# Patient Record
Sex: MALE | Race: WHITE | Employment: PART TIME | URBAN - METROPOLITAN AREA
[De-identification: names, ages, dates, MRNs, and addresses within clinical notes are randomized per-mention and may not be internally consistent; named-entity substitution may affect disease eponyms.]

---

## 2023-08-17 ENCOUNTER — HOSPITAL ENCOUNTER (OUTPATIENT)
Dept: DATA CONVERSION | Facility: HOSPITAL | Age: 68
Discharge: HOME | End: 2023-08-17
Payer: MEDICARE

## 2023-08-17 DIAGNOSIS — Z18.10 RETAINED METAL FRAGMENTS, UNSPECIFIED: ICD-10-CM

## 2023-08-17 DIAGNOSIS — M96.1 POSTLAMINECTOMY SYNDROME, NOT ELSEWHERE CLASSIFIED: ICD-10-CM

## 2023-08-17 DIAGNOSIS — M54.16 RADICULOPATHY, LUMBAR REGION: ICD-10-CM

## 2023-09-03 PROBLEM — M54.9 CHRONIC BACK PAIN: Status: ACTIVE | Noted: 2023-09-03

## 2023-09-03 PROBLEM — G89.29 CHRONIC BACK PAIN: Status: ACTIVE | Noted: 2023-09-03

## 2023-09-03 PROBLEM — I10 ESSENTIAL HYPERTENSION: Status: ACTIVE | Noted: 2023-09-03

## 2023-09-03 PROBLEM — M50.90 CERVICAL DISC DISEASE: Status: ACTIVE | Noted: 2023-09-03

## 2023-09-03 PROBLEM — E78.5 DYSLIPIDEMIA: Status: ACTIVE | Noted: 2023-09-03

## 2023-09-03 PROBLEM — K21.9 GASTROESOPHAGEAL REFLUX DISEASE: Status: ACTIVE | Noted: 2023-09-03

## 2023-09-03 PROBLEM — M15.0 PRIMARY OSTEOARTHRITIS INVOLVING MULTIPLE JOINTS: Status: ACTIVE | Noted: 2023-09-03

## 2023-09-03 PROBLEM — M15.9 PRIMARY OSTEOARTHRITIS INVOLVING MULTIPLE JOINTS: Status: ACTIVE | Noted: 2023-09-03

## 2023-09-03 PROBLEM — E78.2 MIXED HYPERLIPIDEMIA: Status: ACTIVE | Noted: 2023-09-03

## 2023-09-03 PROBLEM — Z96.89 SPINAL CORD STIMULATOR STATUS: Status: ACTIVE | Noted: 2023-09-03

## 2023-09-03 RX ORDER — HYDROGEN PEROXIDE 3 %
1 SOLUTION, NON-ORAL MISCELLANEOUS DAILY
COMMUNITY
End: 2024-01-16 | Stop reason: SDUPTHER

## 2023-09-03 RX ORDER — BUTENAFINE HYDROCHLORIDE 10 MG/G
1 CREAM TOPICAL 2 TIMES DAILY
COMMUNITY
Start: 2022-11-29 | End: 2023-10-09 | Stop reason: WASHOUT

## 2023-09-03 RX ORDER — AMLODIPINE BESYLATE 5 MG/1
1 TABLET ORAL DAILY
COMMUNITY
Start: 2021-02-17 | End: 2023-10-09 | Stop reason: WASHOUT

## 2023-09-03 RX ORDER — ACETAMINOPHEN 500 MG
2 TABLET ORAL EVERY 8 HOURS PRN
COMMUNITY

## 2023-09-03 RX ORDER — DULOXETIN HYDROCHLORIDE 60 MG/1
1 CAPSULE, DELAYED RELEASE ORAL DAILY
COMMUNITY
End: 2024-01-16 | Stop reason: SDUPTHER

## 2023-09-03 RX ORDER — ATORVASTATIN CALCIUM 40 MG/1
1 TABLET, FILM COATED ORAL DAILY
COMMUNITY
Start: 2015-11-13 | End: 2023-11-28

## 2023-09-14 ENCOUNTER — HOSPITAL ENCOUNTER (OUTPATIENT)
Dept: DATA CONVERSION | Facility: HOSPITAL | Age: 68
End: 2023-09-14

## 2023-09-15 ENCOUNTER — HOSPITAL ENCOUNTER (OUTPATIENT)
Dept: DATA CONVERSION | Facility: HOSPITAL | Age: 68
Discharge: HOME | End: 2023-09-15
Payer: MEDICARE

## 2023-10-09 ENCOUNTER — OFFICE VISIT (OUTPATIENT)
Dept: PRIMARY CARE | Facility: CLINIC | Age: 68
End: 2023-10-09
Payer: MEDICARE

## 2023-10-09 VITALS
DIASTOLIC BLOOD PRESSURE: 82 MMHG | SYSTOLIC BLOOD PRESSURE: 126 MMHG | OXYGEN SATURATION: 98 % | HEIGHT: 70 IN | WEIGHT: 210 LBS | HEART RATE: 75 BPM | BODY MASS INDEX: 30.06 KG/M2 | TEMPERATURE: 97.3 F

## 2023-10-09 DIAGNOSIS — M00.9: ICD-10-CM

## 2023-10-09 DIAGNOSIS — L03.319 CELLULITIS AND ABSCESS OF TRUNK: Primary | ICD-10-CM

## 2023-10-09 DIAGNOSIS — L02.219 CELLULITIS AND ABSCESS OF TRUNK: Primary | ICD-10-CM

## 2023-10-09 PROCEDURE — 1125F AMNT PAIN NOTED PAIN PRSNT: CPT | Performed by: NURSE PRACTITIONER

## 2023-10-09 PROCEDURE — 99214 OFFICE O/P EST MOD 30 MIN: CPT | Performed by: NURSE PRACTITIONER

## 2023-10-09 PROCEDURE — 3074F SYST BP LT 130 MM HG: CPT | Performed by: NURSE PRACTITIONER

## 2023-10-09 PROCEDURE — 3079F DIAST BP 80-89 MM HG: CPT | Performed by: NURSE PRACTITIONER

## 2023-10-09 PROCEDURE — 1159F MED LIST DOCD IN RCRD: CPT | Performed by: NURSE PRACTITIONER

## 2023-10-09 PROCEDURE — 4004F PT TOBACCO SCREEN RCVD TLK: CPT | Performed by: NURSE PRACTITIONER

## 2023-10-09 RX ORDER — MULTIVITAMIN
1 TABLET ORAL DAILY
COMMUNITY
End: 2024-01-16 | Stop reason: SDUPTHER

## 2023-10-09 RX ORDER — METHYLPREDNISOLONE 4 MG/1
TABLET ORAL
Qty: 21 TABLET | Refills: 0 | Status: SHIPPED | OUTPATIENT
Start: 2023-10-09 | End: 2023-10-16

## 2023-10-09 RX ORDER — AMOXICILLIN AND CLAVULANATE POTASSIUM 875; 125 MG/1; MG/1
875 TABLET, FILM COATED ORAL 2 TIMES DAILY
Qty: 20 TABLET | Refills: 0 | Status: SHIPPED | OUTPATIENT
Start: 2023-10-09 | End: 2023-10-19

## 2023-10-09 RX ORDER — MULTIVITAMIN/IRON/FOLIC ACID 18MG-0.4MG
1 TABLET ORAL DAILY
COMMUNITY

## 2023-10-09 ASSESSMENT — ENCOUNTER SYMPTOMS
LOSS OF SENSATION IN FEET: 0
DEPRESSION: 0
SHORTNESS OF BREATH: 0
VOMITING: 0
COUGH: 0
FEVER: 0
OCCASIONAL FEELINGS OF UNSTEADINESS: 1
NAUSEA: 0
CHILLS: 0
JOINT SWELLING: 1
FATIGUE: 0

## 2023-10-09 ASSESSMENT — PAIN SCALES - GENERAL: PAINLEVEL: 8

## 2023-10-09 ASSESSMENT — PATIENT HEALTH QUESTIONNAIRE - PHQ9
2. FEELING DOWN, DEPRESSED OR HOPELESS: NOT AT ALL
1. LITTLE INTEREST OR PLEASURE IN DOING THINGS: NOT AT ALL
SUM OF ALL RESPONSES TO PHQ9 QUESTIONS 1 AND 2: 0

## 2023-10-09 NOTE — PATIENT INSTRUCTIONS
Patient instructed to apply heat to abscess on back for 20-30min 4-5x/day. Follow up if symptoms persist or worsen despite intervention.

## 2023-10-09 NOTE — PROGRESS NOTES
[facility]: [facility]  PROGRESS NOTE      Contreras De La Cruz is a 68 y.o. male that is presenting today for Toe Issue (Pt is here due to numbness/discoloration/stinging/burning in rt 2nd toe, for about a month). He states the toe has been discolored for approximately one year but since he had a cortisone inject to coccyx approximately 3 weeks ago - he reports new onset numbness, stinging and burning. Elevation can mildly improve symptoms. The discomfort is intermittent and worsened with ambulation. He denies trauma.    Patient is also c/o abscess to midline lower back. Onset 2 months previous. He reports draining small amount of pus. He has had an I&D abscess to same location in the past. Denies fever, chills, nausea or vomiting.    Assessment/Plan   Diagnoses and all orders for this visit:  Cellulitis and abscess of trunk  -     amoxicillin-pot clavulanate (Augmentin) 875-125 mg tablet; Take 1 tablet (875 mg) by mouth 2 times a day for 10 days.  Inflammation of interphalangeal joint of right toe due to infection (CMS/[facility])  -     amoxicillin-pot clavulanate (Augmentin) 875-125 mg tablet; Take 1 tablet (875 mg) by mouth 2 times a day for 10 days.  -     methylPREDNISolone (Medrol Dospak) 4 mg tablets; Take as directed on package.    Consider differential of gout.    Subjective   HPI  Review of Systems   Constitutional:  Negative for chills, fatigue and fever.   Respiratory:  Negative for cough and shortness of breath.    Cardiovascular:  Negative for chest pain and leg swelling.   Gastrointestinal:  Negative for nausea and vomiting.   Musculoskeletal:  Positive for joint swelling.        Right foot, 2nd toe with painful ROM, mild erythema and tenderness. Mild edema noted at ball of foot   Skin:         Abscess approximately 1.5 x 1.5cm to mid lumbar back at spine, mild erythema, warmth and tenderness, no active drainage or bleeding      Objective   Vitals:    10/09/23 1259   BP: 126/82   Pulse:  "75   Temp: 36.3 °C (97.3 °F)   SpO2: 98%      Body mass index is 30.13 kg/m².  Physical Exam  Diagnostic Results   Lab Results   Component Value Date    GLUCOSE 103 (H) 06/20/2023    CALCIUM 9.7 06/20/2023     06/20/2023    K 4.8 06/20/2023    CO2 27 06/20/2023     06/20/2023    BUN 23 06/20/2023    CREATININE 1.0 06/20/2023     Lab Results   Component Value Date    ALT 26 06/20/2023    AST 20 06/20/2023    ALKPHOS 72 06/20/2023    BILITOT 0.4 06/20/2023     Lab Results   Component Value Date    WBC 8.7 06/20/2023    HGB 11.9 (L) 06/20/2023    HCT 40.5 (L) 06/20/2023    MCV 61.3 (L) 06/20/2023     06/20/2023     Lab Results   Component Value Date    CHOL 169 06/20/2023    CHOL 183 05/19/2022    CHOL 155 11/11/2021     Lab Results   Component Value Date    HDL 42 06/20/2023    HDL 48 05/19/2022    HDL 46 11/11/2021     Lab Results   Component Value Date    LDLCALC 104 06/20/2023    LDLCALC 115 05/19/2022    LDLCALC 93 11/11/2021     Lab Results   Component Value Date    TRIG 116 06/20/2023    TRIG 98 05/19/2022    TRIG 82 11/11/2021     No components found for: \"CHOLHDL\"  Lab Results   Component Value Date    HGBA1C 5.4 06/20/2023     Other labs not included in the list above were reviewed either before or during this encounter.    History    No past medical history on file.  No past surgical history on file.  Family History   Problem Relation Name Age of Onset    Heart failure Mother      Cancer Father       Social History     Socioeconomic History    Marital status:      Spouse name: Not on file    Number of children: Not on file    Years of education: Not on file    Highest education level: Not on file   Occupational History    Not on file   Tobacco Use    Smoking status: Never    Smokeless tobacco: Current     Types: Chew   Substance and Sexual Activity    Alcohol use: Never    Drug use: Yes     Types: Marijuana    Sexual activity: Not on file   Other Topics Concern    Not on file "   Social History Narrative    Not on file     Social Determinants of Health     Financial Resource Strain: Not on file   Food Insecurity: Not on file   Transportation Needs: Not on file   Physical Activity: Not on file   Stress: Not on file   Social Connections: Not on file   Intimate Partner Violence: Not on file   Housing Stability: Not on file     Allergies   Allergen Reactions    Iodinated Contrast Media Other    Gabapentin Hives    Niacin-Lovastatin Hives     Current Outpatient Medications on File Prior to Visit   Medication Sig Dispense Refill    acetaminophen (Tylenol Extra Strength) 500 mg tablet Take 2 tablets (1,000 mg) by mouth every 8 hours if needed. Tylenol 8 Hour 500 mg      atorvastatin (Lipitor) 40 mg tablet Take 1 tablet (40 mg) by mouth once daily. For 90 days      DULoxetine (Cymbalta) 60 mg DR capsule Take 1 capsule (60 mg) by mouth once daily.      esomeprazole (NexIUM) 20 mg DR capsule Take 1 capsule (20 mg) by mouth once daily.      b complex 0.4 mg tablet Take 1 tablet by mouth once daily.      glucosam/chondro/herb 149/hyal (GLUCOS CHOND CPLX ADVANCED ORAL) Take by mouth.      magnesium hydroxide (MAGNESIA ORAL) Take 500 mg by mouth.      multivitamin tablet Take 1 tablet by mouth once daily.      [DISCONTINUED] amLODIPine (Norvasc) 5 mg tablet Take 1 tablet (5 mg) by mouth once daily. For 90 days      [DISCONTINUED] butenafine 1 % cream Apply 1 Application topically 2 times a day. Externally       No current facility-administered medications on file prior to visit.     Immunization History   Administered Date(s) Administered    Flu vaccine, quadrivalent, high-dose, preservative free, age 65y+ (FLUZONE) 11/17/2021, 11/29/2022    Influenza, injectable, quadrivalent 10/22/2018, 09/15/2020    Pfizer COVID-19 vaccine, bivalent, age 12 years and older (30 mcg/0.3 mL) 09/07/2022, 06/30/2023    Pfizer Gray Cap SARS-CoV-2 04/06/2022    Pfizer Purple Cap SARS-CoV-2 03/17/2021, 04/28/2021, 11/22/2021     Pneumococcal conjugate vaccine, 20-valent (PREVNAR 20) 06/13/2023    Tdap vaccine, age 7 year and older (BOOSTRIX) 11/11/2015, 05/16/2016    Zoster vaccine, recombinant, adult (SHINGRIX) 06/29/2023, 08/27/2023    Zoster, live 05/16/2016     Patient's medical history was reviewed and updated either before or during this encounter.    Lakeisha Plata, APRN-CNP

## 2023-10-17 ENCOUNTER — OFFICE VISIT (OUTPATIENT)
Dept: PAIN MEDICINE | Facility: CLINIC | Age: 68
End: 2023-10-17
Payer: MEDICARE

## 2023-10-17 VITALS
BODY MASS INDEX: 30.06 KG/M2 | WEIGHT: 210 LBS | HEART RATE: 83 BPM | DIASTOLIC BLOOD PRESSURE: 88 MMHG | SYSTOLIC BLOOD PRESSURE: 132 MMHG | HEIGHT: 70 IN

## 2023-10-17 DIAGNOSIS — M54.16 LUMBAR RADICULOPATHY: ICD-10-CM

## 2023-10-17 DIAGNOSIS — M54.51 VERTEBROGENIC LOW BACK PAIN: Primary | ICD-10-CM

## 2023-10-17 PROCEDURE — 99213 OFFICE O/P EST LOW 20 MIN: CPT | Performed by: STUDENT IN AN ORGANIZED HEALTH CARE EDUCATION/TRAINING PROGRAM

## 2023-10-17 PROCEDURE — 1160F RVW MEDS BY RX/DR IN RCRD: CPT | Performed by: STUDENT IN AN ORGANIZED HEALTH CARE EDUCATION/TRAINING PROGRAM

## 2023-10-17 PROCEDURE — 3075F SYST BP GE 130 - 139MM HG: CPT | Performed by: STUDENT IN AN ORGANIZED HEALTH CARE EDUCATION/TRAINING PROGRAM

## 2023-10-17 PROCEDURE — 1159F MED LIST DOCD IN RCRD: CPT | Performed by: STUDENT IN AN ORGANIZED HEALTH CARE EDUCATION/TRAINING PROGRAM

## 2023-10-17 PROCEDURE — 3079F DIAST BP 80-89 MM HG: CPT | Performed by: STUDENT IN AN ORGANIZED HEALTH CARE EDUCATION/TRAINING PROGRAM

## 2023-10-17 PROCEDURE — 1125F AMNT PAIN NOTED PAIN PRSNT: CPT | Performed by: STUDENT IN AN ORGANIZED HEALTH CARE EDUCATION/TRAINING PROGRAM

## 2023-10-17 PROCEDURE — 4004F PT TOBACCO SCREEN RCVD TLK: CPT | Performed by: STUDENT IN AN ORGANIZED HEALTH CARE EDUCATION/TRAINING PROGRAM

## 2023-10-17 ASSESSMENT — PATIENT HEALTH QUESTIONNAIRE - PHQ9
SUM OF ALL RESPONSES TO PHQ9 QUESTIONS 1 AND 2: 0
2. FEELING DOWN, DEPRESSED OR HOPELESS: NOT AT ALL
1. LITTLE INTEREST OR PLEASURE IN DOING THINGS: NOT AT ALL

## 2023-10-17 ASSESSMENT — PAIN SCALES - GENERAL: PAINLEVEL: 8

## 2023-10-17 NOTE — PROGRESS NOTES
[facility]  Follow Up Office Visit Note 10/17/2023    Patient Information: Contreras De La Cruz, MRN: [id number], : 1955   Primary Care/Referring Physician: Lakeisha Plata, APRN-CNP, [address] [place] [address] / [place] [address]     Chief Complaint: Low back and right leg pain  Interval History: He returns for follow up after caudal LIZZETTE. We also submitted for Intracept    Today he reports 80% pain relief of his right thigh and groin pain. However, he continues to have significant midline axial low back pain    Brief History of Pain: Mr. Contreras De La Cruz is a 68 y.o. male with a PMHx of  HTN, HLD, GERD, tobacco use disorder who presents for evaluation of mid/low back, right leg, and tailbone pain.    For reference, he reports pain ongoing for many years, with recent exacerbation of low back and right leg pain. There was no obvious inciting event that caused this worsening pain. This pain worsens primarily with bending forward and lifting. He previously had an SCS in place for 10-15 years but had this removed in 2022 because he did not feel it was providing pain relief any longer. He was previously followed by Dr. Mcneal at [facility] for some time who was doing injections with benefit. Most recently he was followed by Dr. Herrera, who performed lumbar medial branch blocks with benefit, but the patient did not want to continue receiving injections since no sedation was given    Current Pain Medications: Ibuprofen 200-400 mg daily, uses THC gummies  Previously Tried Pain Medications: Tylenol - no benefit. He reports trying a number of medications for pain in the past but is not interested in restarting any of them    Relevant Surgeries: Hx of lumbar spine surgery x2 (last in ) as well as cervical spine surgery ()  Injections: Caudal LIZZETTE - 80% pain relief. Knee steroid injections with minimal relief. Lumbar mbb's with only 30% relief. Reportedly had multiple other injections with Dr. Mcneal at  [facility]  Physical/Occupational Therapy: Has done PT in the past but not recently    Medications:   Current Outpatient Medications   Medication Instructions    acetaminophen (Tylenol Extra Strength) 500 mg tablet 2 tablets, oral, Every 8 hours PRN, Tylenol 8 Hour 500 mg<BR>    amoxicillin-pot clavulanate (Augmentin) 875-125 mg tablet 875 mg, oral, 2 times daily    atorvastatin (Lipitor) 40 mg tablet 1 tablet, oral, Daily, For 90 days     b complex 0.4 mg tablet 1 tablet, oral, Daily    DULoxetine (Cymbalta) 60 mg DR capsule 1 capsule, oral, Daily    esomeprazole (NexIUM) 20 mg DR capsule 1 capsule, oral, Daily    glucosam/chondro/herb 149/hyal (GLUCOS CHOND CPLX ADVANCED ORAL) oral    magnesium hydroxide (MAGNESIA ORAL) 500 mg, oral    multivitamin tablet 1 tablet, oral, Daily      Allergies:   Allergies   Allergen Reactions    Iodinated Contrast Media Other    Gabapentin Hives    Niacin-Lovastatin Hives       Past Medical & Surgical History:  History reviewed. No pertinent past medical history. History reviewed. No pertinent surgical history.    Family History   Problem Relation Name Age of Onset    Heart failure Mother      Cancer Father       Social History     Socioeconomic History    Marital status:      Spouse name: Not on file    Number of children: Not on file    Years of education: Not on file    Highest education level: Not on file   Occupational History    Not on file   Tobacco Use    Smoking status: Never    Smokeless tobacco: Current     Types: Chew   Substance and Sexual Activity    Alcohol use: Never    Drug use: Yes     Types: Marijuana    Sexual activity: Not on file   Other Topics Concern    Not on file   Social History Narrative    Not on file     Social Determinants of Health     Financial Resource Strain: Not on file   Food Insecurity: Not on file   Transportation Needs: Not on file   Physical Activity: Not on file   Stress: Not on file   Social Connections: Not on file   Intimate Partner  "Violence: Not on file   Housing Stability: Not on file       Problems, Past medical history, past surgical history, Medications, allergies, social and family history reviewed and as per the electronic medical record from today's encounter    Review of Systems:  CONST: No fever, chills, fatigue, weight changes  EYES: No loss of vision  ENT: No hearing loss, tinnitus  CV: No chest pain, palpitations  RESP: No dyspnea, shortness of breath, cough  GI: No stool incontinence, nausea, vomiting  : No urinary incontinence  MSK: No joint swelling  SKIN: No rash, no hives  NEURO: No headache, dizziness, weakness, paresthesias  PSYCH: No anxiety, depression or suicidal ideation  HEM/LYMPH: No easy bruising or bleeding  All other systems reviewed are negative     Physical Exam:  Vitals: /88   Pulse 83   Ht 1.778 m (5' 10\")   Wt 95.3 kg (210 lb)   BMI 30.13 kg/m²   General: No apparent distress. Alert, appropriate, oriented x 3. Mood generally positive, affect congruent. Speaking in full sentences.   HENT: Normocephalic, atraumatic. Hearing intact.  Eyes: Pupils equal and round  Neck: Supple, trachea midline  Lungs: Symmetric respiratory excursion on visual exam, nonlabored breathing.   Extremities: No cyanosis or edema noted in extremities.  Skin: No rashes, lesions noted.  Neuro: Alert and appropriate. Gait within normal limits. Bulk and tone within normal limits.    Laboratory Data:  The following laboratory data were reviewed during this visit:   Lab Results   Component Value Date    WBC 8.7 06/20/2023    RBC 6.61 (H) 06/20/2023    HGB 11.9 (L) 06/20/2023    HCT 40.5 (L) 06/20/2023     06/20/2023      No results found for: \"INR\"  Lab Results   Component Value Date    CREATININE 1.0 06/20/2023    HGBA1C 5.4 06/20/2023       Imaging:  The following imaging impressions were reviewed by me during this visit:    - 8/17/23 lumbar spine MRI shows L4/5 moderate posterior disc osteophyte complex, postsurgical " changes about the right rosalia lamina, moderate right foraminal narrowing. L5/S1 asymmetric left paracentral and foraminal disc bulge likely abutting the descedning left S1 nerve root, postsurgical changes status post left hemilaminectomy. Post gadolinium imaging demonstrates mild left lateral and posterolateral epidural enhancement. No nerve root enhancement demonstrated. Modic changes noted at L5 and S1, and more subtly at L4.  -5/1/23 bilateral knee xray shows moderate OA  -8/11/23 thoracic spine CT shows T10-T11 right foraminal disc protrusion causing moderate right and no left foraminal stenosis or significant canal stenosis.    I also personally reviewed the images from the above studies myself. These images and my interpretation of them contributed to the management and decision making of the patient's medical plan.    ASSESSMENT:  Mr. Contreras De La Cruz is a 68 y.o. male with low back and right leg pain that is consistent with:    1. Vertebrogenic low back pain    2. Lumbar radiculopathy        PLAN:  Radiology: I reviewed his 8/17/23 lumbar spine MRI which shows L4/5 moderate posterior disc osteophyte complex, postsurgical changes about the right rosalia lamina, moderate right foraminal narrowing. L5/S1 asymmetric left paracentral and foraminal disc bulge likely abutting the descedning left S1 nerve root, postsurgical changes status post left hemilaminectomy. Post gadolinium imaging demonstrates mild left lateral and posterolateral epidural enhancement. No nerve root enhancement demonstrated. Modic II changes noted at L5 and S1, and more subtly at L4.    Physically: Has done PT in the past without durable pain relief. Should maintain a regular HEP    Psychologically: No needs at this time    Medication: No changes to his medications today. He reports undergoing numerous medication trials in the past and is not interested in adding any new medications    Duration: Multiple years    Intervention: I suspect his pain is  secondary to lumbar radiculopathy and vertebrogenic low back pain. He has a fairly complex history of multiple back surgeries, multiple injections for pain, as well as SCS. After review of his recent lumbar spine MRI I suspect much of his axial low back pain is secondary to Modic II changes noted at L4, L5, and S1. Given everything else he has tried we did discuss the Intracept procedure at these levels. We are awaiting approval  - He continues to have right leg pain that is likely radicular in nature. I performed a caudal LIZZETTE with 80% improvement in his right leg pain. Will monitor for duration of pain relief and consider repeating in the future        Sincerely,  Bartolome Huber MD  [facility] - [place]

## 2023-11-20 ENCOUNTER — HOSPITAL ENCOUNTER (OUTPATIENT)
Facility: HOSPITAL | Age: 68
Setting detail: OUTPATIENT SURGERY
Discharge: HOME | End: 2023-11-20
Attending: ANESTHESIOLOGY | Admitting: ANESTHESIOLOGY
Payer: MEDICARE

## 2023-11-20 ENCOUNTER — ANESTHESIA (OUTPATIENT)
Dept: OPERATING ROOM | Facility: HOSPITAL | Age: 68
End: 2023-11-20
Payer: MEDICARE

## 2023-11-20 ENCOUNTER — APPOINTMENT (OUTPATIENT)
Dept: RADIOLOGY | Facility: HOSPITAL | Age: 68
End: 2023-11-20
Payer: MEDICARE

## 2023-11-20 ENCOUNTER — ANESTHESIA EVENT (OUTPATIENT)
Dept: OPERATING ROOM | Facility: HOSPITAL | Age: 68
End: 2023-11-20
Payer: MEDICARE

## 2023-11-20 VITALS
HEART RATE: 59 BPM | SYSTOLIC BLOOD PRESSURE: 144 MMHG | DIASTOLIC BLOOD PRESSURE: 76 MMHG | TEMPERATURE: 97.7 F | RESPIRATION RATE: 16 BRPM | OXYGEN SATURATION: 100 %

## 2023-11-20 PROCEDURE — 2500000004 HC RX 250 GENERAL PHARMACY W/ HCPCS (ALT 636 FOR OP/ED): Performed by: STUDENT IN AN ORGANIZED HEALTH CARE EDUCATION/TRAINING PROGRAM

## 2023-11-20 PROCEDURE — 3700000002 HC GENERAL ANESTHESIA TIME - EACH INCREMENTAL 1 MINUTE: Performed by: STUDENT IN AN ORGANIZED HEALTH CARE EDUCATION/TRAINING PROGRAM

## 2023-11-20 PROCEDURE — 2500000004 HC RX 250 GENERAL PHARMACY W/ HCPCS (ALT 636 FOR OP/ED): Performed by: ANESTHESIOLOGIST ASSISTANT

## 2023-11-20 PROCEDURE — 7100000001 HC RECOVERY ROOM TIME - INITIAL BASE CHARGE: Performed by: STUDENT IN AN ORGANIZED HEALTH CARE EDUCATION/TRAINING PROGRAM

## 2023-11-20 PROCEDURE — 3600000006 HC OR TIME - EACH INCREMENTAL 1 MINUTE - PROCEDURE LEVEL ONE: Performed by: STUDENT IN AN ORGANIZED HEALTH CARE EDUCATION/TRAINING PROGRAM

## 2023-11-20 PROCEDURE — 2720000007 HC OR 272 NO HCPCS: Performed by: STUDENT IN AN ORGANIZED HEALTH CARE EDUCATION/TRAINING PROGRAM

## 2023-11-20 PROCEDURE — 76000 FLUOROSCOPY <1 HR PHYS/QHP: CPT

## 2023-11-20 PROCEDURE — C1889 IMPLANT/INSERT DEVICE, NOC: HCPCS | Performed by: STUDENT IN AN ORGANIZED HEALTH CARE EDUCATION/TRAINING PROGRAM

## 2023-11-20 PROCEDURE — A64628 PR THERMAL DSTRJ INTRAOSSEOUS BVN 1ST 2 LMBR/SAC: Performed by: ANESTHESIOLOGY

## 2023-11-20 PROCEDURE — 3600000001 HC OR TIME - INITIAL BASE CHARGE - PROCEDURE LEVEL ONE: Performed by: STUDENT IN AN ORGANIZED HEALTH CARE EDUCATION/TRAINING PROGRAM

## 2023-11-20 PROCEDURE — 64628 TRML DSTRJ IOS BVN 1ST 2 L/S: CPT | Performed by: STUDENT IN AN ORGANIZED HEALTH CARE EDUCATION/TRAINING PROGRAM

## 2023-11-20 PROCEDURE — 3700000001 HC GENERAL ANESTHESIA TIME - INITIAL BASE CHARGE: Performed by: STUDENT IN AN ORGANIZED HEALTH CARE EDUCATION/TRAINING PROGRAM

## 2023-11-20 PROCEDURE — 7100000010 HC PHASE TWO TIME - EACH INCREMENTAL 1 MINUTE: Performed by: STUDENT IN AN ORGANIZED HEALTH CARE EDUCATION/TRAINING PROGRAM

## 2023-11-20 PROCEDURE — A64628 PR THERMAL DSTRJ INTRAOSSEOUS BVN 1ST 2 LMBR/SAC: Performed by: ANESTHESIOLOGIST ASSISTANT

## 2023-11-20 PROCEDURE — 7100000009 HC PHASE TWO TIME - INITIAL BASE CHARGE: Performed by: STUDENT IN AN ORGANIZED HEALTH CARE EDUCATION/TRAINING PROGRAM

## 2023-11-20 PROCEDURE — 2500000005 HC RX 250 GENERAL PHARMACY W/O HCPCS: Performed by: STUDENT IN AN ORGANIZED HEALTH CARE EDUCATION/TRAINING PROGRAM

## 2023-11-20 PROCEDURE — 64629 TRML DSTRJ IOS BVN EA ADDL: CPT | Performed by: STUDENT IN AN ORGANIZED HEALTH CARE EDUCATION/TRAINING PROGRAM

## 2023-11-20 PROCEDURE — 2500000004 HC RX 250 GENERAL PHARMACY W/ HCPCS (ALT 636 FOR OP/ED): Performed by: ANESTHESIOLOGY

## 2023-11-20 PROCEDURE — 7100000002 HC RECOVERY ROOM TIME - EACH INCREMENTAL 1 MINUTE: Performed by: STUDENT IN AN ORGANIZED HEALTH CARE EDUCATION/TRAINING PROGRAM

## 2023-11-20 RX ORDER — ALBUTEROL SULFATE 0.83 MG/ML
2.5 SOLUTION RESPIRATORY (INHALATION) ONCE AS NEEDED
Status: DISCONTINUED | OUTPATIENT
Start: 2023-11-20 | End: 2023-11-20 | Stop reason: HOSPADM

## 2023-11-20 RX ORDER — HYDRALAZINE HYDROCHLORIDE 20 MG/ML
5 INJECTION INTRAMUSCULAR; INTRAVENOUS EVERY 30 MIN PRN
Status: DISCONTINUED | OUTPATIENT
Start: 2023-11-20 | End: 2023-11-20 | Stop reason: HOSPADM

## 2023-11-20 RX ORDER — SODIUM CHLORIDE, SODIUM LACTATE, POTASSIUM CHLORIDE, CALCIUM CHLORIDE 600; 310; 30; 20 MG/100ML; MG/100ML; MG/100ML; MG/100ML
100 INJECTION, SOLUTION INTRAVENOUS CONTINUOUS
Status: DISCONTINUED | OUTPATIENT
Start: 2023-11-20 | End: 2023-11-20 | Stop reason: HOSPADM

## 2023-11-20 RX ORDER — FENTANYL CITRATE 50 UG/ML
INJECTION, SOLUTION INTRAMUSCULAR; INTRAVENOUS AS NEEDED
Status: DISCONTINUED | OUTPATIENT
Start: 2023-11-20 | End: 2023-11-20

## 2023-11-20 RX ORDER — MIDAZOLAM HYDROCHLORIDE 1 MG/ML
1 INJECTION, SOLUTION INTRAMUSCULAR; INTRAVENOUS ONCE AS NEEDED
Status: DISCONTINUED | OUTPATIENT
Start: 2023-11-20 | End: 2023-11-20 | Stop reason: HOSPADM

## 2023-11-20 RX ORDER — NALOXONE HYDROCHLORIDE 0.4 MG/ML
0.2 INJECTION, SOLUTION INTRAMUSCULAR; INTRAVENOUS; SUBCUTANEOUS EVERY 5 MIN PRN
Status: CANCELLED | OUTPATIENT
Start: 2023-11-20

## 2023-11-20 RX ORDER — OXYCODONE HYDROCHLORIDE 5 MG/1
5 TABLET ORAL EVERY 6 HOURS PRN
Status: CANCELLED | OUTPATIENT
Start: 2023-11-20

## 2023-11-20 RX ORDER — MIDAZOLAM HYDROCHLORIDE 1 MG/ML
INJECTION, SOLUTION INTRAMUSCULAR; INTRAVENOUS AS NEEDED
Status: DISCONTINUED | OUTPATIENT
Start: 2023-11-20 | End: 2023-11-20

## 2023-11-20 RX ORDER — FENTANYL CITRATE 50 UG/ML
50 INJECTION, SOLUTION INTRAMUSCULAR; INTRAVENOUS EVERY 5 MIN PRN
Status: DISCONTINUED | OUTPATIENT
Start: 2023-11-20 | End: 2023-11-20 | Stop reason: HOSPADM

## 2023-11-20 RX ORDER — ACETAMINOPHEN 325 MG/1
650 TABLET ORAL EVERY 4 HOURS PRN
Status: CANCELLED | OUTPATIENT
Start: 2023-11-20

## 2023-11-20 RX ORDER — BUPIVACAINE HYDROCHLORIDE 5 MG/ML
INJECTION, SOLUTION PERINEURAL AS NEEDED
Status: DISCONTINUED | OUTPATIENT
Start: 2023-11-20 | End: 2023-11-20 | Stop reason: HOSPADM

## 2023-11-20 RX ORDER — LIDOCAINE HYDROCHLORIDE 10 MG/ML
0.1 INJECTION INFILTRATION; PERINEURAL ONCE
Status: DISCONTINUED | OUTPATIENT
Start: 2023-11-20 | End: 2023-11-20 | Stop reason: HOSPADM

## 2023-11-20 RX ORDER — ONDANSETRON HYDROCHLORIDE 2 MG/ML
4 INJECTION, SOLUTION INTRAVENOUS ONCE AS NEEDED
Status: DISCONTINUED | OUTPATIENT
Start: 2023-11-20 | End: 2023-11-20 | Stop reason: HOSPADM

## 2023-11-20 RX ORDER — CEFAZOLIN SODIUM 2 G/100ML
2 INJECTION, SOLUTION INTRAVENOUS ONCE
Status: COMPLETED | OUTPATIENT
Start: 2023-11-20 | End: 2023-11-20

## 2023-11-20 RX ORDER — LIDOCAINE HYDROCHLORIDE 10 MG/ML
INJECTION, SOLUTION INTRAVENOUS AS NEEDED
Status: DISCONTINUED | OUTPATIENT
Start: 2023-11-20 | End: 2023-11-20

## 2023-11-20 RX ORDER — PROPOFOL 10 MG/ML
INJECTION, EMULSION INTRAVENOUS AS NEEDED
Status: DISCONTINUED | OUTPATIENT
Start: 2023-11-20 | End: 2023-11-20

## 2023-11-20 RX ADMIN — MIDAZOLAM HYDROCHLORIDE 2 MG: 1 INJECTION, SOLUTION INTRAMUSCULAR; INTRAVENOUS at 14:40

## 2023-11-20 RX ADMIN — FENTANYL CITRATE 50 MCG: 50 INJECTION, SOLUTION INTRAMUSCULAR; INTRAVENOUS at 16:43

## 2023-11-20 RX ADMIN — CEFAZOLIN SODIUM 2 G: 2 INJECTION, SOLUTION INTRAVENOUS at 14:40

## 2023-11-20 RX ADMIN — FENTANYL CITRATE 25 MCG: 0.05 INJECTION, SOLUTION INTRAMUSCULAR; INTRAVENOUS at 16:24

## 2023-11-20 RX ADMIN — SODIUM CHLORIDE, SODIUM LACTATE, POTASSIUM CHLORIDE, AND CALCIUM CHLORIDE: 600; 310; 30; 20 INJECTION, SOLUTION INTRAVENOUS at 14:40

## 2023-11-20 RX ADMIN — PROPOFOL 160 MG: 10 INJECTION, EMULSION INTRAVENOUS at 14:47

## 2023-11-20 RX ADMIN — PROPOFOL 200 MG: 10 INJECTION, EMULSION INTRAVENOUS at 15:25

## 2023-11-20 RX ADMIN — PROPOFOL 200 MG: 10 INJECTION, EMULSION INTRAVENOUS at 14:58

## 2023-11-20 RX ADMIN — FENTANYL CITRATE 25 MCG: 0.05 INJECTION, SOLUTION INTRAMUSCULAR; INTRAVENOUS at 16:02

## 2023-11-20 RX ADMIN — PROPOFOL 40 MG: 10 INJECTION, EMULSION INTRAVENOUS at 14:45

## 2023-11-20 RX ADMIN — FENTANYL CITRATE 50 MCG: 0.05 INJECTION, SOLUTION INTRAMUSCULAR; INTRAVENOUS at 14:45

## 2023-11-20 RX ADMIN — LIDOCAINE HYDROCHLORIDE 30 MG: 10 INJECTION, SOLUTION INTRAVENOUS at 14:45

## 2023-11-20 RX ADMIN — PROPOFOL 30 MG: 10 INJECTION, EMULSION INTRAVENOUS at 16:19

## 2023-11-20 RX ADMIN — HYDROMORPHONE HYDROCHLORIDE 0.4 MG: 1 INJECTION, SOLUTION INTRAMUSCULAR; INTRAVENOUS; SUBCUTANEOUS at 17:05

## 2023-11-20 RX ADMIN — FENTANYL CITRATE 50 MCG: 50 INJECTION, SOLUTION INTRAMUSCULAR; INTRAVENOUS at 16:38

## 2023-11-20 RX ADMIN — PROPOFOL 170 MG: 10 INJECTION, EMULSION INTRAVENOUS at 15:54

## 2023-11-20 RX ADMIN — HYDROMORPHONE HYDROCHLORIDE 0.4 MG: 1 INJECTION, SOLUTION INTRAMUSCULAR; INTRAVENOUS; SUBCUTANEOUS at 16:55

## 2023-11-20 SDOH — HEALTH STABILITY: MENTAL HEALTH: CURRENT SMOKER: 0

## 2023-11-20 ASSESSMENT — ENCOUNTER SYMPTOMS
NUMBNESS: 0
COLOR CHANGE: 0
CHILLS: 0
RHINORRHEA: 0
UNEXPECTED WEIGHT CHANGE: 0
WEAKNESS: 0
SHORTNESS OF BREATH: 0
FEVER: 0
DYSPHORIC MOOD: 0
HEADACHES: 0
BRUISES/BLEEDS EASILY: 0
EYE DISCHARGE: 0
NERVOUS/ANXIOUS: 0
SORE THROAT: 0
LIGHT-HEADEDNESS: 0
COUGH: 0
DIZZINESS: 0
MYALGIAS: 0
PALPITATIONS: 0

## 2023-11-20 ASSESSMENT — PAIN SCALES - GENERAL
PAINLEVEL_OUTOF10: 7
PAINLEVEL_OUTOF10: 5 - MODERATE PAIN
PAINLEVEL_OUTOF10: 0 - NO PAIN
PAINLEVEL_OUTOF10: 6
PAINLEVEL_OUTOF10: 6
PAINLEVEL_OUTOF10: 0 - NO PAIN
PAINLEVEL_OUTOF10: 0 - NO PAIN
PAINLEVEL_OUTOF10: 6
PAINLEVEL_OUTOF10: 7
PAIN_LEVEL: 2
PAINLEVEL_OUTOF10: 0 - NO PAIN

## 2023-11-20 ASSESSMENT — PAIN - FUNCTIONAL ASSESSMENT
PAIN_FUNCTIONAL_ASSESSMENT: 0-10

## 2023-11-20 ASSESSMENT — COLUMBIA-SUICIDE SEVERITY RATING SCALE - C-SSRS
2. HAVE YOU ACTUALLY HAD ANY THOUGHTS OF KILLING YOURSELF?: NO
1. IN THE PAST MONTH, HAVE YOU WISHED YOU WERE DEAD OR WISHED YOU COULD GO TO SLEEP AND NOT WAKE UP?: NO
6. HAVE YOU EVER DONE ANYTHING, STARTED TO DO ANYTHING, OR PREPARED TO DO ANYTHING TO END YOUR LIFE?: NO

## 2023-11-20 ASSESSMENT — ACTIVITIES OF DAILY LIVING (ADL): EFFECT OF PAIN ON DAILY ACTIVITIES: LIMITED ROM

## 2023-11-20 ASSESSMENT — PAIN DESCRIPTION - DESCRIPTORS: DESCRIPTORS: ACHING;THROBBING

## 2023-11-20 NOTE — ANESTHESIA PREPROCEDURE EVALUATION
Patient: Contreras De La Cruz    Procedure Information       Date/Time: 11/20/23 1430    Procedure: INTRACEPT - C-ARM, INTRACEPT    Location: [facility] 01 / [facility]    Surgeons: Bartolome Huber MD            Relevant Problems   Cardiovascular   (+) Essential hypertension   (+) Mixed hyperlipidemia      GI   (+) Gastroesophageal reflux disease      Musculoskeletal   (+) Primary osteoarthritis involving multiple joints       Clinical information reviewed:   Tobacco  Allergies  Meds   Med Hx  Surg Hx   Fam Hx  Soc Hx        NPO Detail:  NPO/Void Status  Carbonhydrate Drink Given Prior to Surgery? : N  Date of Last Liquid: 11/20/23  Time of Last Liquid: 0900  Date of Last Solid: 11/19/23  Time of Last Solid: 2100  Last Intake Type: Clear fluids  Time of Last Void: 1330         Physical Exam    Airway  Mallampati: II  TM distance: >3 FB  Neck ROM: full     Cardiovascular - normal exam     Dental    Pulmonary - normal exam     Abdominal - normal exam             Anesthesia Plan    ASA 2     general     The patient is not a current smoker.  Patient was not previously instructed to abstain from smoking on day of procedure.  Patient did not smoke on day of procedure.  Education provided regarding risk of obstructive sleep apnea.  intravenous induction   Postoperative administration of opioids is intended.  Trial extubation is planned.  Anesthetic plan and risks discussed with patient.  Use of blood products discussed with patient who consented to blood products.    Plan discussed with CAA, attending and CRNA.

## 2023-11-20 NOTE — ANESTHESIA POSTPROCEDURE EVALUATION
Patient: Contreras De La Cruz    Procedure Summary       Date: 11/20/23 Room / Location: [facility] / [facility]    Anesthesia Start: 1440 Anesthesia Stop: 1630    Procedure: INTRACEPT (Back) Diagnosis:       Vertebrogenic low back pain      (M54.51)    Surgeons: Bartolome Huber MD Responsible Provider: Ramsey Santizo MD    Anesthesia Type: general ASA Status: 2            Anesthesia Type: general    Vitals Value Taken Time   /76 11/20/23 1750   Temp 36.5 °C (97.7 °F) 11/20/23 1750   Pulse 59 11/20/23 1750   Resp 16 11/20/23 1750   SpO2 100 % 11/20/23 1750       Anesthesia Post Evaluation    Patient location during evaluation: PACU  Patient participation: complete - patient participated  Level of consciousness: sleepy but conscious  Pain score: 2  Pain management: adequate  Multimodal analgesia pain management approach  Airway patency: patent  Cardiovascular status: acceptable  Respiratory status: acceptable  Hydration status: acceptable  Postoperative Nausea and Vomiting: none        No notable events documented.

## 2023-11-20 NOTE — OP NOTE
INTRACEPT Operative Note     Date: 2023  OR Location: [facility]    Name: Contreras De La Cruz, : 1955, Age: 68 y.o., MRN: [id number], Sex: male    Diagnosis  Pre-op Diagnosis     * Vertebrogenic low back pain [M54.51] Post-op Diagnosis     * Vertebrogenic low back pain [M54.51]     Procedures  INTRACEPT  74311 - [place] THERMAL DSTRJ INTRAOSSEOUS BVN 1ST 2 LMBR/SAC    INTRACEPT  70230 - [place] THERMAL DSTRJ INTRAOSSEOUS BVN EA ADDL LMBR/SAC      Surgeons      * Bartolome Huber - Primary    Resident/Fellow/Other Assistant:  Surgeon(s) and Role:    Procedure Summary  Anesthesia: Consult  ASA: II  Anesthesia Staff: Anesthesiologist: Ramsey Santizo MD  C-AA: GM [place]  Estimated Blood Loss: 5 mL  Intra-op Medications: * No intraprocedure medications in log *           Anesthesia Record               Intraprocedure I/O Totals       None           Specimen: No specimens collected     Staff:   Circulator: Socorro Bailey RN  Relief Circulator: Nick Davis RN  Scrub Person: Glory Kelly RN         Drains and/or Catheters: * None in log *    Implants: None    Findings: No abnormal findings    Indications: Contreras De La Cruz is an 68 y.o. male who is having surgery for M54.51.     The patient was seen in the preoperative area. The risks, benefits, complications, treatment options, non-operative alternatives, expected recovery and outcomes were discussed with the patient. The possibilities of reaction to medication, pulmonary aspiration, injury to surrounding structures, bleeding, recurrent infection, the need for additional procedures, failure to diagnose a condition, and creating a complication requiring transfusion or operation were discussed with the patient. The patient concurred with the proposed plan, giving informed consent.  The site of surgery was properly noted/marked if necessary per policy. The patient has been actively warmed in preoperative area. Preoperative antibiotics have been ordered  and given within 1 hours of incision. Venous thrombosis prophylaxis have been ordered including bilateral sequential compression devices    Procedure Details:     Intracept Procedure at L4, L5, S1 Vertebral Levels  A time out was performed to confirm the correct site, laterality, and procedure. Patient was placed in the prone position with a pillow under the abdomen to reduce lumbar lordosis, and the surgical site was marked. A BP cuff, EKG, and oxygen saturation monitors were attached and the patient was monitored throughout the procedure. Preoperative antibiotics were administered at least 15 minutes prior to skin incision. The skin was prepped with Chloroprep, allowed to dry for 3 minutes, and draped in a sterile fashion    Next, AP and oblique fluoroscopy was used to identify the pedicles of the L4, L5, and S1 vertebrae. The skin overlying the superolateral portion of the target pedicles were marked and anesthetized with 5 mL of bupivacaine 0.5%. A 22g 3.5 inch spinal needle was advanced under fluoroscopic guidance to the superolateral portion of the right L4 pedicle until bony contact made and 5 mL bupivacaine 0.5% was then injected. An 11 blade scalpel was used to make a 5 mm horizontal incision a few millimeters superolateral to the needle entry sites. Next, an 8 gauge jack tip introducer cannula was guided to the superolateral pedicle until bony contact made. A mallet was used to tap the introducer into the pedicle, at which point the jack tip was removed and the bevel tip introducer placed. AP and lateral fluoroscopy was used to carefully guide the introducer through the pedicle until the tip just entered the posterior vertebral body.  The bevel tip was removed and the curved cannula assembly placed through the introducer. This was slowly tapped into place so that the tip was approximately 50% the depth, width, and height of the vertebra. The J-stylet was then removed and the ablation probe was  placed, again confirming the tip of the probe was at the target location. Ablation was then performed at 85 degrees for 7 minutes     This was repeated in a similar fashion at the L5 level, utilizing the left pedicle for vertebral body access and ablating at 85 degrees for 7 minutes    For the S1 vertebral level, the fluoroscopy was tilted cephalad to visualize the L5/S1 disc and the pedicles. The skin overlying the right iliac crest was marked and anesthetized with 5 mL of bupivacaine 0.5%. A 22g 3.5 inch spinal needle was advanced under fluoroscopic guidance to the superolateral portion of the right S1 pedicle until bony contact made and 5 mL bupivacaine 0.5% was then injected. An 11 blade scalpel was used to make a 5 mm horizontal incision at this site. A similar procedure to above was performed with the target being 40% inferior to the superior endplate of S1, 50% of the width, and 50% of the depth. Once the probe was in place, ablation occurred at 85 degrees for 15 minutes    All instruments were removed. The skin was wicked with a towel, the skin cleaned, and the small incision closed with dermabond, a 2x2 gauze, and tegaderm.       Complications:  None; patient tolerated the procedure well.    Disposition: PACU - hemodynamically stable.  Condition: stable         Additional Details: NA    Attending Attestation: I performed the procedure.    Bartolome Huber  Phone Number: [phone]

## 2023-11-20 NOTE — DISCHARGE INSTRUCTIONS
DISCHARGE INSTRUCTIONS FOR INJECTIONS     You underwent the Intracept procedure at L4, L5, S1 today    Aftermost injections, it is recommended that you relax and limit your activity for the remainder of the day unless you have been told otherwise by your pain physician.  You should not drive a car, operate machinery, or make important legal decisions unless otherwise directed by your pain physician.  You may resume your normal activity, including exercise, tomorrow.      Keep a written pain diary of how much pain relief you experienced following the injection procedure and the length of time of pain relief you experienced pain relief. Following diagnostic injections like medial branch nerve blocks, sacroiliac joint blocks, stellate ganglion injections and other blocks, it is very important you record the specific amount of pain relief you experienced immediately after the injectionand how long it lasted. Your doctor will ask you for this information at your follow up visit.     For all injections, please keep the injection site dry and inspect the site for a couple of days. You may remove the Band-Aid the day of the injection at any time.     Some discomfort, bruising or slight swelling may occur at the injection site. This is not abnormal if it occurs.  If needed you may:    -Take over the counter medication such as Tylenol or Motrin.   -Apply an ice pack for 30 minutes, 2 to 3 times a day for the first 24 hours.     You may shower in 2 days; no soaking baths, hot tubs, whirlpools or swimming pools for 1 week.      If you are given steroids in your injection, it may take 3-5 days for the steroid medication to take effect. You may notice a worsening of your symptoms for 1-2 days after the injection. This is not abnormal.  You may use acetaminophen, ibuprofen, or prescription medication that your doctor may have prescribed for you if you need to do so.     A few common side effects of steroids include facial  flushing, sweating, restlessness, irritability,difficulty sleeping, increase in blood sugar, and increased blood pressure. If you have diabetes, please monitor your blood sugar at least once a day for at least 5 days. If you have poorly controlled high blood pressure, monitoryour blood pressure for at least 2 days and contact your primary care physician if these numbers are unusually high for you.      If you take aspirin or non-steroidal anti-inflammatory drugs (examples are Motrin, Advil, ibuprofen, Naprosyn, Voltaren, Relafen, etc.) you may restart these this evening, but stop taking it 3 days before your next appointment, unless instructed otherwiseby your physician.      You do not need to discontinue non-aspirin-containing pain medications prior to an injection (examples: Celebrex, tramadol, hydrocodone and acetaminophen).      If you take a blood thinning medication (Coumadin, Lovenox, Fragmin,Ticlid, Plavix, Pradaxa, etc.), please discuss this with your primary care physician/cardiologist and your pain physician. These medications MUST be discontinued before you can have an injection safely, without the risk of uncontrolled bleeding. If these medications are not discontinued for an appropriate period of time, you will not be able to receivean injection.      If you are taking Coumadin, please have your INR checked the morning of your procedure and bringthe result to your appointment unless otherwise instructed. If your INR is over 1.2, your injection may need to be rescheduled to avoid uncontrolled bleeding from the needle placement.     Call [facility] at [phone] between 8am-4pm Monday - Friday if you are experiencing the following:    If you received an epidural or spinal injection:    -Headache that doesnot go away with medicine, is worse when sitting or standing up, and is greatly relieved upon lying down.   -Severe pain worse than or different than your baseline pain.   -Chills  or fever (101º F or greater).   -Drainage or signs of infection at the injection site     Go directly to the Emergency Department if you are experiencing the following and received an epidural or spinal injection:   -Abrupt weakness or progressive weakness in your legs that starts after you leave the clinic.   -Abrupt severe or worsening numbness in your legs.   -Inability to urinate after the injection or loss of bowel or bladder control without the urge to defecate or urinate.     If you have a clinical question that cannot wait until your next appointment, please call [phone] between 8am-4pm Monday - Friday or send a [employer] message. We do our best to return all non-emergency messages within 24 hours, Monday - Friday. A nurse or physician will return your message.      If you need to cancel an appointment, please call the scheduling staff at [phone] during normal business hours or leave a message at least 24 hours in advance.     If you are going to be sedated for your next procedure, you MUST have responsible adult who can legally drive accompany you home. You cannot eat or drink for eight hours prior to the planned procedure if you are going to receive sedation. You may take your non-blood thinning medications with a small sip of water.

## 2023-11-20 NOTE — H&P
History Of Present Illness  Contreras De La Cruz is a 68 y.o. male presenting with low back pain secondary to vertebrogenic low back pain     Past Medical History  History reviewed. No pertinent past medical history.    Surgical History  History reviewed. No pertinent surgical history.     Social History  He reports that he has never smoked. His smokeless tobacco use includes chew. He reports current drug use. Drug: Marijuana. He reports that he does not drink alcohol.    Family History  Family History   Problem Relation Name Age of Onset    Heart failure Mother      Cancer Father          Allergies  Iodinated contrast media, Gabapentin, and Niacin-lovastatin    Review of Systems   Constitutional:  Negative for chills, fever and unexpected weight change.   HENT:  Negative for congestion, rhinorrhea, sneezing and sore throat.    Eyes:  Negative for discharge.   Respiratory:  Negative for cough and shortness of breath.    Cardiovascular:  Negative for chest pain, palpitations and leg swelling.   Musculoskeletal:  Negative for gait problem and myalgias.   Skin:  Negative for color change and rash.   Neurological:  Negative for dizziness, weakness, light-headedness, numbness and headaches.   Hematological:  Does not bruise/bleed easily.   Psychiatric/Behavioral:  Negative for dysphoric mood. The patient is not nervous/anxious.         Physical Exam  Vitals and nursing note reviewed.   Constitutional:       General: He is not in acute distress.     Appearance: Normal appearance. He is not ill-appearing.   HENT:      Head: Normocephalic and atraumatic.   Eyes:      Conjunctiva/sclera: Conjunctivae normal.   Pulmonary:      Effort: Pulmonary effort is normal. No respiratory distress.   Musculoskeletal:         General: No swelling or deformity.      Cervical back: Neck supple. No rigidity or tenderness.      Right lower leg: No edema.      Left lower leg: No edema.   Skin:     General: Skin is warm and dry.      Findings: No  bruising, erythema, lesion or rash.   Neurological:      General: No focal deficit present.      Mental Status: He is alert and oriented to person, place, and time.   Psychiatric:         Mood and Affect: Mood normal.          Last Recorded Vitals  Blood pressure 149/83, pulse 56, temperature 36.3 °C (97.3 °F), temperature source Temporal, resp. rate 16, SpO2 98 %.    Relevant Results           Assessment/Plan   Active Problems:  Vertebrogenic low back pain    No changes since last seen in the office. He continues to have low back pain secondary to vertebrogenic low back pain. Will proceed with Intracept at L4, L5, S1 as planned         I spent 10 minutes in the professional and overall care of this patient.      Bartolome Huber MD

## 2023-11-20 NOTE — POST-PROCEDURE NOTE
1735-REPORT RECEIVED.  I BROUGHT PATIENT OVER FROM PACU.  HE IS ALERT AND AWAKE.  3 SMALL DRESSINGS TO LOWER BACK ARE ALL C/D/I NO DRAINAGE.  DENIES ANY PAIN.  SNACK PROVIDED.  SPOUSE AT THE BEDSIDE..    1745-TOLERATING SNACK WITHOUT N&V.    1750-DISCHARGE INSTRUCTIONS REVIEWED WITH PATIENT AND SPOUSE, BOTH VERBALIZED UNDERSTANDING.

## 2023-11-21 ASSESSMENT — PAIN SCALES - GENERAL: PAINLEVEL_OUTOF10: 0 - NO PAIN

## 2023-11-22 ENCOUNTER — TELEPHONE (OUTPATIENT)
Dept: PAIN MEDICINE | Facility: CLINIC | Age: 68
End: 2023-11-22
Payer: MEDICARE

## 2023-11-22 NOTE — TELEPHONE ENCOUNTER
I SPOKE WITH THE PT TODAY REGARDING HIS INTRACEPT PROCEDURE DONE 2 DAYS AGO.  ONLY TRUE DISCOMFORT IS IN HIS RIGHT THIGH.  TAKING TYLENOL AND THC WITH CB  FOR PAIN.  ENCOURAGED THE PT TO MOVE AROUND WITH IN REASON SINCE MOVING WILL HELP WITH HISW DISCOMFORT.  ALSO SUGGESTED HE PUT SOME ICE ON HIS BACK WHEN HE IS SITTING.  I ALSO MENTIONED TO HIM HE COULD TAKE THE OUTSIDE DRSG OFF AND TAKE A SHOWER TONITE OR TOMORROW. MENTIONED THE STERI STRIPS AND TO LET THEM BE TILL THEY FALL OFF.  REMINDED HIM OF HIS FOLLOW UP APPOINTMENT THURSDAY NOV. 30.  GAVE HIM MY PHONE NUMBER  SHOULD HE HAVE ANY QUESTIONS BEFORE THURSDAY.

## 2023-11-24 DIAGNOSIS — E78.5 DYSLIPIDEMIA: Primary | ICD-10-CM

## 2023-11-28 RX ORDER — ATORVASTATIN CALCIUM 40 MG/1
40 TABLET, FILM COATED ORAL DAILY
Qty: 90 TABLET | Refills: 3 | Status: SHIPPED | OUTPATIENT
Start: 2023-11-28 | End: 2024-01-16 | Stop reason: SDUPTHER

## 2023-11-30 ENCOUNTER — OFFICE VISIT (OUTPATIENT)
Dept: PAIN MEDICINE | Facility: CLINIC | Age: 68
End: 2023-11-30
Payer: MEDICARE

## 2023-11-30 VITALS
DIASTOLIC BLOOD PRESSURE: 95 MMHG | SYSTOLIC BLOOD PRESSURE: 159 MMHG | WEIGHT: 210 LBS | HEART RATE: 71 BPM | HEIGHT: 70 IN | BODY MASS INDEX: 30.06 KG/M2 | RESPIRATION RATE: 18 BRPM

## 2023-11-30 DIAGNOSIS — M54.51 VERTEBROGENIC LOW BACK PAIN: Primary | ICD-10-CM

## 2023-11-30 DIAGNOSIS — M54.16 LUMBAR RADICULOPATHY: ICD-10-CM

## 2023-11-30 PROCEDURE — 99213 OFFICE O/P EST LOW 20 MIN: CPT | Performed by: STUDENT IN AN ORGANIZED HEALTH CARE EDUCATION/TRAINING PROGRAM

## 2023-11-30 ASSESSMENT — PAIN - FUNCTIONAL ASSESSMENT: PAIN_FUNCTIONAL_ASSESSMENT: 0-10

## 2023-11-30 ASSESSMENT — PATIENT HEALTH QUESTIONNAIRE - PHQ9
2. FEELING DOWN, DEPRESSED OR HOPELESS: NOT AT ALL
SUM OF ALL RESPONSES TO PHQ9 QUESTIONS 1 AND 2: 0
1. LITTLE INTEREST OR PLEASURE IN DOING THINGS: NOT AT ALL

## 2023-11-30 ASSESSMENT — PAIN DESCRIPTION - DESCRIPTORS: DESCRIPTORS: SHARP;BURNING

## 2023-11-30 ASSESSMENT — PAIN SCALES - GENERAL
PAINLEVEL_OUTOF10: 2
PAINLEVEL: 2

## 2023-11-30 NOTE — PROGRESS NOTES
[facility]  Follow Up Office Visit Note 2023    Patient Information: Contreras De La Cruz, MRN: [id number], : 1955   Primary Care/Referring Physician: Lakeisha Plata, APRN-CNP, [address] / [place] [address]     Chief Complaint: Low back and right leg pain  Interval History: He returns for follow up after L4, L5, S1 Intracept    Today he reports 80% improvement since this procedure and is overall very happy with the results. He did have some soreness after the procedure, but this is improving and he denies any other issues.     Brief History of Pain: Mr. Contreras De La Cruz is a 68 y.o. male with a PMHx of  HTN, HLD, GERD, tobacco use disorder who presents for evaluation of mid/low back, right leg, and tailbone pain.    For reference, he reports pain ongoing for many years, with recent exacerbation of low back and right leg pain. There was no obvious inciting event that caused this worsening pain. This pain worsens primarily with bending forward and lifting. He previously had an SCS in place for 10-15 years but had this removed in 2022 because he did not feel it was providing pain relief any longer. He was previously followed by Dr. Mcneal at [facility] for some time who was doing injections with benefit. Most recently he was followed by Dr. Herrera, who performed lumbar medial branch blocks with benefit, but the patient did not want to continue receiving injections since no sedation was given    Current Pain Medications: Ibuprofen 200-400 mg daily, uses THC gummies  Previously Tried Pain Medications: Tylenol - no benefit. He reports trying a number of medications for pain in the past but is not interested in restarting any of them    Relevant Surgeries: Hx of lumbar spine surgery x2 (last in ) as well as cervical spine surgery ()  Injections: Caudal LIZZETTE - 80% pain relief. Knee steroid injections with minimal relief. Lumbar mbb's with only 30% relief. Reportedly had multiple other  injections with Dr. Mcneal at [facility]  Physical/Occupational Therapy: Has done PT in the past but not recently    Medications:   Current Outpatient Medications   Medication Instructions    acetaminophen (Tylenol Extra Strength) 500 mg tablet 2 tablets, oral, Every 8 hours PRN, Tylenol 8 Hour 500 mg<BR>    atorvastatin (LIPITOR) 40 mg, oral, Daily    b complex 0.4 mg tablet 1 tablet, oral, Daily    DULoxetine (Cymbalta) 60 mg DR capsule 1 capsule, oral, Daily    esomeprazole (NexIUM) 20 mg DR capsule 1 capsule, oral, Daily    glucosam/chondro/herb 149/hyal (GLUCOS CHOND CPLX ADVANCED ORAL) oral    magnesium hydroxide (MAGNESIA ORAL) 500 mg, oral    multivitamin tablet 1 tablet, oral, Daily      Allergies:   Allergies   Allergen Reactions    Iodinated Contrast Media Other    Gabapentin Hives    Niacin-Lovastatin Hives       Past Medical & Surgical History:  Past Medical History:   Diagnosis Date    Chronic pain     Hypertension       Past Surgical History:   Procedure Laterality Date    BACK SURGERY  1988    BACK SURGERY  1998    NECK SURGERY  2003       Family History   Problem Relation Name Age of Onset    Heart failure Mother      Cancer Father       Social History     Socioeconomic History    Marital status:      Spouse name: Not on file    Number of children: Not on file    Years of education: Not on file    Highest education level: Not on file   Occupational History    Not on file   Tobacco Use    Smoking status: Former     Types: Cigarettes    Smokeless tobacco: Current     Types: Chew   Substance and Sexual Activity    Alcohol use: Never    Drug use: Yes     Frequency: 7.0 times per week     Types: Marijuana    Sexual activity: Not on file   Other Topics Concern    Not on file   Social History Narrative    Not on file     Social Determinants of Health     Financial Resource Strain: Not on file   Food Insecurity: Not on file   Transportation Needs: Not on file   Physical Activity: Not on file   Stress: Not  "on file   Social Connections: Not on file   Intimate Partner Violence: Not on file   Housing Stability: Not on file       Problems, Past medical history, past surgical history, Medications, allergies, social and family history reviewed and as per the electronic medical record from today's encounter    Review of Systems:  CONST: No fever, chills, fatigue, weight changes  EYES: No loss of vision  ENT: No hearing loss, tinnitus  CV: No chest pain, palpitations  RESP: No dyspnea, shortness of breath, cough  GI: No stool incontinence, nausea, vomiting  : No urinary incontinence  MSK: No joint swelling  SKIN: No rash, no hives  NEURO: No headache, dizziness, weakness, paresthesias  PSYCH: No anxiety, depression or suicidal ideation  HEM/LYMPH: No easy bruising or bleeding  All other systems reviewed are negative     Physical Exam:  Vitals: BP (!) 159/95 Comment: Gave verbal and written education on hypertension  Pulse 71   Resp 18   Ht 1.778 m (5' 10\")   Wt 95.3 kg (210 lb)   BMI 30.13 kg/m²   General: No apparent distress. Alert, appropriate, oriented x 3. Mood generally positive, affect congruent. Speaking in full sentences.   HENT: Normocephalic, atraumatic. Hearing intact.  Eyes: Pupils equal and round  Neck: Supple, trachea midline  Lungs: Symmetric respiratory excursion on visual exam, nonlabored breathing.   Extremities: No cyanosis or edema noted in extremities.  Skin: No rashes, lesions noted. Small incisions are healing well with no erythema, swelling, or drainage noted  Neuro: Alert and appropriate. Gait within normal limits. Bulk and tone within normal limits.    Laboratory Data:  The following laboratory data were reviewed during this visit:   Lab Results   Component Value Date    WBC 8.7 06/20/2023    RBC 6.61 (H) 06/20/2023    HGB 11.9 (L) 06/20/2023    HCT 40.5 (L) 06/20/2023     06/20/2023      No results found for: \"INR\"  Lab Results   Component Value Date    CREATININE 1.0 06/20/2023    " HGBA1C 5.4 06/20/2023       Imaging:  The following imaging impressions were reviewed by me during this visit:    - 8/17/23 lumbar spine MRI shows L4/5 moderate posterior disc osteophyte complex, postsurgical changes about the right rosalia lamina, moderate right foraminal narrowing. L5/S1 asymmetric left paracentral and foraminal disc bulge likely abutting the descedning left S1 nerve root, postsurgical changes status post left hemilaminectomy. Post gadolinium imaging demonstrates mild left lateral and posterolateral epidural enhancement. No nerve root enhancement demonstrated. Modic changes noted at L5 and S1, and more subtly at L4.  -5/1/23 bilateral knee xray shows moderate OA  -8/11/23 thoracic spine CT shows T10-T11 right foraminal disc protrusion causing moderate right and no left foraminal stenosis or significant canal stenosis.    I also personally reviewed the images from the above studies myself. These images and my interpretation of them contributed to the management and decision making of the patient's medical plan.    ASSESSMENT:  Mr. Contreras De La Cruz is a 68 y.o. male with low back and right leg pain that is consistent with:    1. Vertebrogenic low back pain    2. Lumbar radiculopathy          PLAN:  Radiology: I reviewed his 8/17/23 lumbar spine MRI which shows L4/5 moderate posterior disc osteophyte complex, postsurgical changes about the right rosalia lamina, moderate right foraminal narrowing. L5/S1 asymmetric left paracentral and foraminal disc bulge likely abutting the descedning left S1 nerve root, postsurgical changes status post left hemilaminectomy. Post gadolinium imaging demonstrates mild left lateral and posterolateral epidural enhancement. No nerve root enhancement demonstrated. Modic II changes noted at L5 and S1, and more subtly at L4.    Physically: Has done PT in the past without durable pain relief. Should maintain a regular HEP    Psychologically: No needs at this time    Medication: No  changes to his medications today. He reports undergoing numerous medication trials in the past and is not interested in adding any new medications    Duration: Multiple years    Intervention: I suspect his pain is secondary to lumbar radiculopathy and vertebrogenic low back pain. He has a fairly complex history of multiple back surgeries, multiple injections for pain, as well as SCS. After review of his recent lumbar spine MRI I suspect much of his axial low back pain is secondary to Modic II changes noted at L4, L5, and S1. He underwent Intracept at these levels with 80% improvement so far. Will continue to monitor  - He continues to have right leg pain that is likely radicular in nature. I performed a caudal LIZZETTE with 80% improvement in his right leg pain. Will monitor for duration of pain relief and consider repeating in the future        Sincerely,  Bartolome Huber MD  [facility] - [place]

## 2023-12-19 ENCOUNTER — APPOINTMENT (OUTPATIENT)
Dept: PRIMARY CARE | Facility: CLINIC | Age: 68
End: 2023-12-19
Payer: MEDICARE

## 2024-01-16 ENCOUNTER — OFFICE VISIT (OUTPATIENT)
Dept: PRIMARY CARE | Facility: CLINIC | Age: 69
End: 2024-01-16
Payer: MEDICARE

## 2024-01-16 VITALS
WEIGHT: 212 LBS | DIASTOLIC BLOOD PRESSURE: 82 MMHG | BODY MASS INDEX: 30.42 KG/M2 | SYSTOLIC BLOOD PRESSURE: 138 MMHG | OXYGEN SATURATION: 98 % | TEMPERATURE: 97.7 F | HEART RATE: 77 BPM

## 2024-01-16 DIAGNOSIS — Z23 ENCOUNTER FOR IMMUNIZATION: ICD-10-CM

## 2024-01-16 DIAGNOSIS — M15.9 PRIMARY OSTEOARTHRITIS INVOLVING MULTIPLE JOINTS: ICD-10-CM

## 2024-01-16 DIAGNOSIS — E55.9 VITAMIN D DEFICIENCY: ICD-10-CM

## 2024-01-16 DIAGNOSIS — I10 ESSENTIAL HYPERTENSION: Primary | ICD-10-CM

## 2024-01-16 DIAGNOSIS — G63 POLYNEUROPATHY ASSOCIATED WITH UNDERLYING DISEASE (MULTI): ICD-10-CM

## 2024-01-16 DIAGNOSIS — R73.9 HYPERGLYCEMIA: ICD-10-CM

## 2024-01-16 DIAGNOSIS — E78.2 MIXED HYPERLIPIDEMIA: ICD-10-CM

## 2024-01-16 DIAGNOSIS — Z12.5 ENCOUNTER FOR PROSTATE CANCER SCREENING: ICD-10-CM

## 2024-01-16 DIAGNOSIS — Z01.89 ENCOUNTER FOR ROUTINE LABORATORY TESTING: ICD-10-CM

## 2024-01-16 DIAGNOSIS — K21.9 GASTROESOPHAGEAL REFLUX DISEASE WITHOUT ESOPHAGITIS: ICD-10-CM

## 2024-01-16 PROBLEM — Z98.1 HISTORY OF FUSION OF THORACIC SPINE: Status: ACTIVE | Noted: 2024-01-16

## 2024-01-16 PROBLEM — G89.29 CHRONIC BACK PAIN: Status: RESOLVED | Noted: 2023-09-03 | Resolved: 2024-01-16

## 2024-01-16 PROBLEM — M51.9 THORACIC DISC DISEASE: Status: ACTIVE | Noted: 2024-01-16

## 2024-01-16 PROBLEM — Z98.1 HISTORY OF FUSION OF CERVICAL SPINE: Status: ACTIVE | Noted: 2024-01-16

## 2024-01-16 PROBLEM — G62.9 PERIPHERAL NEUROPATHY: Status: ACTIVE | Noted: 2024-01-16

## 2024-01-16 PROBLEM — Z96.89 SPINAL CORD STIMULATOR STATUS: Status: RESOLVED | Noted: 2023-09-03 | Resolved: 2024-01-16

## 2024-01-16 PROBLEM — M00.9: Status: RESOLVED | Noted: 2024-01-16 | Resolved: 2024-01-16

## 2024-01-16 PROBLEM — M00.9: Status: ACTIVE | Noted: 2024-01-16

## 2024-01-16 PROBLEM — M54.9 CHRONIC BACK PAIN: Status: RESOLVED | Noted: 2023-09-03 | Resolved: 2024-01-16

## 2024-01-16 PROBLEM — Z98.1 HISTORY OF FUSION OF LUMBAR SPINE: Status: ACTIVE | Noted: 2024-01-16

## 2024-01-16 PROCEDURE — 99214 OFFICE O/P EST MOD 30 MIN: CPT | Performed by: STUDENT IN AN ORGANIZED HEALTH CARE EDUCATION/TRAINING PROGRAM

## 2024-01-16 PROCEDURE — 1159F MED LIST DOCD IN RCRD: CPT | Performed by: STUDENT IN AN ORGANIZED HEALTH CARE EDUCATION/TRAINING PROGRAM

## 2024-01-16 PROCEDURE — 90662 IIV NO PRSV INCREASED AG IM: CPT | Performed by: STUDENT IN AN ORGANIZED HEALTH CARE EDUCATION/TRAINING PROGRAM

## 2024-01-16 PROCEDURE — 1125F AMNT PAIN NOTED PAIN PRSNT: CPT | Performed by: STUDENT IN AN ORGANIZED HEALTH CARE EDUCATION/TRAINING PROGRAM

## 2024-01-16 PROCEDURE — 3079F DIAST BP 80-89 MM HG: CPT | Performed by: STUDENT IN AN ORGANIZED HEALTH CARE EDUCATION/TRAINING PROGRAM

## 2024-01-16 PROCEDURE — 1160F RVW MEDS BY RX/DR IN RCRD: CPT | Performed by: STUDENT IN AN ORGANIZED HEALTH CARE EDUCATION/TRAINING PROGRAM

## 2024-01-16 PROCEDURE — 3075F SYST BP GE 130 - 139MM HG: CPT | Performed by: STUDENT IN AN ORGANIZED HEALTH CARE EDUCATION/TRAINING PROGRAM

## 2024-01-16 RX ORDER — MELOXICAM 7.5 MG/1
7.5 TABLET ORAL DAILY
Qty: 30 TABLET | Refills: 1 | Status: SHIPPED | OUTPATIENT
Start: 2024-01-16 | End: 2024-03-06 | Stop reason: SDUPTHER

## 2024-01-16 RX ORDER — ATORVASTATIN CALCIUM 40 MG/1
40 TABLET, FILM COATED ORAL DAILY
Start: 2024-01-16 | End: 2024-03-20 | Stop reason: SDUPTHER

## 2024-01-16 RX ORDER — DULOXETIN HYDROCHLORIDE 60 MG/1
60 CAPSULE, DELAYED RELEASE ORAL DAILY
Start: 2024-01-16

## 2024-01-16 RX ORDER — HYDROGEN PEROXIDE 3 %
20 SOLUTION, NON-ORAL MISCELLANEOUS DAILY
Start: 2024-01-16

## 2024-01-16 RX ORDER — MULTIVITAMIN
1 TABLET ORAL DAILY
Start: 2024-01-16

## 2024-01-16 ASSESSMENT — ENCOUNTER SYMPTOMS
OCCASIONAL FEELINGS OF UNSTEADINESS: 0
GASTROINTESTINAL NEGATIVE: 1
DEPRESSION: 0
LOSS OF SENSATION IN FEET: 0
CARDIOVASCULAR NEGATIVE: 1
RESPIRATORY NEGATIVE: 1
CONSTITUTIONAL NEGATIVE: 1

## 2024-01-16 ASSESSMENT — PAIN SCALES - GENERAL: PAINLEVEL: 8

## 2024-01-16 NOTE — PATIENT INSTRUCTIONS
Diagnoses and all orders for this visit:  Essential hypertension  -     CBC and Auto Differential; Future  Mixed hyperlipidemia  -     atorvastatin (Lipitor) 40 mg tablet; Take 1 tablet (40 mg) by mouth once daily.  -     Lipid Panel; Future  Gastroesophageal reflux disease without esophagitis  -     esomeprazole (NexIUM) 20 mg DR capsule; Take 1 capsule (20 mg) by mouth once daily.  Primary osteoarthritis involving multiple joints  -     XR foot 3+ views bilateral; Future  -     Referral to Podiatry; Future  -     meloxicam (Mobic) 7.5 mg tablet; Take 1 tablet (7.5 mg) by mouth once daily.  -     Follow Up In Primary Care; Future  Polyneuropathy associated with underlying disease (CMS/HCC)  -     multivitamin tablet; Take 1 tablet by mouth once daily.  -     DULoxetine (Cymbalta) 60 mg DR capsule; Take 1 capsule (60 mg) by mouth once daily.  Vitamin D deficiency  -     Vitamin D 25-Hydroxy,Total (for eval of Vitamin D levels); Future  -     Vitamin D 25-Hydroxy,Total (for eval of Vitamin D levels); Future  Encounter for routine laboratory testing  -     Basic Metabolic Panel; Future  -     Vitamin D 25-Hydroxy,Total (for eval of Vitamin D levels); Future  -     CBC and Auto Differential; Future  -     Basic Metabolic Panel; Future  -     Hepatic Function Panel; Future  -     Lipid Panel; Future  -     Hemoglobin A1C; Future  -     Vitamin D 25-Hydroxy,Total (for eval of Vitamin D levels); Future  -     Prostate Specific Antigen; Future  Hyperglycemia  -     Hemoglobin A1C; Future  Encounter for prostate cancer screening  -     Prostate Specific Antigen; Future  Encounter for immunization  -     Flu vaccine, quadrivalent, high-dose, preservative free, age 65y+ (FLUZONE)    - I suspect that the patient's pain in his feet bilaterally is related to potential arthritis. Will check XR. Refer to podiatry. Suspect that the patient will require specific orthotics at some point. Trial meloxicam for pain. Patient to reach out  in one month to let me know how this is working.  - Blood pressure looks good. No changes at this time.  - Otherwise, the patient feels well. Do not need to make significant changes at this time.  - Will order labwork for the patient's next appointment.

## 2024-01-16 NOTE — PROGRESS NOTES
[facility]: [facility]  PROGRESS NOTE      Contreras De La Cruz is a 68 y.o. male that is presenting today for Follow-up.    Assessment/Plan   Diagnoses and all orders for this visit:  Essential hypertension  -     CBC and Auto Differential; Future  Mixed hyperlipidemia  -     atorvastatin (Lipitor) 40 mg tablet; Take 1 tablet (40 mg) by mouth once daily.  -     Lipid Panel; Future  Gastroesophageal reflux disease without esophagitis  -     esomeprazole (NexIUM) 20 mg DR capsule; Take 1 capsule (20 mg) by mouth once daily.  Primary osteoarthritis involving multiple joints  -     XR foot 3+ views bilateral; Future  -     Referral to Podiatry; Future  -     meloxicam (Mobic) 7.5 mg tablet; Take 1 tablet (7.5 mg) by mouth once daily.  -     Follow Up In Primary Care; Future  Polyneuropathy associated with underlying disease (CMS/HCC)  -     multivitamin tablet; Take 1 tablet by mouth once daily.  -     DULoxetine (Cymbalta) 60 mg DR capsule; Take 1 capsule (60 mg) by mouth once daily.  Vitamin D deficiency  -     Vitamin D 25-Hydroxy,Total (for eval of Vitamin D levels); Future  -     Vitamin D 25-Hydroxy,Total (for eval of Vitamin D levels); Future  Encounter for routine laboratory testing  -     Basic Metabolic Panel; Future  -     Vitamin D 25-Hydroxy,Total (for eval of Vitamin D levels); Future  -     CBC and Auto Differential; Future  -     Basic Metabolic Panel; Future  -     Hepatic Function Panel; Future  -     Lipid Panel; Future  -     Hemoglobin A1C; Future  -     Vitamin D 25-Hydroxy,Total (for eval of Vitamin D levels); Future  -     Prostate Specific Antigen; Future  Hyperglycemia  -     Hemoglobin A1C; Future  Encounter for prostate cancer screening  -     Prostate Specific Antigen; Future  Encounter for immunization  -     Flu vaccine, quadrivalent, high-dose, preservative free, age 65y+ (FLUZONE)    - I suspect that the patient's pain in his feet bilaterally is related to potential  arthritis. Will check XR. Refer to podiatry. Suspect that the patient will require specific orthotics at some point. Trial meloxicam for pain. Patient to reach out in one month to let me know how this is working.  - Blood pressure looks good. No changes at this time.  - Otherwise, the patient feels well. Do not need to make significant changes at this time.  - Will order labwork for the patient's next appointment.    Subjective   - Patient was seen in the office in October 2023 with concern for potential foot infection on the R-second toe. Patient was treated with steroids and antibiotics without significant improvement in symptoms. Patient now noting that he is having L-foot involvement as well. Patient denying any type of rash or skin involvement. Patient's main symptoms are pain with walking, regardless of shoes, right at the base of the second toe bilaterally.  - The patient otherwise feels well and denies any acute symptoms or concerns at this time.  - The patient denies any changes or progression of their chronic medical problems.  - The patient denies any problems or concerns with their medications.      Review of Systems   Constitutional: Negative.    Respiratory: Negative.     Cardiovascular: Negative.    Gastrointestinal: Negative.       Objective   Vitals:    01/16/24 1522   BP: 138/82   Pulse: 77   Temp: 36.5 °C (97.7 °F)   SpO2: 98%      Body mass index is 30.42 kg/m².  Physical Exam  Constitutional:       General: He is not in acute distress.  Neck:      Vascular: No carotid bruit.   Cardiovascular:      Rate and Rhythm: Normal rate and regular rhythm.      Heart sounds: Normal heart sounds.   Pulmonary:      Effort: Pulmonary effort is normal.      Breath sounds: Normal breath sounds.   Musculoskeletal:         General: No swelling.      Right foot: Normal range of motion. No deformity or bunion.      Left foot: Normal range of motion. No deformity or bunion.   Feet:      Right foot:      Skin  "integrity: Skin integrity normal.      Toenail Condition: Right toenails are normal.      Left foot:      Skin integrity: Skin integrity normal.      Toenail Condition: Left toenails are normal.      Comments: No evidence of rash on feet bilaterally. Relatively nontender to pain.  Neurological:      Mental Status: He is alert. Mental status is at baseline.   Psychiatric:         Mood and Affect: Mood normal.       Diagnostic Results   Lab Results   Component Value Date    GLUCOSE 103 (H) 06/20/2023    CALCIUM 9.7 06/20/2023     06/20/2023    K 4.8 06/20/2023    CO2 27 06/20/2023     06/20/2023    BUN 23 06/20/2023    CREATININE 1.0 06/20/2023     Lab Results   Component Value Date    ALT 26 06/20/2023    AST 20 06/20/2023    ALKPHOS 72 06/20/2023    BILITOT 0.4 06/20/2023     Lab Results   Component Value Date    WBC 8.7 06/20/2023    HGB 11.9 (L) 06/20/2023    HCT 40.5 (L) 06/20/2023    MCV 61.3 (L) 06/20/2023     06/20/2023     Lab Results   Component Value Date    CHOL 169 06/20/2023    CHOL 183 05/19/2022    CHOL 155 11/11/2021     Lab Results   Component Value Date    HDL 42 06/20/2023    HDL 48 05/19/2022    HDL 46 11/11/2021     Lab Results   Component Value Date    LDLCALC 104 06/20/2023    LDLCALC 115 05/19/2022    LDLCALC 93 11/11/2021     Lab Results   Component Value Date    TRIG 116 06/20/2023    TRIG 98 05/19/2022    TRIG 82 11/11/2021     No components found for: \"CHOLHDL\"  Lab Results   Component Value Date    HGBA1C 5.4 06/20/2023     Other labs not included in the list above were reviewed either before or during this encounter.    History    Past Medical History:   Diagnosis Date    Chronic pain     Hypertension      Past Surgical History:   Procedure Laterality Date    BACK SURGERY  1988    BACK SURGERY  1998    NECK SURGERY  2003     Family History   Problem Relation Name Age of Onset    Heart failure Mother      Cancer Father       Social History     Socioeconomic History    " Marital status:      Spouse name: Not on file    Number of children: Not on file    Years of education: Not on file    Highest education level: Not on file   Occupational History    Not on file   Tobacco Use    Smoking status: Former     Types: Cigarettes    Smokeless tobacco: Current     Types: Chew   Substance and Sexual Activity    Alcohol use: Never    Drug use: Yes     Frequency: 7.0 times per week     Types: Marijuana    Sexual activity: Not on file   Other Topics Concern    Not on file   Social History Narrative    Not on file     Social Determinants of Health     Financial Resource Strain: Not on file   Food Insecurity: Not on file   Transportation Needs: Not on file   Physical Activity: Not on file   Stress: Not on file   Social Connections: Not on file   Intimate Partner Violence: Not on file   Housing Stability: Not on file     Allergies   Allergen Reactions    Iodinated Contrast Media Other    Gabapentin Hives    Niacin-Lovastatin Hives     Current Outpatient Medications on File Prior to Visit   Medication Sig Dispense Refill    acetaminophen (Tylenol Extra Strength) 500 mg tablet Take 2 tablets (1,000 mg) by mouth every 8 hours if needed. Tylenol 8 Hour 500 mg      b complex 0.4 mg tablet Take 1 tablet by mouth once daily.      glucosam/chondro/herb 149/hyal (GLUCOS CHOND CPLX ADVANCED ORAL) Take by mouth.      magnesium hydroxide (MAGNESIA ORAL) Take 500 mg by mouth.      [DISCONTINUED] atorvastatin (Lipitor) 40 mg tablet TAKE 1 TABLET BY MOUTH EVERY DAY 90 tablet 3    [DISCONTINUED] DULoxetine (Cymbalta) 60 mg DR capsule Take 1 capsule (60 mg) by mouth once daily.      [DISCONTINUED] esomeprazole (NexIUM) 20 mg DR capsule Take 1 capsule (20 mg) by mouth once daily.      [DISCONTINUED] multivitamin tablet Take 1 tablet by mouth once daily.       No current facility-administered medications on file prior to visit.     Immunization History   Administered Date(s) Administered    Flu vaccine,  quadrivalent, high-dose, preservative free, age 65y+ (FLUZONE) 11/17/2021, 11/29/2022    Influenza, injectable, quadrivalent 10/22/2018, 09/15/2020    Pfizer COVID-19 vaccine, bivalent, age 12 years and older (30 mcg/0.3 mL) 09/07/2022, 06/30/2023    Pfizer Gray Cap SARS-CoV-2 04/06/2022    Pfizer Purple Cap SARS-CoV-2 03/17/2021, 04/07/2021, 04/16/2021, 04/28/2021, 11/22/2021    Pneumococcal conjugate vaccine, 20-valent (PREVNAR 20) 06/13/2023    Tdap vaccine, age 7 year and older (BOOSTRIX) 11/11/2015, 05/16/2016    Zoster vaccine, recombinant, adult (SHINGRIX) 06/29/2023, 08/27/2023    Zoster, live 05/16/2016     Patient's medical history was reviewed and updated either before or during this encounter.       Bryan Esparza MD

## 2024-01-17 ENCOUNTER — APPOINTMENT (OUTPATIENT)
Dept: PAIN MEDICINE | Facility: CLINIC | Age: 69
End: 2024-01-17
Payer: MEDICARE

## 2024-01-23 ENCOUNTER — HOSPITAL ENCOUNTER (OUTPATIENT)
Dept: RADIOLOGY | Facility: CLINIC | Age: 69
Discharge: HOME | End: 2024-01-23
Payer: MEDICARE

## 2024-01-23 DIAGNOSIS — M15.9 PRIMARY OSTEOARTHRITIS INVOLVING MULTIPLE JOINTS: ICD-10-CM

## 2024-01-23 PROCEDURE — 73630 X-RAY EXAM OF FOOT: CPT | Mod: 50

## 2024-01-23 NOTE — PROGRESS NOTES
[facility]  Follow Up Office Visit Note 2024    Patient Information: Contreras De La Cruz, MRN: [id number], : 1955   Primary Care/Referring Physician: Bryan Esparza MD, [address] [place] [address] / [place] [address]     Chief Complaint: Low back and right leg pain  Interval History: He returns for follow up     Today he reports that his low back pain remains very well controlled and he is very happy about his response to the Intracept procedure.     He has been having more right buttock and groin pain since last seen. This worsens primarily with prolonged sitting and when going up and down stairs.  He denies any numbness, tingling, or weakness in the right leg. He was also recently started on Meloxicam by his PCP for foot pain and feels this is providing some relief. He is planning to see podiatry soon regarding this foot pain.     Brief History of Pain: Mr. Contreras De La Cruz is a 68 y.o. male with a PMHx of  HTN, HLD, GERD, tobacco use disorder who presents for evaluation of mid/low back, right leg, and tailbone pain.    For reference, he reports pain ongoing for many years, with recent exacerbation of low back and right leg pain. There was no obvious inciting event that caused this worsening pain. This pain worsens primarily with bending forward and lifting. He previously had an SCS in place for 10-15 years but had this removed in 2022 because he did not feel it was providing pain relief any longer. He was previously followed by Dr. Mcneal at [facility] for some time who was doing injections with benefit. Most recently he was followed by Dr. Herrera, who performed lumbar medial branch blocks with benefit, but the patient did not want to continue receiving injections since no sedation was given    Current Pain Medications: Meloxicam 7.5 mg daily, uses THC gummies, Duloxetine 60 mg daily  Previously Tried Pain Medications: Tylenol - no benefit. He reports trying a number of medications for pain in the  past but is not interested in restarting any of them    Relevant Surgeries: Hx of lumbar spine surgery x2 (last in 1990's) as well as cervical spine surgery (2003). Intracept at L4, L5, S1 - 80% pain relief.   Injections: Caudal LIZZETTE - 80% pain relief. Knee steroid injections with minimal relief. Lumbar mbb's with only 30% relief. Reportedly had multiple other injections with Dr. Mcneal at [facility]  Physical/Occupational Therapy: Has done PT in the past but not recently    Medications:   Current Outpatient Medications   Medication Instructions    acetaminophen (Tylenol Extra Strength) 500 mg tablet 2 tablets, oral, Every 8 hours PRN, Tylenol 8 Hour 500 mg<BR>    atorvastatin (LIPITOR) 40 mg, oral, Daily    b complex 0.4 mg tablet 1 tablet, oral, Daily    DULoxetine (CYMBALTA) 60 mg, oral, Daily    esomeprazole (NEXIUM) 20 mg, oral, Daily    glucosam/chondro/herb 149/hyal (GLUCOS CHOND CPLX ADVANCED ORAL) oral    magnesium hydroxide (MAGNESIA ORAL) 500 mg, oral    meloxicam (MOBIC) 7.5 mg, oral, Daily    multivitamin tablet 1 tablet, oral, Daily      Allergies:   Allergies   Allergen Reactions    Iodinated Contrast Media Other    Gabapentin Hives    Niacin-Lovastatin Hives       Past Medical & Surgical History:  Past Medical History:   Diagnosis Date    Chronic pain     Hypertension       Past Surgical History:   Procedure Laterality Date    BACK SURGERY  1988    BACK SURGERY  1998    NECK SURGERY  2003       Family History   Problem Relation Name Age of Onset    Heart failure Mother      Cancer Father       Social History     Socioeconomic History    Marital status:      Spouse name: Not on file    Number of children: Not on file    Years of education: Not on file    Highest education level: Not on file   Occupational History    Not on file   Tobacco Use    Smoking status: Former     Types: Cigarettes    Smokeless tobacco: Current     Types: Chew   Substance and Sexual Activity    Alcohol use: Never    Drug use:  "Yes     Frequency: 7.0 times per week     Types: Marijuana     Comment: THC 10 mg dailt    Sexual activity: Not on file   Other Topics Concern    Not on file   Social History Narrative    Not on file     Social Determinants of Health     Financial Resource Strain: Not on file   Food Insecurity: Not on file   Transportation Needs: Not on file   Physical Activity: Not on file   Stress: Not on file   Social Connections: Not on file   Intimate Partner Violence: Not on file   Housing Stability: Not on file       Problems, Past medical history, past surgical history, Medications, allergies, social and family history reviewed and as per the electronic medical record from today's encounter    Review of Systems:  CONST: No fever, chills, fatigue, weight changes  EYES: No loss of vision  ENT: No hearing loss, tinnitus  CV: No chest pain, palpitations  RESP: No dyspnea, shortness of breath, cough  GI: No stool incontinence, nausea, vomiting  : No urinary incontinence  MSK: No joint swelling  SKIN: No rash, no hives  NEURO: No headache, dizziness  PSYCH: No anxiety, depression  HEM/LYMPH: No easy bruising or bleeding  All other systems reviewed are negative     Physical Exam:  Vitals: /84   Pulse 64   Ht 1.778 m (5' 10\")   Wt 95.3 kg (210 lb)   BMI 30.13 kg/m²   General: No apparent distress. Alert, appropriate, oriented x 3. Mood generally positive, affect congruent. Speaking in full sentences.   HENT: Normocephalic, atraumatic. Hearing intact.  Eyes: Pupils equal and round  Neck: Supple, trachea midline  Lungs: Symmetric respiratory excursion on visual exam, nonlabored breathing.   Extremities: No cyanosis or edema noted in extremities.  Skin: No rashes, lesions noted.   MSK: Right hip scour causes some right groin pain, FADIR maneuver negative  Back: Reports pain to palpation overlying right SI joint. Veena finger test positive on the right. KENDALL test positive on the right, Gaenslen's test positive on the " "right, sacral compression test positive on the right  Neuro: Alert and appropriate. Gait within normal limits. Bulk and tone within normal limits.    Laboratory Data:  The following laboratory data were reviewed during this visit:   Lab Results   Component Value Date    WBC 8.7 06/20/2023    RBC 6.61 (H) 06/20/2023    HGB 11.9 (L) 06/20/2023    HCT 40.5 (L) 06/20/2023     06/20/2023      No results found for: \"INR\"  Lab Results   Component Value Date    CREATININE 1.0 06/20/2023    HGBA1C 5.4 06/20/2023       Imaging:  The following imaging impressions were reviewed by me during this visit:    - 8/17/23 lumbar spine MRI shows L4/5 moderate posterior disc osteophyte complex, postsurgical changes about the right rosalia lamina, moderate right foraminal narrowing. L5/S1 asymmetric left paracentral and foraminal disc bulge likely abutting the descedning left S1 nerve root, postsurgical changes status post left hemilaminectomy. Post gadolinium imaging demonstrates mild left lateral and posterolateral epidural enhancement. No nerve root enhancement demonstrated. Modic changes noted at L5 and S1, and more subtly at L4.  -5/1/23 bilateral knee xray shows moderate OA  -8/11/23 thoracic spine CT shows T10-T11 right foraminal disc protrusion causing moderate right and no left foraminal stenosis or significant canal stenosis.    I also personally reviewed the images from the above studies myself. These images and my interpretation of them contributed to the management and decision making of the patient's medical plan.    ASSESSMENT:  Mr. Contreras De La Cruz is a 68 y.o. male with low back and right leg pain that is consistent with:    1. Arthropathy of right sacroiliac joint    2. Vertebrogenic low back pain    3. Lumbar radiculopathy            PLAN:  Radiology: I reviewed his 8/17/23 lumbar spine MRI which shows L4/5 moderate posterior disc osteophyte complex, postsurgical changes about the right roaslia lamina, moderate right " foraminal narrowing. L5/S1 asymmetric left paracentral and foraminal disc bulge likely abutting the descedning left S1 nerve root, postsurgical changes status post left hemilaminectomy. Post gadolinium imaging demonstrates mild left lateral and posterolateral epidural enhancement. No nerve root enhancement demonstrated. Modic II changes noted at L5 and S1, and more subtly at L4.    Physically: Has done PT in the past without durable pain relief. Should maintain a regular HEP    Psychologically: No needs at this time    Medication: No changes to his medications today. He reports undergoing numerous medication trials in the past and is not interested in adding any new medications    Duration: Multiple years    Intervention: I suspect his pain is secondary to right sacroiliac joint arthropathy, lumbar radiculopathy and vertebrogenic low back pain. He has a fairly complex history of multiple back surgeries, multiple injections for pain, as well as SCS. He underwent Intracept at L4, L5, and S1 with 80% improvement in his axial back pain  - He has right leg pain that is likely radicular in nature. I performed a caudal LIZZETTE with 80% improvement in his right leg pain.   - He continues to have right buttock and groin pain which is most consistent with right sacroiliac joint arthropathy. He has pain with multiple SI joint provocative maneuvers on exam today. I believe he would benefit from a right SI joint injection utilizing live fluoroscopy and local anesthesia, with consideration for SI joint fusion in the future        Sincerely,  Bartolome Huber MD  [facility] - [place]

## 2024-01-24 ENCOUNTER — OFFICE VISIT (OUTPATIENT)
Dept: PAIN MEDICINE | Facility: CLINIC | Age: 69
End: 2024-01-24
Payer: MEDICARE

## 2024-01-24 VITALS
BODY MASS INDEX: 30.06 KG/M2 | SYSTOLIC BLOOD PRESSURE: 140 MMHG | WEIGHT: 210 LBS | HEIGHT: 70 IN | DIASTOLIC BLOOD PRESSURE: 84 MMHG | HEART RATE: 64 BPM

## 2024-01-24 DIAGNOSIS — M54.16 LUMBAR RADICULOPATHY: ICD-10-CM

## 2024-01-24 DIAGNOSIS — M54.51 VERTEBROGENIC LOW BACK PAIN: ICD-10-CM

## 2024-01-24 DIAGNOSIS — M47.818 ARTHROPATHY OF RIGHT SACROILIAC JOINT: Primary | ICD-10-CM

## 2024-01-24 PROCEDURE — 3077F SYST BP >= 140 MM HG: CPT | Performed by: STUDENT IN AN ORGANIZED HEALTH CARE EDUCATION/TRAINING PROGRAM

## 2024-01-24 PROCEDURE — 99214 OFFICE O/P EST MOD 30 MIN: CPT | Performed by: STUDENT IN AN ORGANIZED HEALTH CARE EDUCATION/TRAINING PROGRAM

## 2024-01-24 PROCEDURE — 1125F AMNT PAIN NOTED PAIN PRSNT: CPT | Performed by: STUDENT IN AN ORGANIZED HEALTH CARE EDUCATION/TRAINING PROGRAM

## 2024-01-24 PROCEDURE — 3079F DIAST BP 80-89 MM HG: CPT | Performed by: STUDENT IN AN ORGANIZED HEALTH CARE EDUCATION/TRAINING PROGRAM

## 2024-01-24 PROCEDURE — 1160F RVW MEDS BY RX/DR IN RCRD: CPT | Performed by: STUDENT IN AN ORGANIZED HEALTH CARE EDUCATION/TRAINING PROGRAM

## 2024-01-24 PROCEDURE — 1159F MED LIST DOCD IN RCRD: CPT | Performed by: STUDENT IN AN ORGANIZED HEALTH CARE EDUCATION/TRAINING PROGRAM

## 2024-01-24 ASSESSMENT — PAIN DESCRIPTION - DESCRIPTORS: DESCRIPTORS: SHARP

## 2024-01-24 ASSESSMENT — PAIN SCALES - GENERAL
PAINLEVEL_OUTOF10: 3
PAINLEVEL: 3

## 2024-01-24 ASSESSMENT — PAIN - FUNCTIONAL ASSESSMENT: PAIN_FUNCTIONAL_ASSESSMENT: 0-10

## 2024-01-25 DIAGNOSIS — M47.818 ARTHROPATHY OF RIGHT SACROILIAC JOINT: Primary | ICD-10-CM

## 2024-02-02 ENCOUNTER — HOSPITAL ENCOUNTER (OUTPATIENT)
Dept: RADIOLOGY | Facility: CLINIC | Age: 69
Discharge: HOME | End: 2024-02-02
Payer: MEDICARE

## 2024-02-02 ENCOUNTER — OUTSIDE PROCEDURE (OUTPATIENT)
Dept: PAIN MEDICINE | Facility: CLINIC | Age: 69
End: 2024-02-02
Payer: MEDICARE

## 2024-02-02 DIAGNOSIS — M47.818 ARTHROPATHY OF RIGHT SACROILIAC JOINT: Primary | ICD-10-CM

## 2024-02-02 DIAGNOSIS — M47.818 SPONDYLOSIS WITHOUT MYELOPATHY OR RADICULOPATHY, SACRAL AND SACROCOCCYGEAL REGION: ICD-10-CM

## 2024-02-02 DIAGNOSIS — M47.818 ARTHROPATHY OF SACROILIAC JOINT: Primary | ICD-10-CM

## 2024-02-02 PROCEDURE — 77003 FLUOROGUIDE FOR SPINE INJECT: CPT | Mod: RSC

## 2024-02-02 PROCEDURE — 27096 INJECT SACROILIAC JOINT: CPT | Performed by: STUDENT IN AN ORGANIZED HEALTH CARE EDUCATION/TRAINING PROGRAM

## 2024-02-02 NOTE — PROGRESS NOTES
Procedure Note: 2024    PROCEDURE NAME: Right sacroiliac joint injection    Patient Information: Contreras De La Cruz, MRN (from [facility]) [id number], : 1955  Chief Complaint: Right buttock pain    Pain Diagnosis: The diagnosis of Arthropathy of sacroiliac joint  has been made given the patient's clinical evaluation at prior evaluation.      DESCRIPTION OF PROCEDURE:    Contreras De La Cruz was brought to the procedure room. The assistant obtained vital signs, which were found to be stable. He was then informed of the procedure, its benefits, and its risks, and his questions were answered regarding the procedure. Written informed consent was obtained from the patient. Immediately prior to starting the procedure, a time-out safety check was conducted and the patient's identification, procedure name, and procedure site were confirmed with the patient.    Right Sacroiliac Joint Injection:  After informed written consent was obtained, the patient was placed in prone position with a pillow under the abdomen to reduce lumbar lordosis. Monitoring of pulse oximetry, heart rate, and blood pressure was done pre-procedure, and post-procedure. During the procedure the patient was monitored with continuous pulse-ox. A time out was performed before the beginning of the procedure. The correct site and laterality were marked. The skin was prepped with chlorhexidine, allowed to dry for a minimum of 3 minutes, and draped in a sterile fashion     AP and oblique fluoroscopy imaging was used to identify the right.  SI joint sacroiliac joint. The fluoroscope was rotated to the contralateral oblique position of the joint to identify the point of maximal lucency between the sacrum and the medial border of the iliac crest. The fluoroscope was then tilted approximately 10 degrees caudad and the inferior pole of the sacroiliac joint was identified. The skin and subcutaneous tissue were then anesthetized with infiltration of 3 mL of 1% lidocaine with a  1.5 in. 25 g needle. A 3.5 inch 22 G spinal needle was then advanced in a co-axial view towards the intraarticular space at the inferior pole. Once a change in resistance was encountered indicating entry into the joint space, a lateral view confirmed the needle tip position to be intraarticular. After negative aspiration for blood, mixture of 40 mg of methylprednisolone (40 mg/mL) diluted in 1 mL of ropivacaine 0.2% was then injected. The stylet was then replaced and the needle was withdrawn.      Anesthesia: local anesthesia   Complications: none      Bartolome Huber MD

## 2024-02-29 ENCOUNTER — APPOINTMENT (OUTPATIENT)
Dept: PAIN MEDICINE | Facility: CLINIC | Age: 69
End: 2024-02-29
Payer: MEDICARE

## 2024-03-04 ENCOUNTER — TELEPHONE (OUTPATIENT)
Dept: PRIMARY CARE | Facility: CLINIC | Age: 69
End: 2024-03-04
Payer: MEDICARE

## 2024-03-04 DIAGNOSIS — M15.9 PRIMARY OSTEOARTHRITIS INVOLVING MULTIPLE JOINTS: ICD-10-CM

## 2024-03-04 NOTE — TELEPHONE ENCOUNTER
Patient states that meloxicam has been really working well. Patient requesting refill to [facility].

## 2024-03-04 NOTE — PROGRESS NOTES
[facility]  Follow Up Office Visit Note 3/5/2024    Patient Information: Contreras De La Cruz, MRN: [id number], : 1955   Primary Care/Referring Physician: Bryan Esparza MD, [address] [place] [address] / [place] [address]     Chief Complaint: Low back and right leg pain  Interval History: He returns for follow up after right SI joint injection    Today he reports 100% pain relief after this injection, with resolution of both the right buttock and groin pain. His low back pain remains very well controlled and he is very happy about his response to the Intracept procedure.    Brief History of Pain: Mr. Contreras De La Cruz is a 69 y.o. male with a PMHx of  HTN, HLD, GERD, tobacco use disorder who presents for evaluation of mid/low back, right leg, and tailbone pain.    For reference, he reports pain ongoing for many years, with recent exacerbation of low back and right leg pain. There was no obvious inciting event that caused this worsening pain. This pain worsens primarily with bending forward and lifting. He previously had an SCS in place for 10-15 years but had this removed in 2022 because he did not feel it was providing pain relief any longer. He was previously followed by Dr. Mcneal at [facility] for some time who was doing injections with benefit. Most recently he was followed by Dr. Herrera, who performed lumbar medial branch blocks with benefit, but the patient did not want to continue receiving injections since no sedation was given    Current Pain Medications: Meloxicam 7.5 mg daily, uses THC gummies, Duloxetine 60 mg daily  Previously Tried Pain Medications: Tylenol - no benefit. He reports trying a number of medications for pain in the past but is not interested in restarting any of them    Relevant Surgeries: Hx of lumbar spine surgery x2 (last in 's) as well as cervical spine surgery (). Intracept at L4, L5, S1 - 80% pain relief.   Injections: Caudal LIZZETTE - 80% pain relief. Right SI joint  injection - 100% pain relief. Knee steroid injections with minimal relief. Lumbar mbb's with only 30% relief. Reportedly had multiple other injections with Dr. Mcneal at [facility]   Physical/Occupational Therapy: Has done PT in the past but not recently    Medications:   Current Outpatient Medications   Medication Instructions    acetaminophen (Tylenol Extra Strength) 500 mg tablet 2 tablets, oral, Every 8 hours PRN, Tylenol 8 Hour 500 mg<BR>    atorvastatin (LIPITOR) 40 mg, oral, Daily    b complex 0.4 mg tablet 1 tablet, oral, Daily    DULoxetine (CYMBALTA) 60 mg, oral, Daily    esomeprazole (NEXIUM) 20 mg, oral, Daily    glucosam/chondro/herb 149/hyal (GLUCOS CHOND CPLX ADVANCED ORAL) oral    magnesium hydroxide (MAGNESIA ORAL) 500 mg, oral    meloxicam (MOBIC) 7.5 mg, oral, Daily    multivitamin tablet 1 tablet, oral, Daily      Allergies:   Allergies   Allergen Reactions    Iodinated Contrast Media Other    Gabapentin Hives    Niacin-Lovastatin Hives       Past Medical & Surgical History:  Past Medical History:   Diagnosis Date    Chronic pain     Hypertension       Past Surgical History:   Procedure Laterality Date    BACK SURGERY  1988    BACK SURGERY  1998    NECK SURGERY  2003       Family History   Problem Relation Name Age of Onset    Heart failure Mother      Cancer Father       Social History     Socioeconomic History    Marital status:      Spouse name: Not on file    Number of children: Not on file    Years of education: Not on file    Highest education level: Not on file   Occupational History    Not on file   Tobacco Use    Smoking status: Former     Types: Cigarettes    Smokeless tobacco: Current     Types: Chew   Substance and Sexual Activity    Alcohol use: Never    Drug use: Yes     Frequency: 7.0 times per week     Types: Marijuana     Comment: THC 10 mg dailt    Sexual activity: Not on file   Other Topics Concern    Not on file   Social History Narrative    Not on file     Social Determinants  "of Health     Financial Resource Strain: Not on file   Food Insecurity: Not on file   Transportation Needs: Not on file   Physical Activity: Not on file   Stress: Not on file   Social Connections: Not on file   Intimate Partner Violence: Not on file   Housing Stability: Not on file       Problems, Past medical history, past surgical history, Medications, allergies, social and family history reviewed and as per the electronic medical record from today's encounter    Review of Systems:  CONST: No fever, chills, fatigue, weight changes  EYES: No loss of vision  ENT: No hearing loss, tinnitus  CV: No chest pain, palpitations  RESP: No dyspnea, shortness of breath, cough  GI: No stool incontinence, nausea, vomiting  : No urinary incontinence  MSK: No joint swelling  SKIN: No rash, no hives  NEURO: No headache, dizziness  PSYCH: No anxiety, depression  HEM/LYMPH: No easy bruising or bleeding  All other systems reviewed are negative     Physical Exam:  Vitals: /82   Pulse 75   Resp 18   Ht 1.778 m (5' 10\")   Wt 95.3 kg (210 lb)   BMI 30.13 kg/m²   General: No apparent distress. Alert, appropriate, oriented x 3. Mood generally positive, affect congruent. Speaking in full sentences.   HENT: Normocephalic, atraumatic. Hearing intact.  Eyes: Pupils equal and round  Neck: Supple, trachea midline  Lungs: Symmetric respiratory excursion on visual exam, nonlabored breathing.   Extremities: No cyanosis or edema noted in extremities.  Skin: No rashes, lesions noted.  Neuro: Alert and appropriate. Gait within normal limits. Bulk and tone within normal limits.    Laboratory Data:  The following laboratory data were reviewed during this visit:   Lab Results   Component Value Date    WBC 8.7 06/20/2023    RBC 6.61 (H) 06/20/2023    HGB 11.9 (L) 06/20/2023    HCT 40.5 (L) 06/20/2023     06/20/2023      No results found for: \"INR\"  Lab Results   Component Value Date    CREATININE 1.0 06/20/2023    HGBA1C 5.4 06/20/2023 "       Imaging:  The following imaging impressions were reviewed by me during this visit:    - 8/17/23 lumbar spine MRI shows L4/5 moderate posterior disc osteophyte complex, postsurgical changes about the right rosalia lamina, moderate right foraminal narrowing. L5/S1 asymmetric left paracentral and foraminal disc bulge likely abutting the descedning left S1 nerve root, postsurgical changes status post left hemilaminectomy. Post gadolinium imaging demonstrates mild left lateral and posterolateral epidural enhancement. No nerve root enhancement demonstrated. Modic changes noted at L5 and S1, and more subtly at L4.  -5/1/23 bilateral knee xray shows moderate OA  -8/11/23 thoracic spine CT shows T10-T11 right foraminal disc protrusion causing moderate right and no left foraminal stenosis or significant canal stenosis.    I also personally reviewed the images from the above studies myself. These images and my interpretation of them contributed to the management and decision making of the patient's medical plan.    ASSESSMENT:  Mr. Contreras De La Cruz is a 69 y.o. male with low back and right leg pain that is consistent with:    1. Arthropathy of right sacroiliac joint    2. Vertebrogenic low back pain    3. Lumbar radiculopathy        PLAN:  Radiology: I reviewed his 8/17/23 lumbar spine MRI which shows L4/5 moderate posterior disc osteophyte complex, postsurgical changes about the right rosalia lamina, moderate right foraminal narrowing. L5/S1 asymmetric left paracentral and foraminal disc bulge likely abutting the descedning left S1 nerve root, postsurgical changes status post left hemilaminectomy. Post gadolinium imaging demonstrates mild left lateral and posterolateral epidural enhancement. No nerve root enhancement demonstrated. Modic II changes noted at L5 and S1, and more subtly at L4.    Physically: Has done PT in the past without durable pain relief. Should maintain a regular HEP    Psychologically: No needs at this  time    Medication: No changes to his medications today. He reports undergoing numerous medication trials in the past and is not interested in adding any new medications    Duration: Multiple years    Intervention: I suspect his pain is secondary to right sacroiliac joint arthropathy, lumbar radiculopathy and vertebrogenic low back pain. He has a fairly complex history of multiple back surgeries, multiple injections for pain, as well as SCS. He underwent Intracept at L4, L5, and S1 with 80% improvement in his axial back pain  - He has right leg pain that is likely radicular in nature. I performed a caudal LIZZETTE with 80% improvement in his right leg pain.   - He continues to have right buttock and groin pain which is most consistent with right sacroiliac joint arthropathy. He underwent a right SI joint injection with 100% pain relief. Will monitor for duration of pain relief and can consider repeating vs. SI joint fusion in the future        Sincerely,  Bartolome Huber MD  [facility] [place]

## 2024-03-05 ENCOUNTER — OFFICE VISIT (OUTPATIENT)
Dept: PAIN MEDICINE | Facility: CLINIC | Age: 69
End: 2024-03-05
Payer: MEDICARE

## 2024-03-05 VITALS
BODY MASS INDEX: 30.06 KG/M2 | HEIGHT: 70 IN | SYSTOLIC BLOOD PRESSURE: 128 MMHG | WEIGHT: 210 LBS | RESPIRATION RATE: 18 BRPM | HEART RATE: 75 BPM | DIASTOLIC BLOOD PRESSURE: 82 MMHG

## 2024-03-05 DIAGNOSIS — M54.16 LUMBAR RADICULOPATHY: ICD-10-CM

## 2024-03-05 DIAGNOSIS — M54.51 VERTEBROGENIC LOW BACK PAIN: ICD-10-CM

## 2024-03-05 DIAGNOSIS — M47.818 ARTHROPATHY OF RIGHT SACROILIAC JOINT: Primary | ICD-10-CM

## 2024-03-05 PROCEDURE — 1160F RVW MEDS BY RX/DR IN RCRD: CPT | Performed by: STUDENT IN AN ORGANIZED HEALTH CARE EDUCATION/TRAINING PROGRAM

## 2024-03-05 PROCEDURE — 1159F MED LIST DOCD IN RCRD: CPT | Performed by: STUDENT IN AN ORGANIZED HEALTH CARE EDUCATION/TRAINING PROGRAM

## 2024-03-05 PROCEDURE — 1126F AMNT PAIN NOTED NONE PRSNT: CPT | Performed by: STUDENT IN AN ORGANIZED HEALTH CARE EDUCATION/TRAINING PROGRAM

## 2024-03-05 PROCEDURE — 3079F DIAST BP 80-89 MM HG: CPT | Performed by: STUDENT IN AN ORGANIZED HEALTH CARE EDUCATION/TRAINING PROGRAM

## 2024-03-05 PROCEDURE — 3074F SYST BP LT 130 MM HG: CPT | Performed by: STUDENT IN AN ORGANIZED HEALTH CARE EDUCATION/TRAINING PROGRAM

## 2024-03-05 PROCEDURE — 99213 OFFICE O/P EST LOW 20 MIN: CPT | Performed by: STUDENT IN AN ORGANIZED HEALTH CARE EDUCATION/TRAINING PROGRAM

## 2024-03-05 ASSESSMENT — PAIN - FUNCTIONAL ASSESSMENT: PAIN_FUNCTIONAL_ASSESSMENT: 0-10

## 2024-03-05 ASSESSMENT — PATIENT HEALTH QUESTIONNAIRE - PHQ9
1. LITTLE INTEREST OR PLEASURE IN DOING THINGS: NOT AT ALL
SUM OF ALL RESPONSES TO PHQ9 QUESTIONS 1 AND 2: 0
2. FEELING DOWN, DEPRESSED OR HOPELESS: NOT AT ALL

## 2024-03-05 ASSESSMENT — PAIN SCALES - GENERAL
PAINLEVEL: 0-NO PAIN
PAINLEVEL_OUTOF10: 0 - NO PAIN

## 2024-03-06 RX ORDER — MELOXICAM 7.5 MG/1
7.5 TABLET ORAL DAILY
Qty: 90 TABLET | Refills: 3 | Status: SHIPPED | OUTPATIENT
Start: 2024-03-06 | End: 2025-03-06

## 2024-03-20 DIAGNOSIS — E78.2 MIXED HYPERLIPIDEMIA: ICD-10-CM

## 2024-03-20 RX ORDER — ATORVASTATIN CALCIUM 40 MG/1
40 TABLET, FILM COATED ORAL DAILY
Qty: 90 TABLET | Refills: 3 | Status: SHIPPED | OUTPATIENT
Start: 2024-03-20

## 2024-04-11 ENCOUNTER — OFFICE VISIT (OUTPATIENT)
Dept: PRIMARY CARE | Facility: CLINIC | Age: 69
End: 2024-04-11
Payer: MEDICARE

## 2024-04-11 VITALS
BODY MASS INDEX: 30.35 KG/M2 | HEIGHT: 70 IN | OXYGEN SATURATION: 94 % | HEART RATE: 84 BPM | TEMPERATURE: 97.3 F | SYSTOLIC BLOOD PRESSURE: 140 MMHG | WEIGHT: 212 LBS | DIASTOLIC BLOOD PRESSURE: 90 MMHG

## 2024-04-11 DIAGNOSIS — L24.9 IRRITANT CONTACT DERMATITIS, UNSPECIFIED TRIGGER: Primary | ICD-10-CM

## 2024-04-11 PROCEDURE — 1125F AMNT PAIN NOTED PAIN PRSNT: CPT | Performed by: NURSE PRACTITIONER

## 2024-04-11 PROCEDURE — 1159F MED LIST DOCD IN RCRD: CPT | Performed by: NURSE PRACTITIONER

## 2024-04-11 PROCEDURE — 3077F SYST BP >= 140 MM HG: CPT | Performed by: NURSE PRACTITIONER

## 2024-04-11 PROCEDURE — 3080F DIAST BP >= 90 MM HG: CPT | Performed by: NURSE PRACTITIONER

## 2024-04-11 PROCEDURE — 99213 OFFICE O/P EST LOW 20 MIN: CPT | Performed by: NURSE PRACTITIONER

## 2024-04-11 PROCEDURE — 1160F RVW MEDS BY RX/DR IN RCRD: CPT | Performed by: NURSE PRACTITIONER

## 2024-04-11 RX ORDER — TRIAMCINOLONE ACETONIDE 1 MG/G
CREAM TOPICAL 2 TIMES DAILY
Qty: 15 G | Refills: 1 | Status: SHIPPED | OUTPATIENT
Start: 2024-04-11

## 2024-04-11 ASSESSMENT — ENCOUNTER SYMPTOMS
VOMITING: 0
DIAPHORESIS: 0
WOUND: 0
FEVER: 0
CHILLS: 0
FATIGUE: 0
NAUSEA: 0

## 2024-04-11 ASSESSMENT — PAIN SCALES - GENERAL: PAINLEVEL: 2

## 2024-04-11 ASSESSMENT — PATIENT HEALTH QUESTIONNAIRE - PHQ9
1. LITTLE INTEREST OR PLEASURE IN DOING THINGS: NOT AT ALL
2. FEELING DOWN, DEPRESSED OR HOPELESS: NOT AT ALL
SUM OF ALL RESPONSES TO PHQ9 QUESTIONS 1 AND 2: 0

## 2024-04-11 NOTE — PROGRESS NOTES
"[facility]: [facility]  PROGRESS NOTE      Contreras De La Cruz is a 69 y.o. male that is presenting today for Follow-up (Rash on right leg).    Assessment/Plan   Diagnoses and all orders for this visit:    Irritant contact dermatitis, unspecified trigger  -     Start triamcinolone (Kenalog) 0.1 % cream; Apply topically 2 times a day. Apply to affected area 1-2 times daily as needed.    Subjective   Subjective  Contreras De La Cruz is a 69 y.o. male who presents for evaluation of a rash involving the right medial calf. Rash started 1 month ago. Lesions are red, and raised in texture. Rash has changed over time. Rash is pruritic. Associated symptoms: none. Patient denies: fever, nausea, and vomiting. Patient has not had contacts with similar rash. Patient has not had new exposures (soaps, lotions, laundry detergents, foods, medications, plants, insects or animals).    Objective  /90 (BP Location: Left arm, Patient Position: Sitting, BP Cuff Size: Adult)   Pulse 84   Temp 36.3 °C (97.3 °F)   Ht 1.778 m (5' 10\")   Wt 96.2 kg (212 lb)   SpO2 94%   BMI 30.42 kg/m²   General:  alert and oriented, in no acute distress  Skin:  see PE    Assessment/Plan  contact dermatitis: unknown agent    Medications: Triamcinolone cream  Verbal patient instruction given.  Follow up if no improvement or worsening of symptoms        Review of Systems   Constitutional:  Negative for chills, diaphoresis, fatigue and fever.   Gastrointestinal:  Negative for nausea and vomiting.   Skin:  Positive for rash (RLE). Negative for wound.      Objective   Vitals:    04/11/24 1421   BP: 140/90   Pulse: 84   Temp: 36.3 °C (97.3 °F)   SpO2: 94%      Body mass index is 30.42 kg/m².  Physical Exam  Constitutional:       General: He is not in acute distress.     Appearance: Normal appearance.   Cardiovascular:      Rate and Rhythm: Normal rate and regular rhythm.      Heart sounds: Normal heart sounds.   Pulmonary:      Effort: " "Pulmonary effort is normal.      Breath sounds: Normal breath sounds.   Musculoskeletal:        Legs:       Comments: Diffuse erythematous papular lesions dispersed in area approximately 4x3 in to medial calf, mild excoriation to papules from patient scratching without signs of drainage or bacterial infection.   Skin:     General: Skin is warm and dry.      Findings: Rash present.   Neurological:      General: No focal deficit present.      Mental Status: He is alert.   Psychiatric:         Mood and Affect: Mood normal.       Diagnostic Results   Lab Results   Component Value Date    GLUCOSE 103 (H) 06/20/2023    CALCIUM 9.7 06/20/2023     06/20/2023    K 4.8 06/20/2023    CO2 27 06/20/2023     06/20/2023    BUN 23 06/20/2023    CREATININE 1.0 06/20/2023     Lab Results   Component Value Date    ALT 26 06/20/2023    AST 20 06/20/2023    ALKPHOS 72 06/20/2023    BILITOT 0.4 06/20/2023     Lab Results   Component Value Date    WBC 8.7 06/20/2023    HGB 11.9 (L) 06/20/2023    HCT 40.5 (L) 06/20/2023    MCV 61.3 (L) 06/20/2023     06/20/2023     Lab Results   Component Value Date    CHOL 169 06/20/2023    CHOL 183 05/19/2022    CHOL 155 11/11/2021     Lab Results   Component Value Date    HDL 42 06/20/2023    HDL 48 05/19/2022    HDL 46 11/11/2021     Lab Results   Component Value Date    LDLCALC 104 06/20/2023    LDLCALC 115 05/19/2022    LDLCALC 93 11/11/2021     Lab Results   Component Value Date    TRIG 116 06/20/2023    TRIG 98 05/19/2022    TRIG 82 11/11/2021     No components found for: \"CHOLHDL\"  Lab Results   Component Value Date    HGBA1C 5.4 06/20/2023     Other labs not included in the list above were reviewed either before or during this encounter.    History    Past Medical History:   Diagnosis Date    Chronic pain     Hypertension      Past Surgical History:   Procedure Laterality Date    BACK SURGERY  1988    BACK SURGERY  1998    NECK SURGERY  2003     Family History   Problem " Relation Name Age of Onset    Heart failure Mother      Cancer Father       Social History     Socioeconomic History    Marital status:      Spouse name: Not on file    Number of children: Not on file    Years of education: Not on file    Highest education level: Not on file   Occupational History    Not on file   Tobacco Use    Smoking status: Former     Types: Cigarettes    Smokeless tobacco: Current     Types: Chew   Vaping Use    Vaping status: Never Used   Substance and Sexual Activity    Alcohol use: Not Currently    Drug use: Yes     Frequency: 7.0 times per week     Types: Marijuana     Comment: THC 10 mg dailt    Sexual activity: Not on file   Other Topics Concern    Not on file   Social History Narrative    Not on file     Social Determinants of Health     Financial Resource Strain: Not on file   Food Insecurity: Not on file   Transportation Needs: Not on file   Physical Activity: Not on file   Stress: Not on file   Social Connections: Not on file   Intimate Partner Violence: Not on file   Housing Stability: Not on file     Allergies   Allergen Reactions    Iodinated Contrast Media Other    Gabapentin Hives    Niacin-Lovastatin Hives     Current Outpatient Medications on File Prior to Visit   Medication Sig Dispense Refill    acetaminophen (Tylenol Extra Strength) 500 mg tablet Take 2 tablets (1,000 mg) by mouth every 8 hours if needed. Tylenol 8 Hour 500 mg      atorvastatin (Lipitor) 40 mg tablet Take 1 tablet (40 mg) by mouth once daily. 90 tablet 3    b complex 0.4 mg tablet Take 1 tablet by mouth once daily.      DULoxetine (Cymbalta) 60 mg DR capsule Take 1 capsule (60 mg) by mouth once daily.      esomeprazole (NexIUM) 20 mg DR capsule Take 1 capsule (20 mg) by mouth once daily.      glucosam/chondro/herb 149/hyal (GLUCOS CHOND CPLX ADVANCED ORAL) Take by mouth.      magnesium hydroxide (MAGNESIA ORAL) Take 500 mg by mouth.      meloxicam (Mobic) 7.5 mg tablet Take 1 tablet (7.5 mg) by mouth  once daily. 90 tablet 3    multivitamin tablet Take 1 tablet by mouth once daily.       No current facility-administered medications on file prior to visit.     Immunization History   Administered Date(s) Administered    Flu vaccine, quadrivalent, high-dose, preservative free, age 65y+ (FLUZONE) 11/17/2021, 11/29/2022, 01/16/2024    Influenza, Unspecified 11/29/2022    Influenza, injectable, quadrivalent 10/22/2018, 09/15/2020    Pfizer COVID-19 vaccine, Fall 2023, 12 years and older, (30mcg/0.3mL) 01/24/2024    Pfizer COVID-19 vaccine, bivalent, age 12 years and older (30 mcg/0.3 mL) 09/07/2022, 06/30/2023    Pfizer Gray Cap SARS-CoV-2 04/06/2022    Pfizer Purple Cap SARS-CoV-2 03/17/2021, 04/07/2021, 04/16/2021, 04/28/2021, 11/22/2021    Pneumococcal conjugate vaccine, 20-valent (PREVNAR 20) 06/13/2023    RSV, 60 Years And Older (AREXVY) 01/24/2024    Tdap vaccine, age 7 year and older (BOOSTRIX, ADACEL) 11/11/2015, 05/16/2016    Zoster vaccine, recombinant, adult (SHINGRIX) 06/29/2023, 08/27/2023    Zoster, live 05/16/2016     Patient's medical history was reviewed and updated either before or during this encounter.       Lakeisha Plata, APRN-CNP

## 2024-05-08 ENCOUNTER — TELEPHONE (OUTPATIENT)
Dept: PRIMARY CARE | Facility: CLINIC | Age: 69
End: 2024-05-08
Payer: MEDICARE

## 2024-05-08 PROBLEM — E78.5 HLD (HYPERLIPIDEMIA): Status: ACTIVE | Noted: 2023-09-03

## 2024-05-08 PROBLEM — M19.90 OA (OSTEOARTHRITIS): Status: ACTIVE | Noted: 2023-09-03

## 2024-05-08 NOTE — TELEPHONE ENCOUNTER
Pt called stating he has consistent pain in RT knee. Has been getting injections but does not want to continue with these because it is only a temporary fix. Please advise with what his next step should be

## 2024-05-08 NOTE — TELEPHONE ENCOUNTER
Looking at the medications that he is on, the first step would be to increase the dosing on the meloxicam from 7.5mg once/day to 15mg once/day (for now, he can start taking two pills / day instead of one/day).     If he thinks that this is helping, then I can prescribe him 15mg tablets of the meloxicam long-term.    The only other options out there are physical therapy (which I do think that he should do) or consider knee replacement surgery.

## 2024-05-17 ENCOUNTER — OFFICE VISIT (OUTPATIENT)
Dept: PAIN MEDICINE | Facility: CLINIC | Age: 69
End: 2024-05-17
Payer: MEDICARE

## 2024-05-17 VITALS
SYSTOLIC BLOOD PRESSURE: 145 MMHG | WEIGHT: 212 LBS | DIASTOLIC BLOOD PRESSURE: 93 MMHG | BODY MASS INDEX: 30.35 KG/M2 | OXYGEN SATURATION: 97 % | HEART RATE: 72 BPM | HEIGHT: 70 IN

## 2024-05-17 DIAGNOSIS — M25.561 CHRONIC PAIN OF RIGHT KNEE: ICD-10-CM

## 2024-05-17 DIAGNOSIS — M54.16 LUMBAR RADICULOPATHY: ICD-10-CM

## 2024-05-17 DIAGNOSIS — G89.29 CHRONIC PAIN OF RIGHT KNEE: ICD-10-CM

## 2024-05-17 DIAGNOSIS — M17.11 PRIMARY OSTEOARTHRITIS OF RIGHT KNEE: Primary | ICD-10-CM

## 2024-05-17 DIAGNOSIS — M54.51 VERTEBROGENIC LOW BACK PAIN: ICD-10-CM

## 2024-05-17 DIAGNOSIS — M47.818 ARTHROPATHY OF RIGHT SACROILIAC JOINT: ICD-10-CM

## 2024-05-17 PROCEDURE — 1159F MED LIST DOCD IN RCRD: CPT | Performed by: STUDENT IN AN ORGANIZED HEALTH CARE EDUCATION/TRAINING PROGRAM

## 2024-05-17 PROCEDURE — 99214 OFFICE O/P EST MOD 30 MIN: CPT | Performed by: STUDENT IN AN ORGANIZED HEALTH CARE EDUCATION/TRAINING PROGRAM

## 2024-05-17 PROCEDURE — 1125F AMNT PAIN NOTED PAIN PRSNT: CPT | Performed by: STUDENT IN AN ORGANIZED HEALTH CARE EDUCATION/TRAINING PROGRAM

## 2024-05-17 PROCEDURE — 3077F SYST BP >= 140 MM HG: CPT | Performed by: STUDENT IN AN ORGANIZED HEALTH CARE EDUCATION/TRAINING PROGRAM

## 2024-05-17 PROCEDURE — 1160F RVW MEDS BY RX/DR IN RCRD: CPT | Performed by: STUDENT IN AN ORGANIZED HEALTH CARE EDUCATION/TRAINING PROGRAM

## 2024-05-17 PROCEDURE — 3080F DIAST BP >= 90 MM HG: CPT | Performed by: STUDENT IN AN ORGANIZED HEALTH CARE EDUCATION/TRAINING PROGRAM

## 2024-05-17 ASSESSMENT — PAIN SCALES - GENERAL
PAINLEVEL: 1
PAINLEVEL_OUTOF10: 1

## 2024-05-17 ASSESSMENT — PAIN - FUNCTIONAL ASSESSMENT: PAIN_FUNCTIONAL_ASSESSMENT: 0-10

## 2024-05-17 ASSESSMENT — PAIN DESCRIPTION - DESCRIPTORS: DESCRIPTORS: SHARP

## 2024-05-17 NOTE — PROGRESS NOTES
[facility] Pain Management  Follow Up Office Visit Note 2024    Patient Information: Contreras De La Cruz, MRN: [id number], : 1955   Primary Care/Referring Physician: Bryan Esparza MD, [address] [place] [address] / [place] [address]     Chief Complaint: Low back and right leg pain  Interval History: He returns for follow up regarding his right knee pain.    Today he reports worsening right knee pain since last seen. He states this pain worsens when he is more active and sometimes is worse at night. He feels this along both the medial and lateral aspects of his knee joint. He has gotten knee joint steroid injections in the past with benefit, but it has been around 2 years since his last injection    His right buttock pain has started to slightly worsen but he does not feel additional SI joint injection is needed at this time    Brief History of Pain: Mr. Contreras De La Cruz is a 69 y.o. male with a PMHx of  HTN, HLD, GERD, tobacco use disorder who presents for evaluation of mid/low back, right leg, and tailbone pain.    For reference, he reports pain ongoing for many years, with recent exacerbation of low back and right leg pain. There was no obvious inciting event that caused this worsening pain. This pain worsens primarily with bending forward and lifting. He previously had an SCS in place for 10-15 years but had this removed in 2022 because he did not feel it was providing pain relief any longer. He was previously followed by Dr. Mcneal at [facility] for some time who was doing injections with benefit. Most recently he was followed by Dr. Herrera, who performed lumbar medial branch blocks with benefit, but the patient did not want to continue receiving injections since no sedation was given    Current Pain Medications: Meloxicam 7.5 mg daily, uses THC gummies, Duloxetine 60 mg daily  Previously Tried Pain Medications: Tylenol - no benefit. He reports trying a number of medications for pain in the past but is not  interested in restarting any of them    Relevant Surgeries: Hx of lumbar spine surgery x2 (last in 1990's) as well as cervical spine surgery (2003). Intracept at L4, L5, S1 - 80% pain relief.   Injections: Caudal LIZZETTE - 80% pain relief. Right SI joint injection - 100% pain relief. Knee steroid injections with minimal relief. Lumbar mbb's with only 30% relief. Reportedly had multiple other injections with Dr. Mcneal at [facility]   Physical/Occupational Therapy: Has done PT in the past but not recently    Medications:   Current Outpatient Medications   Medication Instructions    acetaminophen (Tylenol Extra Strength) 500 mg tablet 2 tablets, oral, Every 8 hours PRN, Tylenol 8 Hour 500 mg<BR>    atorvastatin (LIPITOR) 40 mg, oral, Daily    b complex 0.4 mg tablet 1 tablet, oral, Daily    DULoxetine (CYMBALTA) 60 mg, oral, Daily    esomeprazole (NEXIUM) 20 mg, oral, Daily    glucosam/chondro/herb 149/hyal (GLUCOS CHOND CPLX ADVANCED ORAL) oral    magnesium hydroxide (MAGNESIA ORAL) 500 mg, oral    meloxicam (MOBIC) 7.5 mg, oral, Daily    multivitamin tablet 1 tablet, oral, Daily    triamcinolone (Kenalog) 0.1 % cream Topical, 2 times daily, Apply to affected area 1-2 times daily as needed.      Allergies:   Allergies   Allergen Reactions    Iodinated Contrast Media Other    Gabapentin Hives    Niacin-Lovastatin Hives       Past Medical & Surgical History:  Past Medical History:   Diagnosis Date    Chronic pain     Hypertension       Past Surgical History:   Procedure Laterality Date    BACK SURGERY  1988    BACK SURGERY  1998    NECK SURGERY  2003       Family History   Problem Relation Name Age of Onset    Heart failure Mother      Cancer Father       Social History     Socioeconomic History    Marital status:      Spouse name: Not on file    Number of children: Not on file    Years of education: Not on file    Highest education level: Not on file   Occupational History    Not on file   Tobacco Use    Smoking status:  "Former     Types: Cigarettes    Smokeless tobacco: Current     Types: Chew   Vaping Use    Vaping status: Never Used   Substance and Sexual Activity    Alcohol use: Not Currently    Drug use: Yes     Frequency: 7.0 times per week     Types: Marijuana     Comment: THC 10 mg dailt    Sexual activity: Not on file   Other Topics Concern    Not on file   Social History Narrative    Not on file     Social Determinants of Health     Financial Resource Strain: Not on file   Food Insecurity: Not on file   Transportation Needs: Not on file   Physical Activity: Not on file   Stress: Not on file   Social Connections: Not on file   Intimate Partner Violence: Not on file   Housing Stability: Not on file       Problems, Past medical history, past surgical history, Medications, allergies, social and family history reviewed and as per the electronic medical record from today's encounter    Review of Systems:  CONST: No fever, chills, fatigue, weight changes  EYES: No loss of vision  ENT: No hearing loss, tinnitus  CV: No chest pain, palpitations  RESP: No dyspnea, shortness of breath, cough  GI: No stool incontinence, nausea, vomiting  : No urinary incontinence  MSK: No joint swelling  SKIN: No rash, no hives  NEURO: No headache, dizziness  PSYCH: No anxiety, depression  HEM/LYMPH: No easy bruising or bleeding  All other systems reviewed are negative     Physical Exam:  Vitals: BP (!) 145/93   Pulse 72   Ht 1.778 m (5' 10\")   Wt 96.2 kg (212 lb)   SpO2 97%   BMI 30.42 kg/m²   General: No apparent distress. Alert, appropriate, oriented x 3. Mood generally positive, affect congruent. Speaking in full sentences.   HENT: Normocephalic, atraumatic. Hearing intact.  Eyes: Pupils equal and round  Neck: Supple, trachea midline  Lungs: Symmetric respiratory excursion on visual exam, nonlabored breathing.   Extremities: No cyanosis or edema noted in extremities.  Skin: No rashes, lesions noted.  MSK: Right knee crepitus noted with " "passive ROM. No significant pain to palpation of knee joint line  Neuro: Alert and appropriate. Gait within normal limits. Bulk and tone within normal limits.    Laboratory Data:  The following laboratory data were reviewed during this visit:   Lab Results   Component Value Date    WBC 8.7 06/20/2023    RBC 6.61 (H) 06/20/2023    HGB 11.9 (L) 06/20/2023    HCT 40.5 (L) 06/20/2023     06/20/2023      No results found for: \"INR\"  Lab Results   Component Value Date    CREATININE 1.0 06/20/2023    HGBA1C 5.4 06/20/2023       Imaging:  The following imaging impressions were reviewed by me during this visit:    - 8/17/23 lumbar spine MRI shows L4/5 moderate posterior disc osteophyte complex, postsurgical changes about the right rosalia lamina, moderate right foraminal narrowing. L5/S1 asymmetric left paracentral and foraminal disc bulge likely abutting the descedning left S1 nerve root, postsurgical changes status post left hemilaminectomy. Post gadolinium imaging demonstrates mild left lateral and posterolateral epidural enhancement. No nerve root enhancement demonstrated. Modic changes noted at L5 and S1, and more subtly at L4.  -5/1/23 bilateral knee xray shows moderate OA  -8/11/23 thoracic spine CT shows T10-T11 right foraminal disc protrusion causing moderate right and no left foraminal stenosis or significant canal stenosis.    I also personally reviewed the images from the above studies myself. These images and my interpretation of them contributed to the management and decision making of the patient's medical plan.    ASSESSMENT:  Mr. Contreras De La Cruz is a 69 y.o. male with low back and right leg pain that is consistent with:    1. Primary osteoarthritis of right knee    2. Chronic pain of right knee    3. Arthropathy of right sacroiliac joint    4. Vertebrogenic low back pain    5. Lumbar radiculopathy          PLAN:  Radiology: -I reviewed his most recent bilateral knee xray which shows moderate OA in the right " knee. No new diagnostics at this time    Physically: Has done PT in the past without durable pain relief. Should maintain a regular HEP    Psychologically: No needs at this time    Medication: No changes to his medications today. He reports undergoing numerous medication trials in the past and is not interested in adding any new medications    Duration: Multiple years    Intervention: I suspect his low back/buttock pain is secondary to right sacroiliac joint arthropathy, lumbar radiculopathy and vertebrogenic low back pain. He has a fairly complex history of multiple back surgeries, multiple injections for pain, as well as SCS. He underwent Intracept at L4, L5, and S1 with 80% improvement in his axial back pain  - He has right leg pain that is likely radicular in nature. I performed a caudal LIZZETTE with 80% improvement in his right leg pain.   - He continues to have right buttock and groin pain which is most consistent with right sacroiliac joint arthropathy. He underwent a right SI joint injection with 100% pain relief. Will monitor for duration of pain relief and can consider repeating vs. SI joint fusion in the future  - His right knee pain is worsening. He has known moderate osteoarthritis that is likely causing this pain. He has undergone right knee joint steroid injections with significant pain relief in the past, and it has been 2 years since his last injection. I recommend proceeding with a right knee joint injection at his next office visit.         Sincerely,  Bartolome Huber MD  [facility] - [place]

## 2024-05-23 ENCOUNTER — OFFICE VISIT (OUTPATIENT)
Dept: PAIN MEDICINE | Facility: CLINIC | Age: 69
End: 2024-05-23
Payer: MEDICARE

## 2024-05-23 VITALS
OXYGEN SATURATION: 97 % | SYSTOLIC BLOOD PRESSURE: 124 MMHG | HEART RATE: 71 BPM | RESPIRATION RATE: 18 BRPM | WEIGHT: 200 LBS | BODY MASS INDEX: 28.63 KG/M2 | HEIGHT: 70 IN | DIASTOLIC BLOOD PRESSURE: 74 MMHG

## 2024-05-23 DIAGNOSIS — G89.29 CHRONIC PAIN OF RIGHT KNEE: ICD-10-CM

## 2024-05-23 DIAGNOSIS — M17.11 PRIMARY OSTEOARTHRITIS OF RIGHT KNEE: ICD-10-CM

## 2024-05-23 DIAGNOSIS — M25.561 CHRONIC PAIN OF RIGHT KNEE: ICD-10-CM

## 2024-05-23 PROCEDURE — 1125F AMNT PAIN NOTED PAIN PRSNT: CPT | Performed by: STUDENT IN AN ORGANIZED HEALTH CARE EDUCATION/TRAINING PROGRAM

## 2024-05-23 PROCEDURE — 20610 DRAIN/INJ JOINT/BURSA W/O US: CPT | Performed by: STUDENT IN AN ORGANIZED HEALTH CARE EDUCATION/TRAINING PROGRAM

## 2024-05-23 PROCEDURE — 2500000005 HC RX 250 GENERAL PHARMACY W/O HCPCS: Performed by: STUDENT IN AN ORGANIZED HEALTH CARE EDUCATION/TRAINING PROGRAM

## 2024-05-23 PROCEDURE — 3074F SYST BP LT 130 MM HG: CPT | Performed by: STUDENT IN AN ORGANIZED HEALTH CARE EDUCATION/TRAINING PROGRAM

## 2024-05-23 PROCEDURE — 1159F MED LIST DOCD IN RCRD: CPT | Performed by: STUDENT IN AN ORGANIZED HEALTH CARE EDUCATION/TRAINING PROGRAM

## 2024-05-23 PROCEDURE — 3078F DIAST BP <80 MM HG: CPT | Performed by: STUDENT IN AN ORGANIZED HEALTH CARE EDUCATION/TRAINING PROGRAM

## 2024-05-23 PROCEDURE — 2500000004 HC RX 250 GENERAL PHARMACY W/ HCPCS (ALT 636 FOR OP/ED): Performed by: STUDENT IN AN ORGANIZED HEALTH CARE EDUCATION/TRAINING PROGRAM

## 2024-05-23 PROCEDURE — 1160F RVW MEDS BY RX/DR IN RCRD: CPT | Performed by: STUDENT IN AN ORGANIZED HEALTH CARE EDUCATION/TRAINING PROGRAM

## 2024-05-23 RX ORDER — LIDOCAINE HYDROCHLORIDE 10 MG/ML
5 INJECTION, SOLUTION EPIDURAL; INFILTRATION; INTRACAUDAL; PERINEURAL ONCE
Status: COMPLETED | OUTPATIENT
Start: 2024-05-23 | End: 2024-05-23

## 2024-05-23 RX ORDER — TRIAMCINOLONE ACETONIDE 40 MG/ML
40 INJECTION, SUSPENSION INTRA-ARTICULAR; INTRAMUSCULAR ONCE
Status: COMPLETED | OUTPATIENT
Start: 2024-05-23 | End: 2024-05-23

## 2024-05-23 RX ADMIN — LIDOCAINE HYDROCHLORIDE 50 MG: 10 SOLUTION INTRAVENOUS at 11:42

## 2024-05-23 RX ADMIN — TRIAMCINOLONE ACETONIDE 40 MG: 40 INJECTION, SUSPENSION INTRA-ARTICULAR; INTRAMUSCULAR at 11:43

## 2024-05-23 ASSESSMENT — PAIN - FUNCTIONAL ASSESSMENT: PAIN_FUNCTIONAL_ASSESSMENT: 0-10

## 2024-05-23 ASSESSMENT — PAIN SCALES - GENERAL
PAINLEVEL: 4
PAINLEVEL_OUTOF10: 4

## 2024-05-23 ASSESSMENT — PAIN DESCRIPTION - DESCRIPTORS: DESCRIPTORS: SHARP

## 2024-05-23 NOTE — PROGRESS NOTES
[facility] Pain Management  Follow Up Office Visit Note 2024    Patient Information: Contreras De La Cruz, MRN: [id number], : 1955   Primary Care/Referring Physician: Bryan Esparza MD, [address] [place] [address] / [place] [address]     Chief Complaint: Low back and right leg pain  Interval History: He returns for right knee injection    Today he reports no changes since last seen    For reference, he reports worsening right knee pain since last seen. He states this pain worsens when he is more active and sometimes is worse at night. He feels this along both the medial and lateral aspects of his knee joint. He has gotten knee joint steroid injections in the past with benefit, but it has been around 2 years since his last injection. His right buttock pain has started to slightly worsen but he does not feel additional SI joint injection is needed at this time    Brief History of Pain: Mr. Contreras De La Cruz is a 69 y.o. male with a PMHx of  HTN, HLD, GERD, tobacco use disorder who presents for evaluation of mid/low back, right leg, and tailbone pain.    For reference, he reports pain ongoing for many years, with recent exacerbation of low back and right leg pain. There was no obvious inciting event that caused this worsening pain. This pain worsens primarily with bending forward and lifting. He previously had an SCS in place for 10-15 years but had this removed in 2022 because he did not feel it was providing pain relief any longer. He was previously followed by Dr. Mcneal at [facility] for some time who was doing injections with benefit. Most recently he was followed by Dr. Herrera, who performed lumbar medial branch blocks with benefit, but the patient did not want to continue receiving injections since no sedation was given    Current Pain Medications: Meloxicam 7.5 mg daily, uses THC gummies, Duloxetine 60 mg daily  Previously Tried Pain Medications: Tylenol - no benefit. He reports trying a number of medications for  pain in the past but is not interested in restarting any of them    Relevant Surgeries: Hx of lumbar spine surgery x2 (last in 1990's) as well as cervical spine surgery (2003). Intracept at L4, L5, S1 - 80% pain relief.   Injections: Caudal LIZZETTE - 80% pain relief. Right SI joint injection - 100% pain relief. Knee steroid injections with minimal relief. Lumbar mbb's with only 30% relief. Reportedly had multiple other injections with Dr. Mcneal at [facility]   Physical/Occupational Therapy: Has done PT in the past but not recently    Medications:   Current Outpatient Medications   Medication Instructions   • acetaminophen (Tylenol Extra Strength) 500 mg tablet 2 tablets, oral, Every 8 hours PRN, Tylenol 8 Hour 500 mg<BR>   • atorvastatin (LIPITOR) 40 mg, oral, Daily   • b complex 0.4 mg tablet 1 tablet, oral, Daily   • DULoxetine (CYMBALTA) 60 mg, oral, Daily   • esomeprazole (NEXIUM) 20 mg, oral, Daily   • glucosam/chondro/herb 149/hyal (GLUCOS CHOND CPLX ADVANCED ORAL) oral   • magnesium hydroxide (MAGNESIA ORAL) 500 mg, oral   • meloxicam (MOBIC) 7.5 mg, oral, Daily   • multivitamin tablet 1 tablet, oral, Daily   • triamcinolone (Kenalog) 0.1 % cream Topical, 2 times daily, Apply to affected area 1-2 times daily as needed.      Allergies:   Allergies   Allergen Reactions   • Iodinated Contrast Media Other   • Gabapentin Hives   • Niacin-Lovastatin Hives       Past Medical & Surgical History:  Past Medical History:   Diagnosis Date   • Chronic pain    • Hypertension       Past Surgical History:   Procedure Laterality Date   • BACK SURGERY  1988   • BACK SURGERY  1998   • NECK SURGERY  2003       Family History   Problem Relation Name Age of Onset   • Heart failure Mother     • Cancer Father       Social History     Socioeconomic History   • Marital status:      Spouse name: Not on file   • Number of children: Not on file   • Years of education: Not on file   • Highest education level: Not on file   Occupational  "History   • Not on file   Tobacco Use   • Smoking status: Former     Types: Cigarettes   • Smokeless tobacco: Current     Types: Chew   Vaping Use   • Vaping status: Never Used   Substance and Sexual Activity   • Alcohol use: Not Currently   • Drug use: Yes     Frequency: 7.0 times per week     Types: Marijuana     Comment: THC 10 mg dailt   • Sexual activity: Not on file   Other Topics Concern   • Not on file   Social History Narrative   • Not on file     Social Determinants of Health     Financial Resource Strain: Not on file   Food Insecurity: Not on file   Transportation Needs: Not on file   Physical Activity: Not on file   Stress: Not on file   Social Connections: Not on file   Intimate Partner Violence: Not on file   Housing Stability: Not on file       Problems, Past medical history, past surgical history, Medications, allergies, social and family history reviewed and as per the electronic medical record from today's encounter    Review of Systems:  Not performed today     Physical Exam:  Vitals: /74   Pulse 71   Resp 18   Ht 1.778 m (5' 10\")   Wt 90.7 kg (200 lb)   SpO2 97%   BMI 28.70 kg/m²   General: No apparent distress. Alert, appropriate, oriented x 3. Mood generally positive, affect congruent. Speaking in full sentences.   HENT: Normocephalic, atraumatic. Hearing intact.  Eyes: Pupils equal and round  Neck: Supple, trachea midline  Lungs: Symmetric respiratory excursion on visual exam, nonlabored breathing.   Extremities: No cyanosis or edema noted in extremities.  Skin: No rashes, lesions noted.  MSK: Right knee crepitus noted with passive ROM. No significant pain to palpation of knee joint line  Neuro: Alert and appropriate. Gait within normal limits. Bulk and tone within normal limits.    Laboratory Data:  The following laboratory data were reviewed during this visit:   Lab Results   Component Value Date    WBC 8.7 06/20/2023    RBC 6.61 (H) 06/20/2023    HGB 11.9 (L) 06/20/2023    HCT " "40.5 (L) 06/20/2023     06/20/2023      No results found for: \"INR\"  Lab Results   Component Value Date    CREATININE 1.0 06/20/2023    HGBA1C 5.4 06/20/2023       Imaging:  The following imaging impressions were reviewed by me during this visit:    - 8/17/23 lumbar spine MRI shows L4/5 moderate posterior disc osteophyte complex, postsurgical changes about the right rosalia lamina, moderate right foraminal narrowing. L5/S1 asymmetric left paracentral and foraminal disc bulge likely abutting the descedning left S1 nerve root, postsurgical changes status post left hemilaminectomy. Post gadolinium imaging demonstrates mild left lateral and posterolateral epidural enhancement. No nerve root enhancement demonstrated. Modic changes noted at L5 and S1, and more subtly at L4.  -5/1/23 bilateral knee xray shows moderate OA  -8/11/23 thoracic spine CT shows T10-T11 right foraminal disc protrusion causing moderate right and no left foraminal stenosis or significant canal stenosis.    I also personally reviewed the images from the above studies myself. These images and my interpretation of them contributed to the management and decision making of the patient's medical plan.    ASSESSMENT:  Mr. Contreras De La Cruz is a 69 y.o. male with low back and right leg pain that is consistent with:    1. Primary osteoarthritis of right knee    2. Chronic pain of right knee            PLAN:  Radiology: -I reviewed his most recent bilateral knee xray which shows moderate OA in the right knee. No new diagnostics at this time    Physically: Has done PT in the past without durable pain relief. Should maintain a regular HEP    Psychologically: No needs at this time    Medication: No changes to his medications today. He reports undergoing numerous medication trials in the past and is not interested in adding any new medications    Duration: Multiple years    Intervention: I suspect his low back/buttock pain is secondary to right sacroiliac joint " arthropathy, lumbar radiculopathy and vertebrogenic low back pain. He has a fairly complex history of multiple back surgeries, multiple injections for pain, as well as SCS. He underwent Intracept at L4, L5, and S1 with 80% improvement in his axial back pain  - He has right leg pain that is likely radicular in nature. I performed a caudal LIZZETTE with 80% improvement in his right leg pain.   - He continues to have right buttock and groin pain which is most consistent with right sacroiliac joint arthropathy. He underwent a right SI joint injection with 100% pain relief. Will monitor for duration of pain relief and can consider repeating vs. SI joint fusion in the future  - His right knee pain is worsening. He has known moderate osteoarthritis that is likely causing this pain. He has undergone right knee joint steroid injections with significant pain relief in the past, and it has been 2 years since his last injection. This was repeated today, as noted below    Joint Injection/Aspiration    Date/Time: 5/23/2024 8:59 AM    Performed by: Bartolome Huber MD  Authorized by: Bartolome Huber MD    Consent:     Consent obtained:  Written    Consent given by:  Patient    Risks, benefits, and alternatives were discussed: yes      Risks discussed:  Bleeding, infection and pain    Alternatives discussed:  Alternative treatment  Universal protocol:     Procedure explained and questions answered to patient or proxy's satisfaction: yes      Relevant documents present and verified: yes      Imaging studies available: yes      Site/side marked: yes      Immediately prior to procedure, a time out was called: yes      Patient identity confirmed:  Verbally with patient  Location:     Location:  Knee    Knee:  R knee  Anesthesia:     Anesthesia method:  Local infiltration    Local anesthetic:  Lidocaine 1% w/o epi  Procedure details:     Preparation: Patient was prepped and draped in usual sterile fashion      Needle gauge:  22 G     Ultrasound guidance: no      Approach:  Anterior    Steroid injected: yes    Post-procedure details:     Dressing:  Adhesive bandage    Procedure completion:  Tolerated well, no immediate complications  Comments:      Procedure Note: Right knee intra-articular steroid injection  The patient was positioned sitting with the legs hanging over the exam table. The joint space was palpated and marked approximately 1 cm superior to the tibal plateau and 1 cm lateral to the patellar tendon. This area was cleaned with EtOH and the skin was anesthesized with 1 mL of lidocaine 1% with a 25G 1.5 inch needle. Next, the knee was prepped with chlorhexidine and allowed to dry for 3 minutes. A 22 G 1.5 inch needle was then used in enter the joint space. After negative aspiration, a mixture of 40 mg triamcinolone diluted in 4 mL of lidocaine 1% was then injected, and the needle was then withdrawn. The patient tolerated the procedure well            Sincerely,  Bartolome Huber MD  [facility] - [place]

## 2024-07-23 ENCOUNTER — APPOINTMENT (OUTPATIENT)
Dept: PRIMARY CARE | Facility: CLINIC | Age: 69
End: 2024-07-23
Payer: MEDICARE

## 2024-07-26 ENCOUNTER — LAB (OUTPATIENT)
Dept: LAB | Facility: LAB | Age: 69
End: 2024-07-26
Payer: MEDICARE

## 2024-07-26 DIAGNOSIS — E55.9 VITAMIN D DEFICIENCY: ICD-10-CM

## 2024-07-26 DIAGNOSIS — E78.2 MIXED HYPERLIPIDEMIA: ICD-10-CM

## 2024-07-26 DIAGNOSIS — R73.9 HYPERGLYCEMIA: ICD-10-CM

## 2024-07-26 DIAGNOSIS — I10 ESSENTIAL HYPERTENSION: ICD-10-CM

## 2024-07-26 DIAGNOSIS — Z12.5 ENCOUNTER FOR PROSTATE CANCER SCREENING: ICD-10-CM

## 2024-07-26 DIAGNOSIS — Z01.89 ENCOUNTER FOR ROUTINE LABORATORY TESTING: ICD-10-CM

## 2024-07-26 LAB
25(OH)D3 SERPL-MCNC: 20 NG/ML (ref 31–100)
ALBUMIN SERPL-MCNC: 4.6 G/DL (ref 3.5–5)
ALP BLD-CCNC: 81 U/L (ref 35–125)
ALT SERPL-CCNC: 23 U/L (ref 5–40)
ANION GAP SERPL CALC-SCNC: 13 MMOL/L
AST SERPL-CCNC: 21 U/L (ref 5–40)
BASOPHILS # BLD AUTO: 0.1 X10*3/UL (ref 0–0.1)
BASOPHILS NFR BLD AUTO: 1.1 %
BILIRUB DIRECT SERPL-MCNC: <0.2 MG/DL (ref 0–0.2)
BILIRUB SERPL-MCNC: 0.6 MG/DL (ref 0.1–1.2)
BUN SERPL-MCNC: 10 MG/DL (ref 8–25)
CALCIUM SERPL-MCNC: 9.8 MG/DL (ref 8.5–10.4)
CHLORIDE SERPL-SCNC: 100 MMOL/L (ref 97–107)
CHOLEST SERPL-MCNC: 163 MG/DL (ref 133–200)
CHOLEST/HDLC SERPL: 4.2 {RATIO}
CO2 SERPL-SCNC: 27 MMOL/L (ref 24–31)
CREAT SERPL-MCNC: 0.9 MG/DL (ref 0.4–1.6)
EGFRCR SERPLBLD CKD-EPI 2021: >90 ML/MIN/1.73M*2
EOSINOPHIL # BLD AUTO: 0.31 X10*3/UL (ref 0–0.7)
EOSINOPHIL NFR BLD AUTO: 3.5 %
ERYTHROCYTE [DISTWIDTH] IN BLOOD BY AUTOMATED COUNT: 18.7 % (ref 11.5–14.5)
EST. AVERAGE GLUCOSE BLD GHB EST-MCNC: 114 MG/DL
GLUCOSE SERPL-MCNC: 114 MG/DL (ref 65–99)
HBA1C MFR BLD: 5.6 %
HCT VFR BLD AUTO: 42.2 % (ref 41–52)
HDLC SERPL-MCNC: 39 MG/DL
HGB BLD-MCNC: 12.6 G/DL (ref 13.5–17.5)
IMM GRANULOCYTES # BLD AUTO: 0.03 X10*3/UL (ref 0–0.7)
IMM GRANULOCYTES NFR BLD AUTO: 0.3 % (ref 0–0.9)
LDLC SERPL CALC-MCNC: 97 MG/DL (ref 65–130)
LYMPHOCYTES # BLD AUTO: 2.64 X10*3/UL (ref 1.2–4.8)
LYMPHOCYTES NFR BLD AUTO: 29.6 %
MCH RBC QN AUTO: 18.2 PG (ref 26–34)
MCHC RBC AUTO-ENTMCNC: 29.9 G/DL (ref 32–36)
MCV RBC AUTO: 61 FL (ref 80–100)
MONOCYTES # BLD AUTO: 0.69 X10*3/UL (ref 0.1–1)
MONOCYTES NFR BLD AUTO: 7.7 %
NEUTROPHILS # BLD AUTO: 5.14 X10*3/UL (ref 1.2–7.7)
NEUTROPHILS NFR BLD AUTO: 57.8 %
NRBC BLD-RTO: 0 /100 WBCS (ref 0–0)
PLATELET # BLD AUTO: 399 X10*3/UL (ref 150–450)
POTASSIUM SERPL-SCNC: 4.4 MMOL/L (ref 3.4–5.1)
PROT SERPL-MCNC: 6.7 G/DL (ref 5.9–7.9)
PSA SERPL-MCNC: 2 NG/ML
RBC # BLD AUTO: 6.93 X10*6/UL (ref 4.5–5.9)
SODIUM SERPL-SCNC: 140 MMOL/L (ref 133–145)
TRIGL SERPL-MCNC: 133 MG/DL (ref 40–150)
WBC # BLD AUTO: 8.9 X10*3/UL (ref 4.4–11.3)

## 2024-07-26 PROCEDURE — 84443 ASSAY THYROID STIM HORMONE: CPT

## 2024-07-26 PROCEDURE — 80053 COMPREHEN METABOLIC PANEL: CPT

## 2024-07-26 PROCEDURE — 83036 HEMOGLOBIN GLYCOSYLATED A1C: CPT

## 2024-07-26 PROCEDURE — 85025 COMPLETE CBC W/AUTO DIFF WBC: CPT

## 2024-07-26 PROCEDURE — G0103 PSA SCREENING: HCPCS

## 2024-07-26 PROCEDURE — 82248 BILIRUBIN DIRECT: CPT

## 2024-07-26 PROCEDURE — 80061 LIPID PANEL: CPT

## 2024-07-26 PROCEDURE — 83540 ASSAY OF IRON: CPT

## 2024-07-26 PROCEDURE — 83550 IRON BINDING TEST: CPT

## 2024-07-26 PROCEDURE — 82728 ASSAY OF FERRITIN: CPT

## 2024-07-26 PROCEDURE — 36415 COLL VENOUS BLD VENIPUNCTURE: CPT

## 2024-07-26 PROCEDURE — 82306 VITAMIN D 25 HYDROXY: CPT

## 2024-07-30 ENCOUNTER — OFFICE VISIT (OUTPATIENT)
Dept: PRIMARY CARE | Facility: CLINIC | Age: 69
End: 2024-07-30
Payer: MEDICARE

## 2024-07-30 VITALS
OXYGEN SATURATION: 97 % | HEIGHT: 70 IN | BODY MASS INDEX: 26.48 KG/M2 | DIASTOLIC BLOOD PRESSURE: 70 MMHG | SYSTOLIC BLOOD PRESSURE: 124 MMHG | HEART RATE: 71 BPM | TEMPERATURE: 93.7 F | WEIGHT: 185 LBS

## 2024-07-30 DIAGNOSIS — R53.82 CHRONIC FATIGUE: ICD-10-CM

## 2024-07-30 DIAGNOSIS — Z87.891 FORMER TOBACCO USE: ICD-10-CM

## 2024-07-30 DIAGNOSIS — Z12.12 ENCOUNTER FOR COLORECTAL CANCER SCREENING: ICD-10-CM

## 2024-07-30 DIAGNOSIS — Z71.89 COUNSELING REGARDING ADVANCED CARE PLANNING AND GOALS OF CARE: ICD-10-CM

## 2024-07-30 DIAGNOSIS — I10 PRIMARY HYPERTENSION: ICD-10-CM

## 2024-07-30 DIAGNOSIS — Z00.00 ANNUAL PHYSICAL EXAM: Primary | ICD-10-CM

## 2024-07-30 DIAGNOSIS — Z23 ENCOUNTER FOR IMMUNIZATION: ICD-10-CM

## 2024-07-30 DIAGNOSIS — D64.9 ANEMIA, UNSPECIFIED TYPE: ICD-10-CM

## 2024-07-30 DIAGNOSIS — Z01.89 ENCOUNTER FOR ROUTINE LABORATORY TESTING: ICD-10-CM

## 2024-07-30 DIAGNOSIS — E78.2 MIXED HYPERLIPIDEMIA: ICD-10-CM

## 2024-07-30 DIAGNOSIS — E55.9 VITAMIN D DEFICIENCY: ICD-10-CM

## 2024-07-30 DIAGNOSIS — M15.9 PRIMARY OSTEOARTHRITIS INVOLVING MULTIPLE JOINTS: ICD-10-CM

## 2024-07-30 DIAGNOSIS — R73.01 IFG (IMPAIRED FASTING GLUCOSE): ICD-10-CM

## 2024-07-30 DIAGNOSIS — Z12.11 ENCOUNTER FOR COLORECTAL CANCER SCREENING: ICD-10-CM

## 2024-07-30 DIAGNOSIS — Z12.5 ENCOUNTER FOR PROSTATE CANCER SCREENING: ICD-10-CM

## 2024-07-30 DIAGNOSIS — Z13.6 ENCOUNTER FOR ABDOMINAL AORTIC ANEURYSM (AAA) SCREENING: ICD-10-CM

## 2024-07-30 DIAGNOSIS — K21.9 GASTROESOPHAGEAL REFLUX DISEASE WITHOUT ESOPHAGITIS: ICD-10-CM

## 2024-07-30 DIAGNOSIS — G63 POLYNEUROPATHY ASSOCIATED WITH UNDERLYING DISEASE (MULTI): ICD-10-CM

## 2024-07-30 DIAGNOSIS — Z13.6 ENCOUNTER FOR SCREENING FOR CARDIOVASCULAR DISORDERS: ICD-10-CM

## 2024-07-30 LAB
FERRITIN SERPL-MCNC: 113 NG/ML (ref 30–400)
IRON SATN MFR SERPL: 26 % (ref 12–50)
IRON SERPL-MCNC: 65 UG/DL (ref 45–160)
TIBC SERPL-MCNC: 246 UG/DL (ref 228–428)
TSH SERPL DL<=0.05 MIU/L-ACNC: 2.45 MIU/L (ref 0.27–4.2)
UIBC SERPL-MCNC: 181 UG/DL (ref 110–370)

## 2024-07-30 PROCEDURE — 3074F SYST BP LT 130 MM HG: CPT | Performed by: STUDENT IN AN ORGANIZED HEALTH CARE EDUCATION/TRAINING PROGRAM

## 2024-07-30 PROCEDURE — 99215 OFFICE O/P EST HI 40 MIN: CPT | Performed by: STUDENT IN AN ORGANIZED HEALTH CARE EDUCATION/TRAINING PROGRAM

## 2024-07-30 PROCEDURE — 3008F BODY MASS INDEX DOCD: CPT | Performed by: STUDENT IN AN ORGANIZED HEALTH CARE EDUCATION/TRAINING PROGRAM

## 2024-07-30 PROCEDURE — 1160F RVW MEDS BY RX/DR IN RCRD: CPT | Performed by: STUDENT IN AN ORGANIZED HEALTH CARE EDUCATION/TRAINING PROGRAM

## 2024-07-30 PROCEDURE — G0439 PPPS, SUBSEQ VISIT: HCPCS | Performed by: STUDENT IN AN ORGANIZED HEALTH CARE EDUCATION/TRAINING PROGRAM

## 2024-07-30 PROCEDURE — 99214 OFFICE O/P EST MOD 30 MIN: CPT | Performed by: STUDENT IN AN ORGANIZED HEALTH CARE EDUCATION/TRAINING PROGRAM

## 2024-07-30 PROCEDURE — 1159F MED LIST DOCD IN RCRD: CPT | Performed by: STUDENT IN AN ORGANIZED HEALTH CARE EDUCATION/TRAINING PROGRAM

## 2024-07-30 PROCEDURE — 1126F AMNT PAIN NOTED NONE PRSNT: CPT | Performed by: STUDENT IN AN ORGANIZED HEALTH CARE EDUCATION/TRAINING PROGRAM

## 2024-07-30 PROCEDURE — 3078F DIAST BP <80 MM HG: CPT | Performed by: STUDENT IN AN ORGANIZED HEALTH CARE EDUCATION/TRAINING PROGRAM

## 2024-07-30 RX ORDER — MELOXICAM 7.5 MG/1
7.5 TABLET ORAL DAILY PRN
COMMUNITY
End: 2024-07-30 | Stop reason: ALTCHOICE

## 2024-07-30 RX ORDER — CELECOXIB 100 MG/1
100 CAPSULE ORAL 2 TIMES DAILY
Qty: 60 CAPSULE | Refills: 1 | Status: SHIPPED | OUTPATIENT
Start: 2024-07-30

## 2024-07-30 RX ORDER — ACETAMINOPHEN 500 MG
2000 TABLET ORAL DAILY
COMMUNITY
Start: 2024-07-30

## 2024-07-30 ASSESSMENT — PATIENT HEALTH QUESTIONNAIRE - PHQ9
SUM OF ALL RESPONSES TO PHQ9 QUESTIONS 1 AND 2: 0
1. LITTLE INTEREST OR PLEASURE IN DOING THINGS: NOT AT ALL
2. FEELING DOWN, DEPRESSED OR HOPELESS: NOT AT ALL

## 2024-07-30 ASSESSMENT — ENCOUNTER SYMPTOMS
EYES NEGATIVE: 1
PSYCHIATRIC NEGATIVE: 1
HEMATOLOGIC/LYMPHATIC NEGATIVE: 1
GASTROINTESTINAL NEGATIVE: 1
RESPIRATORY NEGATIVE: 1
LOSS OF SENSATION IN FEET: 0
CARDIOVASCULAR NEGATIVE: 1
ALLERGIC/IMMUNOLOGIC NEGATIVE: 1
NEUROLOGICAL NEGATIVE: 1
OCCASIONAL FEELINGS OF UNSTEADINESS: 0
ENDOCRINE NEGATIVE: 1
MUSCULOSKELETAL NEGATIVE: 1
CONSTITUTIONAL NEGATIVE: 1
DEPRESSION: 0

## 2024-07-30 ASSESSMENT — PAIN SCALES - GENERAL: PAINLEVEL: 0-NO PAIN

## 2024-07-30 NOTE — PATIENT INSTRUCTIONS
Discussed routine and/or preventative care with the patient as outlined below:  - Labwork:   - Patient had labwork done for this appointment. Discussed today.   - Vitamin D deficiency. Wrote down the dosage of OTC supplementations.  - Mild anemia. Patient noting some fatigue, I do not think that this anemia is accounting for that; however, patient does have significant microcytosis and elevated RDW, making me believe that he might have significant iron deficiency. Will add iron studies to the labs that he did before.  - Remainder of labwork looked fairly good.  - Will order labwork for the patient's next appointment. Encouraged the patient to get this done one week prior to the next appointment.  - Imaging:   - Colorectal Cancer: Refer to gastroenterology.  - Cardiovascular Imaging: I think that the patient is a good candidate for a coronary artery calcium score.  - Tobacco Use: Check abdominal ultrasound to check for aortic aneurysm.  - Immunizations:   - Patient does not appear to be due for routine immunizations at this time.   - Significant medication and problem list reconciliation done today. Discussed any opiate and/or controlled substance use with the patient.  - Blood pressure at goal today.  - Encouraged continued diet, exercise, and lifestyle modification.  - Patient discussing pain today. Notes that he is seeing pain management for joint injections with significant improvement in symptoms. Notes that he can only use the meloxicam intermittently due to significant GI discomfort. Will stop this today and trial celecoxib. Will start low-dose for the patient and have him reach out in about a month to let us know how this is working. Also discussed with the patient that there is no literature supporting the use of glucosamine-chondroitin for the use of osteoarthritic pain.  - Patient also noting chronic fatigue. Labs reviewed, I don't think that this is related to anemia; however, as mentioned before, will  pursue iron studies. Will also add-on a thyroid check to the labwork just done. Will check testosterone and cortisol for next visit as well.    ADVANCED CARE PLANNING  Advanced Care Planning was discussed with patient.  Encouraged the patient to confirm that Living Will and Healthcare Power of  (HCPoA) are accurate and up to date.  Encouraged the patient to confirm that our office be provided a copy of any documentation in the event that anything changes.

## 2024-07-30 NOTE — PROGRESS NOTES
[facility]: [facility]  MEDICARE WELLNESS EXAM      Contreras De La Cruz is a 69 y.o. male that is presenting today for Annual Exam.    Assessment/Plan    Discussed routine and/or preventative care with the patient as outlined below:  - Labwork:   - Patient had labwork done for this appointment. Discussed today.   - Vitamin D deficiency. Wrote down the dosage of OTC supplementations.  - Mild anemia. Patient noting some fatigue, I do not think that this anemia is accounting for that; however, patient does have significant microcytosis and elevated RDW, making me believe that he might have significant iron deficiency. Will add iron studies to the labs that he did before.  - Remainder of labwork looked fairly good.  - Will order labwork for the patient's next appointment. Encouraged the patient to get this done one week prior to the next appointment.  - Imaging:   - Colorectal Cancer: Refer to gastroenterology.  - Cardiovascular Imaging: I think that the patient is a good candidate for a coronary artery calcium score.  - Tobacco Use: Check abdominal ultrasound to check for aortic aneurysm.  - Immunizations:   - Patient does not appear to be due for routine immunizations at this time.   - Significant medication and problem list reconciliation done today. Discussed any opiate and/or controlled substance use with the patient.  - Blood pressure at goal today.  - Encouraged continued diet, exercise, and lifestyle modification.  - Patient discussing pain today. Notes that he is seeing pain management for joint injections with significant improvement in symptoms. Notes that he can only use the meloxicam intermittently due to significant GI discomfort. Will stop this today and trial celecoxib. Will start low-dose for the patient and have him reach out in about a month to let us know how this is working. Also discussed with the patient that there is no literature supporting the use of glucosamine-chondroitin  for the use of osteoarthritic pain.  - Patient also noting chronic fatigue. Labs reviewed, I don't think that this is related to anemia; however, as mentioned before, will pursue iron studies. Will also add-on a thyroid check to the labwork just done. Will check testosterone and cortisol for next visit as well.    ADVANCED CARE PLANNING  Advanced Care Planning was discussed with patient.  Encouraged the patient to confirm that Living Will and Healthcare Power of  (HCPoA) are accurate and up to date.  Encouraged the patient to confirm that our office be provided a copy of any documentation in the event that anything changes.    Current Outpatient Medications   Medication Instructions    acetaminophen (Tylenol Extra Strength) 500 mg tablet 2 tablets, oral, Every 8 hours PRN, Tylenol 8 Hour 500 mg<BR>    atorvastatin (LIPITOR) 40 mg, oral, Daily    celecoxib (CELEBREX) 100 mg, oral, 2 times daily    cholecalciferol (VITAMIN D3) 50 mcg, oral, Daily    DULoxetine (CYMBALTA) 60 mg, oral, Daily    esomeprazole (NEXIUM) 20 mg, oral, Daily    magnesium hydroxide (MAGNESIA ORAL) 500 mg, oral    multivitamin tablet 1 tablet, oral, Daily     Diagnoses and all orders for this visit:  Annual physical exam  -     Follow Up In Primary Care; Future  Counseling regarding advanced care planning and goals of care  Encounter for abdominal aortic aneurysm (AAA) screening  -     Vascular US abdominal aorta anuerysm AAA screening; Future  Encounter for screening for cardiovascular disorders  -     CT cardiac scoring wo IV contrast; Future  Encounter for colorectal cancer screening  -     Referral to Gastroenterology; Future  Encounter for routine laboratory testing  Encounter for prostate cancer screening  Encounter for immunization  Polyneuropathy associated with underlying disease (Multi)  Gastroesophageal reflux disease without esophagitis  Primary osteoarthritis involving multiple joints  -     Follow Up In Primary Care  -      celecoxib (CeleBREX) 100 mg capsule; Take 1 capsule (100 mg) by mouth 2 times a day.  IFG (impaired fasting glucose)  Anemia, unspecified type  -     Iron and TIBC; Future  -     Ferritin; Future  Mixed hyperlipidemia  -     Hepatic Function Panel; Future  -     CT cardiac scoring wo IV contrast; Future  Primary hypertension  -     CT cardiac scoring wo IV contrast; Future  Vitamin D deficiency  -     cholecalciferol (Vitamin D3) 50 mcg (2,000 unit) capsule; Take 1 capsule (50 mcg) by mouth once daily.  -     Vitamin D 25-Hydroxy,Total (for eval of Vitamin D levels); Future  Former tobacco use  -     Vascular US abdominal aorta anuerysm AAA screening; Future  -     CT cardiac scoring wo IV contrast; Future  Chronic fatigue  -     Testosterone,Free and Total; Future  -     Cortisol; Future  -     TSH with reflex to Free T4 if abnormal; Future  -     CBC and Auto Differential; Future  -     Basic Metabolic Panel; Future    Subjective   - The patient otherwise feels well and denies any acute symptoms or concerns at this time.  - The patient denies any changes or progression of their chronic medical problems.  - The patient denies any problems or concerns with their medications.      Review of Systems   Constitutional: Negative.    HENT: Negative.     Eyes: Negative.    Respiratory: Negative.     Cardiovascular: Negative.    Gastrointestinal: Negative.    Endocrine: Negative.    Genitourinary: Negative.    Musculoskeletal: Negative.    Skin: Negative.    Allergic/Immunologic: Negative.    Neurological: Negative.    Hematological: Negative.    Psychiatric/Behavioral: Negative.     All other systems reviewed and are negative.    Objective   Vitals:    07/30/24 1323   BP: 124/70   Pulse: 71   Temp: 34.3 °C (93.7 °F)   SpO2: 97%      Body mass index is 26.54 kg/m².  Physical Exam  Vitals and nursing note reviewed.   Constitutional:       General: He is not in acute distress.     Appearance: Normal appearance. He is not  ill-appearing.   HENT:      Head: Normocephalic and atraumatic.      Right Ear: Tympanic membrane, ear canal and external ear normal. There is no impacted cerumen.      Left Ear: Tympanic membrane, ear canal and external ear normal. There is no impacted cerumen.      Nose: Nose normal.      Mouth/Throat:      Mouth: Mucous membranes are moist.      Pharynx: Oropharynx is clear. No oropharyngeal exudate or posterior oropharyngeal erythema.   Eyes:      General: No scleral icterus.        Right eye: No discharge.         Left eye: No discharge.      Extraocular Movements: Extraocular movements intact.      Conjunctiva/sclera: Conjunctivae normal.      Pupils: Pupils are equal, round, and reactive to light.   Neck:      Vascular: No carotid bruit.   Cardiovascular:      Rate and Rhythm: Normal rate and regular rhythm.      Pulses: Normal pulses.      Heart sounds: Normal heart sounds. No murmur heard.     No friction rub. No gallop.   Pulmonary:      Effort: Pulmonary effort is normal. No respiratory distress.      Breath sounds: Normal breath sounds.   Abdominal:      General: Abdomen is flat. Bowel sounds are normal.      Palpations: Abdomen is soft.      Tenderness: There is no abdominal tenderness.      Hernia: No hernia is present.   Musculoskeletal:         General: No swelling. Normal range of motion.      Cervical back: Normal range of motion.   Lymphadenopathy:      Cervical: No cervical adenopathy.   Skin:     General: Skin is warm and dry.      Coloration: Skin is not jaundiced.      Findings: No rash.   Neurological:      General: No focal deficit present.      Mental Status: He is alert and oriented to person, place, and time. Mental status is at baseline.   Psychiatric:         Mood and Affect: Mood normal.         Behavior: Behavior normal.       Diagnostic Results   Lab Results   Component Value Date    GLUCOSE 114 (H) 07/26/2024    CALCIUM 9.8 07/26/2024     07/26/2024    K 4.4 07/26/2024    CO2  "27 07/26/2024     07/26/2024    BUN 10 07/26/2024    CREATININE 0.90 07/26/2024     Lab Results   Component Value Date    ALT 23 07/26/2024    AST 21 07/26/2024    ALKPHOS 81 07/26/2024    BILITOT 0.6 07/26/2024     Lab Results   Component Value Date    WBC 8.9 07/26/2024    HGB 12.6 (L) 07/26/2024    HCT 42.2 07/26/2024    MCV 61 (L) 07/26/2024     07/26/2024     Lab Results   Component Value Date    CHOL 163 07/26/2024    CHOL 169 06/20/2023    CHOL 183 05/19/2022     Lab Results   Component Value Date    HDL 39.0 (L) 07/26/2024    HDL 42 06/20/2023    HDL 48 05/19/2022     Lab Results   Component Value Date    LDLCALC 97 07/26/2024    LDLCALC 104 06/20/2023    LDLCALC 115 05/19/2022     Lab Results   Component Value Date    TRIG 133 07/26/2024    TRIG 116 06/20/2023    TRIG 98 05/19/2022     No components found for: \"CHOLHDL\"  Lab Results   Component Value Date    HGBA1C 5.6 07/26/2024     Other labs not included in the list above reviewed either before or during this encounter.    History   Past Medical History:   Diagnosis Date    Chronic pain     Hypertension      Past Surgical History:   Procedure Laterality Date    BACK SURGERY  1988    BACK SURGERY  1998    NECK SURGERY  2003     Family History   Problem Relation Name Age of Onset    Heart failure Mother      Cancer Father       Social History     Socioeconomic History    Marital status:      Spouse name: Not on file    Number of children: Not on file    Years of education: Not on file    Highest education level: Not on file   Occupational History    Not on file   Tobacco Use    Smoking status: Former     Types: Cigarettes    Smokeless tobacco: Current     Types: Chew   Vaping Use    Vaping status: Never Used   Substance and Sexual Activity    Alcohol use: Not Currently    Drug use: Yes     Frequency: 7.0 times per week     Types: Marijuana     Comment: THC 10 mg dailt    Sexual activity: Not on file   Other Topics Concern    Not on file "   Social History Narrative    Not on file     Social Determinants of Health     Financial Resource Strain: Not on file   Food Insecurity: Not on file   Transportation Needs: Not on file   Physical Activity: Not on file   Stress: Not on file   Social Connections: Not on file   Intimate Partner Violence: Not on file   Housing Stability: Not on file     Allergies   Allergen Reactions    Iodinated Contrast Media Other    Gabapentin Hives    Niacin-Lovastatin Hives     Current Outpatient Medications on File Prior to Visit   Medication Sig Dispense Refill    acetaminophen (Tylenol Extra Strength) 500 mg tablet Take 2 tablets (1,000 mg) by mouth every 8 hours if needed. Tylenol 8 Hour 500 mg      atorvastatin (Lipitor) 40 mg tablet Take 1 tablet (40 mg) by mouth once daily. 90 tablet 3    DULoxetine (Cymbalta) 60 mg DR capsule Take 1 capsule (60 mg) by mouth once daily.      esomeprazole (NexIUM) 20 mg DR capsule Take 1 capsule (20 mg) by mouth once daily.      magnesium hydroxide (MAGNESIA ORAL) Take 500 mg by mouth.      multivitamin tablet Take 1 tablet by mouth once daily.      [DISCONTINUED] b complex 0.4 mg tablet Take 1 tablet by mouth once daily.      [DISCONTINUED] glucosam/chondro/herb 149/hyal (GLUCOS CHOND CPLX ADVANCED ORAL) Take by mouth.      [DISCONTINUED] meloxicam (Mobic) 7.5 mg tablet Take 1 tablet (7.5 mg) by mouth once daily. 90 tablet 3    [DISCONTINUED] meloxicam (Mobic) 7.5 mg tablet Take 1 tablet (7.5 mg) by mouth once daily as needed for moderate pain (4 - 6).      [DISCONTINUED] triamcinolone (Kenalog) 0.1 % cream Apply topically 2 times a day. Apply to affected area 1-2 times daily as needed. (Patient not taking: Reported on 5/23/2024) 15 g 1     No current facility-administered medications on file prior to visit.     Immunization History   Administered Date(s) Administered    Flu vaccine, quadrivalent, high-dose, preservative free, age 65y+ (FLUZONE) 11/17/2021, 11/29/2022, 01/16/2024     Influenza, Unspecified 11/29/2022    Influenza, injectable, quadrivalent 10/22/2018, 09/15/2020    Pfizer COVID-19 vaccine, Fall 2023, 12 years and older, (30mcg/0.3mL) 01/24/2024    Pfizer COVID-19 vaccine, bivalent, age 12 years and older (30 mcg/0.3 mL) 09/07/2022, 06/30/2023    Pfizer Gray Cap SARS-CoV-2 04/06/2022    Pfizer Purple Cap SARS-CoV-2 03/17/2021, 04/07/2021, 04/16/2021, 04/28/2021, 11/22/2021    Pneumococcal conjugate vaccine, 20-valent (PREVNAR 20) 06/13/2023    RSV, 60 Years And Older (AREXVY) 01/24/2024    Tdap vaccine, age 7 year and older (BOOSTRIX, ADACEL) 11/11/2015, 05/16/2016    Zoster vaccine, recombinant, adult (SHINGRIX) 06/29/2023, 08/27/2023    Zoster, live 05/16/2016     Patient's medical history was reviewed and updated either before or during this encounter.     Bryan Esparza MD

## 2024-08-07 ENCOUNTER — HOSPITAL ENCOUNTER (OUTPATIENT)
Dept: RADIOLOGY | Facility: CLINIC | Age: 69
Discharge: HOME | End: 2024-08-07
Payer: MEDICARE

## 2024-08-07 DIAGNOSIS — Z13.6 ENCOUNTER FOR ABDOMINAL AORTIC ANEURYSM (AAA) SCREENING: ICD-10-CM

## 2024-08-07 DIAGNOSIS — Z87.891 FORMER TOBACCO USE: ICD-10-CM

## 2024-08-07 PROCEDURE — 76706 US ABDL AORTA SCREEN AAA: CPT

## 2024-08-07 PROCEDURE — 76706 US ABDL AORTA SCREEN AAA: CPT | Performed by: RADIOLOGY

## 2024-08-20 DIAGNOSIS — G63 POLYNEUROPATHY ASSOCIATED WITH UNDERLYING DISEASE (MULTI): ICD-10-CM

## 2024-08-20 RX ORDER — DULOXETIN HYDROCHLORIDE 60 MG/1
60 CAPSULE, DELAYED RELEASE ORAL DAILY
Qty: 30 CAPSULE | Refills: 3 | Status: SHIPPED | OUTPATIENT
Start: 2024-08-20

## 2024-09-19 ENCOUNTER — APPOINTMENT (OUTPATIENT)
Dept: GASTROENTEROLOGY | Facility: CLINIC | Age: 69
End: 2024-09-19
Payer: MEDICARE

## 2024-09-19 VITALS
HEART RATE: 72 BPM | WEIGHT: 209.6 LBS | OXYGEN SATURATION: 94 % | BODY MASS INDEX: 30.07 KG/M2 | SYSTOLIC BLOOD PRESSURE: 145 MMHG | DIASTOLIC BLOOD PRESSURE: 89 MMHG

## 2024-09-19 DIAGNOSIS — K63.5 POLYP OF COLON, UNSPECIFIED PART OF COLON, UNSPECIFIED TYPE: Primary | ICD-10-CM

## 2024-09-19 DIAGNOSIS — Z12.12 ENCOUNTER FOR COLORECTAL CANCER SCREENING: ICD-10-CM

## 2024-09-19 DIAGNOSIS — Z12.11 ENCOUNTER FOR COLORECTAL CANCER SCREENING: ICD-10-CM

## 2024-09-19 PROCEDURE — 1159F MED LIST DOCD IN RCRD: CPT | Performed by: INTERNAL MEDICINE

## 2024-09-19 PROCEDURE — 3079F DIAST BP 80-89 MM HG: CPT | Performed by: INTERNAL MEDICINE

## 2024-09-19 PROCEDURE — 3077F SYST BP >= 140 MM HG: CPT | Performed by: INTERNAL MEDICINE

## 2024-09-19 PROCEDURE — 99203 OFFICE O/P NEW LOW 30 MIN: CPT | Performed by: INTERNAL MEDICINE

## 2024-09-19 RX ORDER — POLYETHYLENE GLYCOL 3350, SODIUM SULFATE ANHYDROUS, SODIUM BICARBONATE, SODIUM CHLORIDE, POTASSIUM CHLORIDE 236; 22.74; 6.74; 5.86; 2.97 G/4L; G/4L; G/4L; G/4L; G/4L
4000 POWDER, FOR SOLUTION ORAL ONCE
Qty: 4000 ML | Refills: 0 | Status: SHIPPED | OUTPATIENT
Start: 2024-09-19 | End: 2024-09-19

## 2024-09-19 NOTE — PROGRESS NOTES
Chief Complaint: Contreras De La Cruz is a 69 y.o. male who presents for Colonoscopy.  HPI  69-year-old male with history of hyperlipidemia, anxiety, hypertension, GERD came to GI clinic for establish care.  GERD symptom controlled  with Nexium 20 mg daily.  He reports mild intermittent abdominal discomfort.  bowel movement every day-mostly loose, once daily-baseline bowel habit.  Hemoglobin 10.6 with MCV 61 on 7/26/2024.  However iron study within normal limit .  TSH, BMP within normal limit.  He thinks  hemolytic disease runs in family.  EGD, colonoscopy outside hospital 10 years ago, no report available.  1 polyp removed as per patient, follow-up colonoscopy was recommended in 5 years.  Review of Systems   Gastrointestinal:         Abdominal discomfort, loose stool     12 Point ROS negative outside of symptoms stated above in HPI    Past Medical History:   Diagnosis Date    Chronic pain     Hypertension        Past Surgical History:   Procedure Laterality Date    BACK SURGERY  1988    BACK SURGERY  1998    NECK SURGERY  2003       No relevant family history has been documented for this patient.     reports that he has quit smoking. His smoking use included cigarettes. His smokeless tobacco use includes chew. He reports that he does not currently use alcohol. He reports current drug use. Frequency: 7.00 times per week. Drug: Marijuana.    Allergies   Allergen Reactions    Iodinated Contrast Media Other    Gabapentin Hives    Niacin-Lovastatin Hives       Imaging  No results found.      Laboratory  No results found for this or any previous visit (from the past 96 hour(s)).     Objective       Current Outpatient Medications:     acetaminophen (Tylenol Extra Strength) 500 mg tablet, Take 2 tablets (1,000 mg) by mouth every 8 hours if needed. Tylenol 8 Hour 500 mg, Disp: , Rfl:     atorvastatin (Lipitor) 40 mg tablet, Take 1 tablet (40 mg) by mouth once daily., Disp: 90 tablet, Rfl: 3    celecoxib (CeleBREX) 100 mg capsule,  Take 1 capsule (100 mg) by mouth 2 times a day., Disp: 60 capsule, Rfl: 1    cholecalciferol (Vitamin D3) 50 mcg (2,000 unit) capsule, Take 1 capsule (50 mcg) by mouth once daily., Disp: , Rfl:     DULoxetine (Cymbalta) 60 mg DR capsule, TAKE 1 CAPSULE BY MOUTH EVERY DAY, Disp: 30 capsule, Rfl: 3    esomeprazole (NexIUM) 20 mg DR capsule, Take 1 capsule (20 mg) by mouth once daily., Disp: , Rfl:     magnesium hydroxide (MAGNESIA ORAL), Take 500 mg by mouth., Disp: , Rfl:     multivitamin tablet, Take 1 tablet by mouth once daily., Disp: , Rfl:     Last Recorded Vitals  Blood pressure 145/89, pulse 72, weight 95.1 kg (209 lb 9.6 oz), SpO2 94%.    Physical Exam  HENT:      Mouth/Throat:      Mouth: Mucous membranes are moist.   Eyes:      General: No scleral icterus.  Cardiovascular:      Rate and Rhythm: Normal rate and regular rhythm.   Pulmonary:      Effort: Pulmonary effort is normal. No respiratory distress.      Breath sounds: Normal breath sounds.   Abdominal:      General: Abdomen is flat. Bowel sounds are normal.      Palpations: Abdomen is soft.   Skin:     Comments: Dark brown spot in right lower extremity   Neurological:      Mental Status: He is alert and oriented to person, place, and time.         Assessment/Plan   History of colon polyp, limited information  GERD  Mild anemia      Continue Nexium 20 mg , try to taper off in future.  Colonoscopy with MAC.  Monitor CBC  Continue follow-up with PMD.  Follow-up with GI in 6 months.

## 2024-09-19 NOTE — PATIENT INSTRUCTIONS
Continue Nexium 20 mg , try to taper off in future.  Colonoscopy with MAC.  Continue follow-up with PMD.  Follow-up with GI in 6 months.

## 2024-09-25 DIAGNOSIS — M15.9 PRIMARY OSTEOARTHRITIS INVOLVING MULTIPLE JOINTS: ICD-10-CM

## 2024-09-26 RX ORDER — CELECOXIB 100 MG/1
CAPSULE ORAL
Qty: 60 CAPSULE | Refills: 1 | Status: SHIPPED | OUTPATIENT
Start: 2024-09-26

## 2024-10-28 ENCOUNTER — TELEPHONE (OUTPATIENT)
Dept: PREADMISSION TESTING | Facility: HOSPITAL | Age: 69
End: 2024-10-28
Payer: MEDICARE

## 2024-10-29 ENCOUNTER — ANESTHESIA EVENT (OUTPATIENT)
Dept: OPERATING ROOM | Facility: HOSPITAL | Age: 69
End: 2024-10-29
Payer: MEDICARE

## 2024-10-30 ENCOUNTER — ANESTHESIA (OUTPATIENT)
Dept: OPERATING ROOM | Facility: HOSPITAL | Age: 69
End: 2024-10-30
Payer: MEDICARE

## 2024-10-30 ENCOUNTER — HOSPITAL ENCOUNTER (OUTPATIENT)
Dept: OPERATING ROOM | Facility: HOSPITAL | Age: 69
Setting detail: OUTPATIENT SURGERY
Discharge: HOME | End: 2024-10-30
Payer: MEDICARE

## 2024-10-30 VITALS
OXYGEN SATURATION: 99 % | TEMPERATURE: 96.8 F | DIASTOLIC BLOOD PRESSURE: 91 MMHG | HEART RATE: 65 BPM | RESPIRATION RATE: 16 BRPM | HEIGHT: 70 IN | BODY MASS INDEX: 29.89 KG/M2 | SYSTOLIC BLOOD PRESSURE: 137 MMHG | WEIGHT: 208.78 LBS

## 2024-10-30 DIAGNOSIS — K63.5 POLYP OF COLON, UNSPECIFIED PART OF COLON, UNSPECIFIED TYPE: ICD-10-CM

## 2024-10-30 PROCEDURE — 2500000004 HC RX 250 GENERAL PHARMACY W/ HCPCS (ALT 636 FOR OP/ED): Performed by: NURSE ANESTHETIST, CERTIFIED REGISTERED

## 2024-10-30 PROCEDURE — 3700000002 HC GENERAL ANESTHESIA TIME - EACH INCREMENTAL 1 MINUTE: Performed by: ANESTHESIOLOGY

## 2024-10-30 PROCEDURE — 3700000001 HC GENERAL ANESTHESIA TIME - INITIAL BASE CHARGE: Performed by: ANESTHESIOLOGY

## 2024-10-30 PROCEDURE — 7100000009 HC PHASE TWO TIME - INITIAL BASE CHARGE: Performed by: ANESTHESIOLOGY

## 2024-10-30 PROCEDURE — 7100000010 HC PHASE TWO TIME - EACH INCREMENTAL 1 MINUTE: Performed by: ANESTHESIOLOGY

## 2024-10-30 PROCEDURE — G0105 COLORECTAL SCRN; HI RISK IND: HCPCS | Performed by: INTERNAL MEDICINE

## 2024-10-30 PROCEDURE — 3600000002 HC OR TIME - INITIAL BASE CHARGE - PROCEDURE LEVEL TWO: Performed by: ANESTHESIOLOGY

## 2024-10-30 PROCEDURE — 3600000007 HC OR TIME - EACH INCREMENTAL 1 MINUTE - PROCEDURE LEVEL TWO: Performed by: ANESTHESIOLOGY

## 2024-10-30 RX ORDER — ONDANSETRON HYDROCHLORIDE 2 MG/ML
INJECTION, SOLUTION INTRAVENOUS AS NEEDED
Status: DISCONTINUED | OUTPATIENT
Start: 2024-10-30 | End: 2024-10-30

## 2024-10-30 RX ORDER — FENTANYL CITRATE 50 UG/ML
INJECTION, SOLUTION INTRAMUSCULAR; INTRAVENOUS AS NEEDED
Status: DISCONTINUED | OUTPATIENT
Start: 2024-10-30 | End: 2024-10-30

## 2024-10-30 RX ORDER — LIDOCAINE HYDROCHLORIDE 10 MG/ML
INJECTION, SOLUTION INFILTRATION; PERINEURAL AS NEEDED
Status: DISCONTINUED | OUTPATIENT
Start: 2024-10-30 | End: 2024-10-30

## 2024-10-30 RX ORDER — MIDAZOLAM HYDROCHLORIDE 1 MG/ML
INJECTION INTRAMUSCULAR; INTRAVENOUS AS NEEDED
Status: DISCONTINUED | OUTPATIENT
Start: 2024-10-30 | End: 2024-10-30

## 2024-10-30 RX ORDER — ONDANSETRON HYDROCHLORIDE 2 MG/ML
4 INJECTION, SOLUTION INTRAVENOUS ONCE AS NEEDED
Status: DISCONTINUED | OUTPATIENT
Start: 2024-10-30 | End: 2024-10-31 | Stop reason: HOSPADM

## 2024-10-30 RX ORDER — DROPERIDOL 2.5 MG/ML
0.62 INJECTION, SOLUTION INTRAMUSCULAR; INTRAVENOUS ONCE AS NEEDED
Status: DISCONTINUED | OUTPATIENT
Start: 2024-10-30 | End: 2024-10-31 | Stop reason: HOSPADM

## 2024-10-30 RX ORDER — PROPOFOL 10 MG/ML
INJECTION, EMULSION INTRAVENOUS AS NEEDED
Status: DISCONTINUED | OUTPATIENT
Start: 2024-10-30 | End: 2024-10-30

## 2024-10-30 SDOH — HEALTH STABILITY: MENTAL HEALTH: CURRENT SMOKER: 0

## 2024-10-30 ASSESSMENT — PAIN SCALES - GENERAL
PAIN_LEVEL: 2
PAINLEVEL_OUTOF10: 0 - NO PAIN
PAINLEVEL_OUTOF10: 0 - NO PAIN

## 2024-10-30 ASSESSMENT — PAIN - FUNCTIONAL ASSESSMENT
PAIN_FUNCTIONAL_ASSESSMENT: 0-10
PAIN_FUNCTIONAL_ASSESSMENT: 0-10

## 2024-11-08 ENCOUNTER — HOSPITAL ENCOUNTER (OUTPATIENT)
Dept: RADIOLOGY | Facility: CLINIC | Age: 69
Discharge: HOME | End: 2024-11-08
Payer: MEDICARE

## 2024-11-08 DIAGNOSIS — E78.2 MIXED HYPERLIPIDEMIA: ICD-10-CM

## 2024-11-08 DIAGNOSIS — I10 PRIMARY HYPERTENSION: ICD-10-CM

## 2024-11-08 DIAGNOSIS — Z13.6 ENCOUNTER FOR SCREENING FOR CARDIOVASCULAR DISORDERS: ICD-10-CM

## 2024-11-08 DIAGNOSIS — Z87.891 FORMER TOBACCO USE: ICD-10-CM

## 2024-11-08 PROCEDURE — 75571 CT HRT W/O DYE W/CA TEST: CPT

## 2024-12-06 NOTE — H&P (VIEW-ONLY)
[facility]  Follow Up Office Visit Note 12/10/2024    Patient Information: Contreras De La Cruz, MRN: [id number], : 1955   Primary Care/Referring Physician: Bryan Esparza MD, [address] [place] [address] / [place] [address]     Chief Complaint: Right low back/buttock pain  Interval History: He returns for follow up regarding worsening right low back/buttock pain    Today he reports doing worse since last seen. He feels this is partly due to the cold weather. He is primarily right low back/buttock pain without radiation of pain down into his legs. This is similar to the pain he had prior to his last right SI joint injection in 2024. This injection did provide 100% pain relief for greater than 6 months. He is interested in having this repeated    Brief History of Pain: Mr. Contreras De La Cruz is a 69 y.o. male with a PMHx of  HTN, HLD, GERD, tobacco use disorder who presents for evaluation of mid/low back, right leg, and tailbone pain.    For reference, he reports pain ongoing for many years, with recent exacerbation of low back and right leg pain. There was no obvious inciting event that caused this worsening pain. This pain worsens primarily with bending forward and lifting. He previously had an SCS in place for 10-15 years but had this removed in 2022 because he did not feel it was providing pain relief any longer. He was previously followed by Dr. Mcneal at [facility] for some time who was doing injections with benefit. Most recently he was followed by Dr. Herrera, who performed lumbar medial branch blocks with benefit, but the patient did not want to continue receiving injections since no sedation was given    Current Pain Medications: Uses THC gummies, Duloxetine 60 mg daily  Previously Tried Pain Medications: Tylenol - no benefit. Meloxicam, Celecoxib. He reports trying a number of medications for pain in the past but is not interested in restarting any of them    Relevant Surgeries: Hx of lumbar spine  surgery x2 (last in 1990's) as well as cervical spine surgery (2003). Intracept at L4, L5, S1 - 80% pain relief.   Injections: Caudal LIZZETTE - 80% pain relief. Right SI joint injection - 100% pain relief for 6 months. Knee steroid injections with minimal relief. Lumbar mbb's with only 30% relief. Reportedly had multiple other injections with Dr. Mcneal at [facility]   Physical/Occupational Therapy: Has done PT in the past but not recently    Medications:   Current Outpatient Medications   Medication Instructions    acetaminophen (Tylenol Extra Strength) 500 mg tablet 2 tablets, Every 8 hours PRN    atorvastatin (LIPITOR) 40 mg, oral, Daily    cholecalciferol (VITAMIN D3) 50 mcg, oral, Daily    DULoxetine (CYMBALTA) 60 mg, oral, Daily    esomeprazole (NEXIUM) 20 mg, oral, Daily    magnesium hydroxide (MAGNESIA ORAL) 500 mg    multivitamin tablet 1 tablet, oral, Daily      Allergies:   Allergies   Allergen Reactions    Iodinated Contrast Media Other    Gabapentin Hives    Niacin-Lovastatin Hives       Past Medical & Surgical History:  Past Medical History:   Diagnosis Date    Anemia     Cervical disc disease     Chronic fatigue     Chronic pain     Class 1 obesity with body mass index (BMI) of 30.0 to 30.9 in adult 07/30/2024    GERD (gastroesophageal reflux disease)     HLD (hyperlipidemia)     Hypertension     Impaired fasting glucose     Lumbar disc disease     Polyneuropathy     Polyp of colon     Primary osteoarthritis involving multiple joints     Thoracic disc disease       Past Surgical History:   Procedure Laterality Date    APPENDECTOMY  04/21/2016    ARTHRODESIS WRIST W/ OR W/O BONE GRAFT Right 10/31/2014    BACK SURGERY  1988    BACK SURGERY  1998    CARPAL TUNNEL RELEASE Right 10/31/2014    INSERTION / REMOVAL EPIDURAL SPINAL NEUROSTIMULATOR  09/16/2010    LUMBAR INSERTION    NECK SURGERY  2003    REVISION / REMOVAL NEUROSTIMULATOR  02/24/2020    LUMBAR GENERATOR REPLACEMENT    VARICOSE VEIN SURGERY         Family  "History   Problem Relation Name Age of Onset    Heart failure Mother      Cancer Father       Social History     Socioeconomic History    Marital status:      Spouse name: Not on file    Number of children: Not on file    Years of education: Not on file    Highest education level: Not on file   Occupational History    Not on file   Tobacco Use    Smoking status: Former     Types: Cigarettes    Smokeless tobacco: Current     Types: Chew   Vaping Use    Vaping status: Never Used   Substance and Sexual Activity    Alcohol use: Not Currently    Drug use: Yes     Frequency: 7.0 times per week     Types: Marijuana     Comment: THC 10 mg dailt    Sexual activity: Not on file   Other Topics Concern    Not on file   Social History Narrative    Not on file     Social Drivers of Health     Financial Resource Strain: Not on file   Food Insecurity: Not on file   Transportation Needs: Not on file   Physical Activity: Not on file   Stress: Not on file   Social Connections: Not on file   Intimate Partner Violence: Not on file   Housing Stability: Not on file       Problems, Past medical history, past surgical history, Medications, allergies, social and family history reviewed and as per the electronic medical record from today's encounter    Review of Systems:  CONST: No fever, chills, fatigue, weight changes  EYES: No loss of vision  ENT: No hearing loss, tinnitus  CV: No chest pain, palpitations  RESP: No dyspnea, shortness of breath, cough  GI: No stool incontinence, nausea, vomiting  : No urinary incontinence  MSK: No joint swelling  SKIN: No rash, no hives  NEURO: No headache, dizziness  PSYCH: No anxiety, depression  HEM/LYMPH: No easy bruising or bleeding    Physical Exam:  Vitals: /76   Pulse 69   Resp 20   Ht 1.778 m (5' 10\")   Wt 94.3 kg (208 lb)   SpO2 99%   BMI 29.84 kg/m²   General: No apparent distress. Alert, appropriate, oriented x 3. Mood generally positive, affect congruent. Speaking in full " "sentences.   HENT: Normocephalic, atraumatic. Hearing intact.  Eyes: Pupils equal and round  Neck: Supple, trachea midline  Lungs: Symmetric respiratory excursion on visual exam, nonlabored breathing.   Extremities: No cyanosis or edema noted in extremities.  Skin: No rashes, lesions noted.  Back: Reports pain to palpation overlying right SI joint. KENDALL test positive on the right, Gaenslen's test positive on the right, sacral compression test positive on the right.  Neuro: Alert and appropriate. Gait within normal limits. Bulk and tone within normal limits.    Laboratory Data:  The following laboratory data were reviewed during this visit:   Lab Results   Component Value Date    WBC 8.9 07/26/2024    RBC 6.93 (H) 07/26/2024    HGB 12.6 (L) 07/26/2024    HCT 42.2 07/26/2024     07/26/2024      No results found for: \"INR\"  Lab Results   Component Value Date    CREATININE 0.90 07/26/2024    HGBA1C 5.6 07/26/2024       Imaging:  The following imaging impressions were reviewed by me during this visit:    - 8/17/23 lumbar spine MRI shows L4/5 moderate posterior disc osteophyte complex, postsurgical changes about the right rosalia lamina, moderate right foraminal narrowing. L5/S1 asymmetric left paracentral and foraminal disc bulge likely abutting the descedning left S1 nerve root, postsurgical changes status post left hemilaminectomy. Post gadolinium imaging demonstrates mild left lateral and posterolateral epidural enhancement. No nerve root enhancement demonstrated. Modic changes noted at L5 and S1, and more subtly at L4.  -5/1/23 bilateral knee xray shows moderate OA  -8/11/23 thoracic spine CT shows T10-T11 right foraminal disc protrusion causing moderate right and no left foraminal stenosis or significant canal stenosis.    I also personally reviewed the images from the above studies myself. These images and my interpretation of them contributed to the management and decision making of the patient's medical " plan.    ASSESSMENT:  Mr. Contreras De La Cruz is a 69 y.o. male with low back and right leg pain that is consistent with:    1. Arthropathy of right sacroiliac joint          PLAN:  Radiology: -No new diagnostics at this time    Physically: Has done PT in the past without durable pain relief. Should maintain a regular HEP    Psychologically: No needs at this time    Medication: No changes to his medications today. He reports undergoing numerous medication trials in the past and is not interested in adding any new medications    Duration: Multiple years    Intervention: I suspect his low back/buttock pain is secondary to right sacroiliac joint arthropathy, lumbar radiculopathy and vertebrogenic low back pain. He has a fairly complex history of multiple back surgeries, multiple injections for pain, as well as SCS. He underwent Intracept at L4, L5, and S1 with 80% improvement in his axial back pain  - He has right leg pain that is likely radicular in nature. I performed a caudal LIZZETTE with 80% improvement in his right leg pain.   - He continues to have right buttock and groin pain which is most consistent with right sacroiliac joint arthropathy. He underwent a right SI joint injection with 100% pain relief for 6 months. This pain has returned to baseline thus I recommend repeating this utilizing live fluoroscopy and local anesthesia. Risks, benefits, alternatives discussed. He would like to proceed. We briefly discussed the possibility of SI joint fusion in the future.               Sincerely,  Bartolome Huber MD  [facility] - [place]

## 2024-12-06 NOTE — PROGRESS NOTES
[facility]  Follow Up Office Visit Note 12/10/2024    Patient Information: Contreras De La Cruz, MRN: [id number], : 1955   Primary Care/Referring Physician: Bryan Esparza MD, [address] [place] [address] / [place] [address]     Chief Complaint: Right low back/buttock pain  Interval History: He returns for follow up regarding worsening right low back/buttock pain    Today he reports doing worse since last seen. He feels this is partly due to the cold weather. He is primarily right low back/buttock pain without radiation of pain down into his legs. This is similar to the pain he had prior to his last right SI joint injection in 2024. This injection did provide 100% pain relief for greater than 6 months. He is interested in having this repeated    Brief History of Pain: Mr. Contreras De La Cruz is a 69 y.o. male with a PMHx of  HTN, HLD, GERD, tobacco use disorder who presents for evaluation of mid/low back, right leg, and tailbone pain.    For reference, he reports pain ongoing for many years, with recent exacerbation of low back and right leg pain. There was no obvious inciting event that caused this worsening pain. This pain worsens primarily with bending forward and lifting. He previously had an SCS in place for 10-15 years but had this removed in 2022 because he did not feel it was providing pain relief any longer. He was previously followed by Dr. Mcneal at [facility] for some time who was doing injections with benefit. Most recently he was followed by Dr. Herrera, who performed lumbar medial branch blocks with benefit, but the patient did not want to continue receiving injections since no sedation was given    Current Pain Medications: Uses THC gummies, Duloxetine 60 mg daily  Previously Tried Pain Medications: Tylenol - no benefit. Meloxicam, Celecoxib. He reports trying a number of medications for pain in the past but is not interested in restarting any of them    Relevant Surgeries: Hx of lumbar spine  surgery x2 (last in 1990's) as well as cervical spine surgery (2003). Intracept at L4, L5, S1 - 80% pain relief.   Injections: Caudal LIZZETTE - 80% pain relief. Right SI joint injection - 100% pain relief for 6 months. Knee steroid injections with minimal relief. Lumbar mbb's with only 30% relief. Reportedly had multiple other injections with Dr. Mcneal at [facility]   Physical/Occupational Therapy: Has done PT in the past but not recently    Medications:   Current Outpatient Medications   Medication Instructions    acetaminophen (Tylenol Extra Strength) 500 mg tablet 2 tablets, Every 8 hours PRN    atorvastatin (LIPITOR) 40 mg, oral, Daily    cholecalciferol (VITAMIN D3) 50 mcg, oral, Daily    DULoxetine (CYMBALTA) 60 mg, oral, Daily    esomeprazole (NEXIUM) 20 mg, oral, Daily    magnesium hydroxide (MAGNESIA ORAL) 500 mg    multivitamin tablet 1 tablet, oral, Daily      Allergies:   Allergies   Allergen Reactions    Iodinated Contrast Media Other    Gabapentin Hives    Niacin-Lovastatin Hives       Past Medical & Surgical History:  Past Medical History:   Diagnosis Date    Anemia     Cervical disc disease     Chronic fatigue     Chronic pain     Class 1 obesity with body mass index (BMI) of 30.0 to 30.9 in adult 07/30/2024    GERD (gastroesophageal reflux disease)     HLD (hyperlipidemia)     Hypertension     Impaired fasting glucose     Lumbar disc disease     Polyneuropathy     Polyp of colon     Primary osteoarthritis involving multiple joints     Thoracic disc disease       Past Surgical History:   Procedure Laterality Date    APPENDECTOMY  04/21/2016    ARTHRODESIS WRIST W/ OR W/O BONE GRAFT Right 10/31/2014    BACK SURGERY  1988    BACK SURGERY  1998    CARPAL TUNNEL RELEASE Right 10/31/2014    INSERTION / REMOVAL EPIDURAL SPINAL NEUROSTIMULATOR  09/16/2010    LUMBAR INSERTION    NECK SURGERY  2003    REVISION / REMOVAL NEUROSTIMULATOR  02/24/2020    LUMBAR GENERATOR REPLACEMENT    VARICOSE VEIN SURGERY         Family  "History   Problem Relation Name Age of Onset    Heart failure Mother      Cancer Father       Social History     Socioeconomic History    Marital status:      Spouse name: Not on file    Number of children: Not on file    Years of education: Not on file    Highest education level: Not on file   Occupational History    Not on file   Tobacco Use    Smoking status: Former     Types: Cigarettes    Smokeless tobacco: Current     Types: Chew   Vaping Use    Vaping status: Never Used   Substance and Sexual Activity    Alcohol use: Not Currently    Drug use: Yes     Frequency: 7.0 times per week     Types: Marijuana     Comment: THC 10 mg dailt    Sexual activity: Not on file   Other Topics Concern    Not on file   Social History Narrative    Not on file     Social Drivers of Health     Financial Resource Strain: Not on file   Food Insecurity: Not on file   Transportation Needs: Not on file   Physical Activity: Not on file   Stress: Not on file   Social Connections: Not on file   Intimate Partner Violence: Not on file   Housing Stability: Not on file       Problems, Past medical history, past surgical history, Medications, allergies, social and family history reviewed and as per the electronic medical record from today's encounter    Review of Systems:  CONST: No fever, chills, fatigue, weight changes  EYES: No loss of vision  ENT: No hearing loss, tinnitus  CV: No chest pain, palpitations  RESP: No dyspnea, shortness of breath, cough  GI: No stool incontinence, nausea, vomiting  : No urinary incontinence  MSK: No joint swelling  SKIN: No rash, no hives  NEURO: No headache, dizziness  PSYCH: No anxiety, depression  HEM/LYMPH: No easy bruising or bleeding    Physical Exam:  Vitals: /76   Pulse 69   Resp 20   Ht 1.778 m (5' 10\")   Wt 94.3 kg (208 lb)   SpO2 99%   BMI 29.84 kg/m²   General: No apparent distress. Alert, appropriate, oriented x 3. Mood generally positive, affect congruent. Speaking in full " "sentences.   HENT: Normocephalic, atraumatic. Hearing intact.  Eyes: Pupils equal and round  Neck: Supple, trachea midline  Lungs: Symmetric respiratory excursion on visual exam, nonlabored breathing.   Extremities: No cyanosis or edema noted in extremities.  Skin: No rashes, lesions noted.  Back: Reports pain to palpation overlying right SI joint. KENDALL test positive on the right, Gaenslen's test positive on the right, sacral compression test positive on the right.  Neuro: Alert and appropriate. Gait within normal limits. Bulk and tone within normal limits.    Laboratory Data:  The following laboratory data were reviewed during this visit:   Lab Results   Component Value Date    WBC 8.9 07/26/2024    RBC 6.93 (H) 07/26/2024    HGB 12.6 (L) 07/26/2024    HCT 42.2 07/26/2024     07/26/2024      No results found for: \"INR\"  Lab Results   Component Value Date    CREATININE 0.90 07/26/2024    HGBA1C 5.6 07/26/2024       Imaging:  The following imaging impressions were reviewed by me during this visit:    - 8/17/23 lumbar spine MRI shows L4/5 moderate posterior disc osteophyte complex, postsurgical changes about the right rosalia lamina, moderate right foraminal narrowing. L5/S1 asymmetric left paracentral and foraminal disc bulge likely abutting the descedning left S1 nerve root, postsurgical changes status post left hemilaminectomy. Post gadolinium imaging demonstrates mild left lateral and posterolateral epidural enhancement. No nerve root enhancement demonstrated. Modic changes noted at L5 and S1, and more subtly at L4.  -5/1/23 bilateral knee xray shows moderate OA  -8/11/23 thoracic spine CT shows T10-T11 right foraminal disc protrusion causing moderate right and no left foraminal stenosis or significant canal stenosis.    I also personally reviewed the images from the above studies myself. These images and my interpretation of them contributed to the management and decision making of the patient's medical " plan.    ASSESSMENT:  Mr. Contreras De La Cruz is a 69 y.o. male with low back and right leg pain that is consistent with:    1. Arthropathy of right sacroiliac joint          PLAN:  Radiology: -No new diagnostics at this time    Physically: Has done PT in the past without durable pain relief. Should maintain a regular HEP    Psychologically: No needs at this time    Medication: No changes to his medications today. He reports undergoing numerous medication trials in the past and is not interested in adding any new medications    Duration: Multiple years    Intervention: I suspect his low back/buttock pain is secondary to right sacroiliac joint arthropathy, lumbar radiculopathy and vertebrogenic low back pain. He has a fairly complex history of multiple back surgeries, multiple injections for pain, as well as SCS. He underwent Intracept at L4, L5, and S1 with 80% improvement in his axial back pain  - He has right leg pain that is likely radicular in nature. I performed a caudal LIZZETTE with 80% improvement in his right leg pain.   - He continues to have right buttock and groin pain which is most consistent with right sacroiliac joint arthropathy. He underwent a right SI joint injection with 100% pain relief for 6 months. This pain has returned to baseline thus I recommend repeating this utilizing live fluoroscopy and local anesthesia. Risks, benefits, alternatives discussed. He would like to proceed. We briefly discussed the possibility of SI joint fusion in the future.               Sincerely,  Bartolome Huber MD  [facility] - [place]

## 2024-12-10 ENCOUNTER — OFFICE VISIT (OUTPATIENT)
Dept: PAIN MEDICINE | Facility: CLINIC | Age: 69
End: 2024-12-10
Payer: MEDICARE

## 2024-12-10 VITALS
WEIGHT: 208 LBS | RESPIRATION RATE: 20 BRPM | HEART RATE: 69 BPM | OXYGEN SATURATION: 99 % | BODY MASS INDEX: 29.78 KG/M2 | DIASTOLIC BLOOD PRESSURE: 76 MMHG | SYSTOLIC BLOOD PRESSURE: 124 MMHG | HEIGHT: 70 IN

## 2024-12-10 DIAGNOSIS — M47.818 ARTHROPATHY OF RIGHT SACROILIAC JOINT: Primary | ICD-10-CM

## 2024-12-10 PROCEDURE — 3078F DIAST BP <80 MM HG: CPT | Performed by: STUDENT IN AN ORGANIZED HEALTH CARE EDUCATION/TRAINING PROGRAM

## 2024-12-10 PROCEDURE — G2211 COMPLEX E/M VISIT ADD ON: HCPCS | Performed by: STUDENT IN AN ORGANIZED HEALTH CARE EDUCATION/TRAINING PROGRAM

## 2024-12-10 PROCEDURE — 3008F BODY MASS INDEX DOCD: CPT | Performed by: STUDENT IN AN ORGANIZED HEALTH CARE EDUCATION/TRAINING PROGRAM

## 2024-12-10 PROCEDURE — 1125F AMNT PAIN NOTED PAIN PRSNT: CPT | Performed by: STUDENT IN AN ORGANIZED HEALTH CARE EDUCATION/TRAINING PROGRAM

## 2024-12-10 PROCEDURE — 99214 OFFICE O/P EST MOD 30 MIN: CPT | Performed by: STUDENT IN AN ORGANIZED HEALTH CARE EDUCATION/TRAINING PROGRAM

## 2024-12-10 PROCEDURE — 1159F MED LIST DOCD IN RCRD: CPT | Performed by: STUDENT IN AN ORGANIZED HEALTH CARE EDUCATION/TRAINING PROGRAM

## 2024-12-10 PROCEDURE — 3074F SYST BP LT 130 MM HG: CPT | Performed by: STUDENT IN AN ORGANIZED HEALTH CARE EDUCATION/TRAINING PROGRAM

## 2024-12-10 PROCEDURE — 1160F RVW MEDS BY RX/DR IN RCRD: CPT | Performed by: STUDENT IN AN ORGANIZED HEALTH CARE EDUCATION/TRAINING PROGRAM

## 2024-12-10 ASSESSMENT — PAIN SCALES - GENERAL
PAINLEVEL_OUTOF10: 4
PAINLEVEL_OUTOF10: 4

## 2024-12-10 ASSESSMENT — PAIN - FUNCTIONAL ASSESSMENT: PAIN_FUNCTIONAL_ASSESSMENT: 0-10

## 2024-12-10 ASSESSMENT — PAIN DESCRIPTION - DESCRIPTORS: DESCRIPTORS: ACHING;SHARP

## 2024-12-15 DIAGNOSIS — G63 POLYNEUROPATHY ASSOCIATED WITH UNDERLYING DISEASE (MULTI): ICD-10-CM

## 2024-12-16 RX ORDER — DULOXETIN HYDROCHLORIDE 60 MG/1
60 CAPSULE, DELAYED RELEASE ORAL DAILY
Qty: 90 CAPSULE | Refills: 3 | Status: SHIPPED | OUTPATIENT
Start: 2024-12-16

## 2024-12-23 ENCOUNTER — HOSPITAL ENCOUNTER (OUTPATIENT)
Dept: GASTROENTEROLOGY | Facility: HOSPITAL | Age: 69
Discharge: HOME | End: 2024-12-23
Payer: MEDICARE

## 2024-12-23 VITALS
WEIGHT: 215 LBS | DIASTOLIC BLOOD PRESSURE: 78 MMHG | HEART RATE: 64 BPM | BODY MASS INDEX: 30.78 KG/M2 | SYSTOLIC BLOOD PRESSURE: 136 MMHG | RESPIRATION RATE: 17 BRPM | HEIGHT: 70 IN | OXYGEN SATURATION: 100 % | TEMPERATURE: 97.2 F

## 2024-12-23 DIAGNOSIS — M47.818 ARTHROPATHY OF RIGHT SACROILIAC JOINT: ICD-10-CM

## 2024-12-23 PROCEDURE — 27096 INJECT SACROILIAC JOINT: CPT | Performed by: STUDENT IN AN ORGANIZED HEALTH CARE EDUCATION/TRAINING PROGRAM

## 2024-12-23 PROCEDURE — 2550000001 HC RX 255 CONTRASTS: Performed by: STUDENT IN AN ORGANIZED HEALTH CARE EDUCATION/TRAINING PROGRAM

## 2024-12-23 PROCEDURE — 2500000004 HC RX 250 GENERAL PHARMACY W/ HCPCS (ALT 636 FOR OP/ED): Performed by: STUDENT IN AN ORGANIZED HEALTH CARE EDUCATION/TRAINING PROGRAM

## 2024-12-23 RX ORDER — LIDOCAINE HYDROCHLORIDE 10 MG/ML
INJECTION, SOLUTION EPIDURAL; INFILTRATION; INTRACAUDAL; PERINEURAL AS NEEDED
Status: COMPLETED | OUTPATIENT
Start: 2024-12-23 | End: 2024-12-23

## 2024-12-23 RX ORDER — TRIAMCINOLONE ACETONIDE 40 MG/ML
INJECTION, SUSPENSION INTRA-ARTICULAR; INTRAMUSCULAR AS NEEDED
Status: COMPLETED | OUTPATIENT
Start: 2024-12-23 | End: 2024-12-23

## 2024-12-23 RX ORDER — ROPIVACAINE HYDROCHLORIDE 2 MG/ML
INJECTION, SOLUTION EPIDURAL; INFILTRATION; PERINEURAL AS NEEDED
Status: COMPLETED | OUTPATIENT
Start: 2024-12-23 | End: 2024-12-23

## 2024-12-23 ASSESSMENT — ENCOUNTER SYMPTOMS
LOSS OF SENSATION IN FEET: 1
DEPRESSION: 1
OCCASIONAL FEELINGS OF UNSTEADINESS: 1

## 2024-12-23 ASSESSMENT — COLUMBIA-SUICIDE SEVERITY RATING SCALE - C-SSRS
1. IN THE PAST MONTH, HAVE YOU WISHED YOU WERE DEAD OR WISHED YOU COULD GO TO SLEEP AND NOT WAKE UP?: NO
6. HAVE YOU EVER DONE ANYTHING, STARTED TO DO ANYTHING, OR PREPARED TO DO ANYTHING TO END YOUR LIFE?: NO
2. HAVE YOU ACTUALLY HAD ANY THOUGHTS OF KILLING YOURSELF?: NO

## 2024-12-23 ASSESSMENT — PAIN DESCRIPTION - DESCRIPTORS: DESCRIPTORS: SHARP;SPASM

## 2024-12-23 ASSESSMENT — PAIN SCALES - GENERAL: PAINLEVEL_OUTOF10: 5 - MODERATE PAIN

## 2024-12-23 ASSESSMENT — PAIN - FUNCTIONAL ASSESSMENT: PAIN_FUNCTIONAL_ASSESSMENT: 0-10

## 2024-12-23 NOTE — Clinical Note
Patient states he has an allergy to contrast dye with nausea and vomiting as the reaction. Patient states he has had this procedure done with omnipaque and had no reaction. Dr. Huber confirmed with patient and records.

## 2024-12-23 NOTE — DISCHARGE INSTRUCTIONS
DISCHARGE INSTRUCTIONS FOR INJECTIONS     You underwent a right sided sacroiliac joint steroid injection today    Aftermost injections, it is recommended that you relax and limit your activity for the remainder of the day unless you have been told otherwise by your pain physician.  You should not drive a car, operate machinery, or make important legal decisions unless otherwise directed by your pain physician.  You may resume your normal activity, including exercise, tomorrow.      Keep a written pain diary of how much pain relief you experienced following the injection procedure and the length of time of pain relief you experienced pain relief. Following diagnostic injections like medial branch nerve blocks, sacroiliac joint blocks, stellate ganglion injections and other blocks, it is very important you record the specific amount of pain relief you experienced immediately after the injectionand how long it lasted. Your doctor will ask you for this information at your follow up visit.     For all injections, please keep the injection site dry and inspect the site for a couple of days. You may remove the Band-Aid the day of the injection at any time.     Some discomfort, bruising or slight swelling may occur at the injection site. This is not abnormal if it occurs.  If needed you may:    -Take over the counter medication such as Tylenol or Motrin.   -Apply an ice pack for 30 minutes, 2 to 3 times a day for the first 24 hours.     You may shower today; no soaking baths, hot tubs, whirlpools or swimming pools for two days.      If you are given steroids in your injection, it may take 3-5 days for the steroid medication to take effect. You may notice a worsening of your symptoms for 1-2 days after the injection. This is not abnormal.  You may use acetaminophen, ibuprofen, or prescription medication that your doctor may have prescribed for you if you need to do so.     A few common side effects of steroids include facial  flushing, sweating, restlessness, irritability,difficulty sleeping, increase in blood sugar, and increased blood pressure. If you have diabetes, please monitor your blood sugar at least once a day for at least 5 days. If you have poorly controlled high blood pressure, monitoryour blood pressure for at least 2 days and contact your primary care physician if these numbers are unusually high for you.      If you take aspirin or non-steroidal anti-inflammatory drugs (examples are Motrin, Advil, ibuprofen, Naprosyn, Voltaren, Relafen, etc.) you may restart these this evening, but stop taking it 3 days before your next appointment, unless instructed otherwiseby your physician.      You do not need to discontinue non-aspirin-containing pain medications prior to an injection (examples: Celebrex, tramadol, hydrocodone and acetaminophen).      If you take a blood thinning medication (Coumadin, Lovenox, Fragmin,Ticlid, Plavix, Pradaxa, etc.), please discuss this with your primary care physician/cardiologist and your pain physician. These medications MUST be discontinued before you can have an injection safely, without the risk of uncontrolled bleeding. If these medications are not discontinued for an appropriate period of time, you will not be able to receivean injection.      If you are taking Coumadin, please have your INR checked the morning of your procedure and bringthe result to your appointment unless otherwise instructed. If your INR is over 1.2, your injection may need to be rescheduled to avoid uncontrolled bleeding from the needle placement.     Call [facility] at [phone] between 8am-4pm Monday - Friday if you are experiencing the following:    If you received an epidural or spinal injection:    -Headache that doesnot go away with medicine, is worse when sitting or standing up, and is greatly relieved upon lying down.   -Severe pain worse than or different than your baseline pain.   -Chills  or fever (101º F or greater).   -Drainage or signs of infection at the injection site     Go directly to the Emergency Department if you are experiencing the following and received an epidural or spinal injection:   -Abrupt weakness or progressive weakness in your legs that starts after you leave the clinic.   -Abrupt severe or worsening numbness in your legs.   -Inability to urinate after the injection or loss of bowel or bladder control without the urge to defecate or urinate.     If you have a clinical question that cannot wait until your next appointment, please call [phone] between 8am-4pm Monday - Friday or send a [employer] message. We do our best to return all non-emergency messages within 24 hours, Monday - Friday. A nurse or physician will return your message.      If you need to cancel an appointment, please call the scheduling staff at [phone] during normal business hours or leave a message at least 24 hours in advance.     If you are going to be sedated for your next procedure, you MUST have responsible adult who can legally drive accompany you home. You cannot eat or drink for eight hours prior to the planned procedure if you are going to receive sedation. You may take your non-blood thinning medications with a small sip of water.

## 2025-01-20 NOTE — PROGRESS NOTES
[facility]  Follow Up Office Visit Note 2025    Patient Information: Contreras De La Cruz, MRN: [id number], : 1955   Primary Care/Referring Physician: Bryan Esparza MD, [address] [place] [address] / [place] [address]     Chief Complaint: Right low back/buttock pain  Interval History: He returns for follow up after right SI joint injection    Today he reports 80% improvement in his pain and is overall pleased. Since this pain has improved, he has been noticing a little more right groin and lateral hip pain, as well as some right leg swelling, when he is more active. However, this is overall manageable.     Brief History of Pain: Mr. Contreras De La Cruz is a 69 y.o. male with a PMHx of  HTN, HLD, GERD, tobacco use disorder who presents for evaluation of mid/low back, right leg, and tailbone pain.    For reference, he reports pain ongoing for many years, with recent exacerbation of low back and right leg pain. There was no obvious inciting event that caused this worsening pain. This pain worsens primarily with bending forward and lifting. He previously had an SCS in place for 10-15 years but had this removed in 2022 because he did not feel it was providing pain relief any longer. He was previously followed by Dr. Mcneal at [facility] for some time who was doing injections with benefit. Most recently he was followed by Dr. Herrera, who performed lumbar medial branch blocks with benefit, but the patient did not want to continue receiving injections since no sedation was given    Current Pain Medications: Uses THC gummies, Duloxetine 60 mg daily  Previously Tried Pain Medications: Tylenol - no benefit. Meloxicam, Celecoxib. He reports trying a number of medications for pain in the past but is not interested in restarting any of them    Relevant Surgeries: Hx of lumbar spine surgery x2 (last in ) as well as cervical spine surgery (). Intracept at L4, L5, S1 - 80% pain relief.   Injections: Caudal LIZZETTE -  80% pain relief. Right SI joint injection - 100% pain relief for 6 months. Knee steroid injections with minimal relief. Lumbar mbb's with only 30% relief. Reportedly had multiple other injections with Dr. Mcneal at [facility]   Physical/Occupational Therapy: Has done PT in the past but not recently    Medications:   Current Outpatient Medications   Medication Instructions    acetaminophen (Tylenol Extra Strength) 500 mg tablet 2 tablets, Every 8 hours PRN    atorvastatin (LIPITOR) 40 mg, oral, Daily    cholecalciferol (VITAMIN D3) 50 mcg, oral, Daily    DULoxetine (CYMBALTA) 60 mg, oral, Daily    esomeprazole (NEXIUM) 20 mg, oral, Daily    magnesium hydroxide (MAGNESIA ORAL) 500 mg    multivitamin tablet 1 tablet, oral, Daily      Allergies:   Allergies   Allergen Reactions    Iodinated Contrast Media Other    Gabapentin Hives    Niacin-Lovastatin Hives       Past Medical & Surgical History:  Past Medical History:   Diagnosis Date    Anemia     Cervical disc disease     Chronic fatigue     Chronic pain     Class 1 obesity with body mass index (BMI) of 30.0 to 30.9 in adult 07/30/2024    GERD (gastroesophageal reflux disease)     HLD (hyperlipidemia)     Hypertension     Impaired fasting glucose     Lumbar disc disease     Polyneuropathy     Polyp of colon     Primary osteoarthritis involving multiple joints     Thoracic disc disease       Past Surgical History:   Procedure Laterality Date    APPENDECTOMY  04/21/2016    ARTHRODESIS WRIST W/ OR W/O BONE GRAFT Right 10/31/2014    BACK SURGERY  1988    BACK SURGERY  1998    CARPAL TUNNEL RELEASE Right 10/31/2014    INSERTION / REMOVAL EPIDURAL SPINAL NEUROSTIMULATOR  09/16/2010    LUMBAR INSERTION    NECK SURGERY  2003    REVISION / REMOVAL NEUROSTIMULATOR  02/24/2020    LUMBAR GENERATOR REPLACEMENT    VARICOSE VEIN SURGERY         Family History   Problem Relation Name Age of Onset    Heart failure Mother      Cancer Father       Social History     Socioeconomic History     "Marital status:      Spouse name: Not on file    Number of children: Not on file    Years of education: Not on file    Highest education level: Not on file   Occupational History    Not on file   Tobacco Use    Smoking status: Former     Types: Cigarettes    Smokeless tobacco: Current     Types: Chew   Vaping Use    Vaping status: Never Used   Substance and Sexual Activity    Alcohol use: Not Currently    Drug use: Yes     Frequency: 7.0 times per week     Types: Marijuana     Comment: THC 10 mg dailt    Sexual activity: Not on file   Other Topics Concern    Not on file   Social History Narrative    Not on file     Social Drivers of Health     Financial Resource Strain: Not on file   Food Insecurity: Not on file   Transportation Needs: Not on file   Physical Activity: Not on file   Stress: Not on file   Social Connections: Not on file   Intimate Partner Violence: Not on file   Housing Stability: Not on file       Problems, Past medical history, past surgical history, Medications, allergies, social and family history reviewed and as per the electronic medical record from today's encounter    Review of Systems:  CONST: No fever, chills, fatigue, weight changes  EYES: No loss of vision  ENT: No hearing loss, tinnitus  CV: No chest pain, palpitations  RESP: No dyspnea, shortness of breath, cough  GI: No stool incontinence, nausea, vomiting  : No urinary incontinence  MSK: No joint swelling  SKIN: No rash, no hives  NEURO: No headache, dizziness  PSYCH: No anxiety, depression  HEM/LYMPH: No easy bruising or bleeding    Physical Exam:  Vitals: /82   Pulse 75   Resp 18   Ht 1.778 m (5' 10\")   Wt 97.5 kg (215 lb)   SpO2 97%   BMI 30.85 kg/m²   General: No apparent distress. Alert, appropriate, oriented x 3. Mood generally positive, affect congruent. Speaking in full sentences.   HENT: Normocephalic, atraumatic. Hearing intact.  Eyes: Pupils equal and round  Neck: Supple, trachea midline  Lungs: " "Symmetric respiratory excursion on visual exam, nonlabored breathing.   Extremities: No cyanosis or edema noted in extremities.  Skin: No rashes, lesions noted.  MSK: No pain with right hip scour maneuver. Reports mild pain with right hip FADIR maneuver  Neuro: Alert and appropriate. Gait within normal limits. Bulk and tone within normal limits.    Laboratory Data:  The following laboratory data were reviewed during this visit:   Lab Results   Component Value Date    WBC 8.9 07/26/2024    RBC 6.93 (H) 07/26/2024    HGB 12.6 (L) 07/26/2024    HCT 42.2 07/26/2024     07/26/2024      No results found for: \"INR\"  Lab Results   Component Value Date    CREATININE 0.90 07/26/2024    HGBA1C 5.6 07/26/2024       Imaging:  The following imaging impressions were reviewed by me during this visit:    - 8/17/23 lumbar spine MRI shows L4/5 moderate posterior disc osteophyte complex, postsurgical changes about the right rosalia lamina, moderate right foraminal narrowing. L5/S1 asymmetric left paracentral and foraminal disc bulge likely abutting the descedning left S1 nerve root, postsurgical changes status post left hemilaminectomy. Post gadolinium imaging demonstrates mild left lateral and posterolateral epidural enhancement. No nerve root enhancement demonstrated. Modic changes noted at L5 and S1, and more subtly at L4.  -5/1/23 bilateral knee xray shows moderate OA  -8/11/23 thoracic spine CT shows T10-T11 right foraminal disc protrusion causing moderate right and no left foraminal stenosis or significant canal stenosis.    I also personally reviewed the images from the above studies myself. These images and my interpretation of them contributed to the management and decision making of the patient's medical plan.    ASSESSMENT:  Mr. Contreras De La Cruz is a 69 y.o. male with low back and right leg pain that is consistent with:    1. Arthropathy of right sacroiliac joint        PLAN:  Radiology: -No new diagnostics at this time. " Would consider a right hip xray if his groin pain worsens    Physically: Has done PT in the past without durable pain relief. Should maintain a regular HEP    Psychologically: No needs at this time    Medication: No changes to his medications today. He reports undergoing numerous medication trials in the past and is not interested in adding any new medications    Duration: Multiple years    Intervention: I suspect his low back/buttock pain is secondary to right sacroiliac joint arthropathy, lumbar radiculopathy and vertebrogenic low back pain. He has a fairly complex history of multiple back surgeries, multiple injections for pain, as well as SCS. He underwent Intracept at L4, L5, and S1 with 80% improvement in his axial back pain  - He has right leg pain that is likely radicular in nature. I performed a caudal LIZZETTE with 80% improvement in his right leg pain.   - He continues to have right buttock and groin pain which is most consistent with right sacroiliac joint arthropathy. He underwent a right SI joint injection with 100% pain relief for 6 months. This was repeated with 80% relief. Will monitor for duration of relief. We briefly discussed the possibility of SI joint fusion in the future.               Sincerely,  Bartolome Huber MD  [facility] - [place]

## 2025-01-21 ENCOUNTER — OFFICE VISIT (OUTPATIENT)
Dept: PAIN MEDICINE | Facility: CLINIC | Age: 70
End: 2025-01-21
Payer: MEDICARE

## 2025-01-21 VITALS
RESPIRATION RATE: 18 BRPM | OXYGEN SATURATION: 97 % | WEIGHT: 215 LBS | DIASTOLIC BLOOD PRESSURE: 82 MMHG | SYSTOLIC BLOOD PRESSURE: 144 MMHG | HEIGHT: 70 IN | BODY MASS INDEX: 30.78 KG/M2 | HEART RATE: 75 BPM

## 2025-01-21 DIAGNOSIS — M47.818 ARTHROPATHY OF RIGHT SACROILIAC JOINT: Primary | ICD-10-CM

## 2025-01-21 PROCEDURE — 1159F MED LIST DOCD IN RCRD: CPT | Performed by: STUDENT IN AN ORGANIZED HEALTH CARE EDUCATION/TRAINING PROGRAM

## 2025-01-21 PROCEDURE — 99213 OFFICE O/P EST LOW 20 MIN: CPT | Performed by: STUDENT IN AN ORGANIZED HEALTH CARE EDUCATION/TRAINING PROGRAM

## 2025-01-21 PROCEDURE — G2211 COMPLEX E/M VISIT ADD ON: HCPCS | Performed by: STUDENT IN AN ORGANIZED HEALTH CARE EDUCATION/TRAINING PROGRAM

## 2025-01-21 PROCEDURE — 3077F SYST BP >= 140 MM HG: CPT | Performed by: STUDENT IN AN ORGANIZED HEALTH CARE EDUCATION/TRAINING PROGRAM

## 2025-01-21 PROCEDURE — 3079F DIAST BP 80-89 MM HG: CPT | Performed by: STUDENT IN AN ORGANIZED HEALTH CARE EDUCATION/TRAINING PROGRAM

## 2025-01-21 PROCEDURE — 1125F AMNT PAIN NOTED PAIN PRSNT: CPT | Performed by: STUDENT IN AN ORGANIZED HEALTH CARE EDUCATION/TRAINING PROGRAM

## 2025-01-21 PROCEDURE — 3008F BODY MASS INDEX DOCD: CPT | Performed by: STUDENT IN AN ORGANIZED HEALTH CARE EDUCATION/TRAINING PROGRAM

## 2025-01-21 PROCEDURE — 1160F RVW MEDS BY RX/DR IN RCRD: CPT | Performed by: STUDENT IN AN ORGANIZED HEALTH CARE EDUCATION/TRAINING PROGRAM

## 2025-01-21 ASSESSMENT — PAIN SCALES - GENERAL
PAINLEVEL_OUTOF10: 1
PAINLEVEL_OUTOF10: 1

## 2025-01-21 ASSESSMENT — PAIN - FUNCTIONAL ASSESSMENT: PAIN_FUNCTIONAL_ASSESSMENT: 0-10

## 2025-01-21 ASSESSMENT — PAIN DESCRIPTION - DESCRIPTORS: DESCRIPTORS: ACHING

## 2025-01-24 ENCOUNTER — LAB (OUTPATIENT)
Dept: LAB | Facility: LAB | Age: 70
End: 2025-01-24
Payer: MEDICARE

## 2025-01-24 DIAGNOSIS — E78.2 MIXED HYPERLIPIDEMIA: ICD-10-CM

## 2025-01-24 DIAGNOSIS — E55.9 VITAMIN D DEFICIENCY: ICD-10-CM

## 2025-01-24 DIAGNOSIS — R53.82 CHRONIC FATIGUE: ICD-10-CM

## 2025-01-24 LAB
25(OH)D3 SERPL-MCNC: 26 NG/ML (ref 30–100)
ALBUMIN SERPL BCP-MCNC: 4.7 G/DL (ref 3.4–5)
ALP SERPL-CCNC: 56 U/L (ref 33–136)
ALT SERPL W P-5'-P-CCNC: 36 U/L (ref 10–52)
ANION GAP SERPL CALCULATED.3IONS-SCNC: 13 MMOL/L (ref 10–20)
AST SERPL W P-5'-P-CCNC: 20 U/L (ref 9–39)
BASOPHILS # BLD AUTO: 0.12 X10*3/UL (ref 0–0.1)
BASOPHILS NFR BLD AUTO: 1.2 %
BILIRUB DIRECT SERPL-MCNC: 0.1 MG/DL (ref 0–0.3)
BILIRUB SERPL-MCNC: 0.7 MG/DL (ref 0–1.2)
BUN SERPL-MCNC: 17 MG/DL (ref 6–23)
CALCIUM SERPL-MCNC: 9.8 MG/DL (ref 8.6–10.3)
CHLORIDE SERPL-SCNC: 101 MMOL/L (ref 98–107)
CO2 SERPL-SCNC: 31 MMOL/L (ref 21–32)
CORTIS SERPL-MCNC: 9.8 UG/DL (ref 2.5–20)
CREAT SERPL-MCNC: 0.85 MG/DL (ref 0.5–1.3)
EGFRCR SERPLBLD CKD-EPI 2021: >90 ML/MIN/1.73M*2
EOSINOPHIL # BLD AUTO: 0.14 X10*3/UL (ref 0–0.7)
EOSINOPHIL NFR BLD AUTO: 1.4 %
ERYTHROCYTE [DISTWIDTH] IN BLOOD BY AUTOMATED COUNT: 19.2 % (ref 11.5–14.5)
GLUCOSE SERPL-MCNC: 96 MG/DL (ref 74–99)
HCT VFR BLD AUTO: 42.3 % (ref 41–52)
HGB BLD-MCNC: 12.8 G/DL (ref 13.5–17.5)
IMM GRANULOCYTES # BLD AUTO: 0.08 X10*3/UL (ref 0–0.7)
IMM GRANULOCYTES NFR BLD AUTO: 0.8 % (ref 0–0.9)
LYMPHOCYTES # BLD AUTO: 2.39 X10*3/UL (ref 1.2–4.8)
LYMPHOCYTES NFR BLD AUTO: 23.8 %
MCH RBC QN AUTO: 18.8 PG (ref 26–34)
MCHC RBC AUTO-ENTMCNC: 30.3 G/DL (ref 32–36)
MCV RBC AUTO: 62 FL (ref 80–100)
MONOCYTES # BLD AUTO: 0.63 X10*3/UL (ref 0.1–1)
MONOCYTES NFR BLD AUTO: 6.3 %
NEUTROPHILS # BLD AUTO: 6.67 X10*3/UL (ref 1.2–7.7)
NEUTROPHILS NFR BLD AUTO: 66.5 %
NRBC BLD-RTO: 0 /100 WBCS (ref 0–0)
PLATELET # BLD AUTO: 471 X10*3/UL (ref 150–450)
POTASSIUM SERPL-SCNC: 4.5 MMOL/L (ref 3.5–5.3)
PROT SERPL-MCNC: 6.7 G/DL (ref 6.4–8.2)
RBC # BLD AUTO: 6.81 X10*6/UL (ref 4.5–5.9)
SODIUM SERPL-SCNC: 140 MMOL/L (ref 136–145)
WBC # BLD AUTO: 10 X10*3/UL (ref 4.4–11.3)

## 2025-01-24 PROCEDURE — 36415 COLL VENOUS BLD VENIPUNCTURE: CPT

## 2025-01-24 PROCEDURE — 80053 COMPREHEN METABOLIC PANEL: CPT

## 2025-01-24 PROCEDURE — 82306 VITAMIN D 25 HYDROXY: CPT

## 2025-01-24 PROCEDURE — 82533 TOTAL CORTISOL: CPT

## 2025-01-24 PROCEDURE — 85025 COMPLETE CBC W/AUTO DIFF WBC: CPT

## 2025-01-24 PROCEDURE — 82248 BILIRUBIN DIRECT: CPT

## 2025-01-24 PROCEDURE — 84402 ASSAY OF FREE TESTOSTERONE: CPT

## 2025-01-28 ENCOUNTER — OFFICE VISIT (OUTPATIENT)
Dept: PRIMARY CARE | Facility: CLINIC | Age: 70
End: 2025-01-28
Payer: MEDICARE

## 2025-01-28 VITALS
DIASTOLIC BLOOD PRESSURE: 80 MMHG | HEART RATE: 78 BPM | HEIGHT: 70 IN | BODY MASS INDEX: 30.35 KG/M2 | SYSTOLIC BLOOD PRESSURE: 138 MMHG | WEIGHT: 212 LBS | OXYGEN SATURATION: 99 %

## 2025-01-28 DIAGNOSIS — Z12.5 ENCOUNTER FOR PROSTATE CANCER SCREENING: ICD-10-CM

## 2025-01-28 DIAGNOSIS — D64.9 ANEMIA, UNSPECIFIED TYPE: ICD-10-CM

## 2025-01-28 DIAGNOSIS — M51.9 LUMBAR DISC DISEASE: ICD-10-CM

## 2025-01-28 DIAGNOSIS — E78.2 MIXED HYPERLIPIDEMIA: ICD-10-CM

## 2025-01-28 DIAGNOSIS — Z01.89 ENCOUNTER FOR ROUTINE LABORATORY TESTING: ICD-10-CM

## 2025-01-28 DIAGNOSIS — M51.9 THORACIC DISC DISEASE: ICD-10-CM

## 2025-01-28 DIAGNOSIS — Z00.00 ANNUAL PHYSICAL EXAM: ICD-10-CM

## 2025-01-28 DIAGNOSIS — K21.9 GASTROESOPHAGEAL REFLUX DISEASE WITHOUT ESOPHAGITIS: ICD-10-CM

## 2025-01-28 DIAGNOSIS — M15.0 PRIMARY OSTEOARTHRITIS INVOLVING MULTIPLE JOINTS: ICD-10-CM

## 2025-01-28 DIAGNOSIS — G63 POLYNEUROPATHY ASSOCIATED WITH UNDERLYING DISEASE (MULTI): Primary | ICD-10-CM

## 2025-01-28 DIAGNOSIS — E55.9 VITAMIN D DEFICIENCY: ICD-10-CM

## 2025-01-28 DIAGNOSIS — I10 PRIMARY HYPERTENSION: ICD-10-CM

## 2025-01-28 DIAGNOSIS — R53.82 CHRONIC FATIGUE: ICD-10-CM

## 2025-01-28 DIAGNOSIS — M50.90 CERVICAL DISC DISEASE: ICD-10-CM

## 2025-01-28 DIAGNOSIS — R73.01 IFG (IMPAIRED FASTING GLUCOSE): ICD-10-CM

## 2025-01-28 PROBLEM — K57.90 DIVERTICULOSIS: Status: ACTIVE | Noted: 2025-01-28

## 2025-01-28 PROCEDURE — 1159F MED LIST DOCD IN RCRD: CPT | Performed by: STUDENT IN AN ORGANIZED HEALTH CARE EDUCATION/TRAINING PROGRAM

## 2025-01-28 PROCEDURE — 3008F BODY MASS INDEX DOCD: CPT | Performed by: STUDENT IN AN ORGANIZED HEALTH CARE EDUCATION/TRAINING PROGRAM

## 2025-01-28 PROCEDURE — 3079F DIAST BP 80-89 MM HG: CPT | Performed by: STUDENT IN AN ORGANIZED HEALTH CARE EDUCATION/TRAINING PROGRAM

## 2025-01-28 PROCEDURE — 1126F AMNT PAIN NOTED NONE PRSNT: CPT | Performed by: STUDENT IN AN ORGANIZED HEALTH CARE EDUCATION/TRAINING PROGRAM

## 2025-01-28 PROCEDURE — 99214 OFFICE O/P EST MOD 30 MIN: CPT | Performed by: STUDENT IN AN ORGANIZED HEALTH CARE EDUCATION/TRAINING PROGRAM

## 2025-01-28 PROCEDURE — 4004F PT TOBACCO SCREEN RCVD TLK: CPT | Performed by: STUDENT IN AN ORGANIZED HEALTH CARE EDUCATION/TRAINING PROGRAM

## 2025-01-28 PROCEDURE — 3075F SYST BP GE 130 - 139MM HG: CPT | Performed by: STUDENT IN AN ORGANIZED HEALTH CARE EDUCATION/TRAINING PROGRAM

## 2025-01-28 PROCEDURE — 1160F RVW MEDS BY RX/DR IN RCRD: CPT | Performed by: STUDENT IN AN ORGANIZED HEALTH CARE EDUCATION/TRAINING PROGRAM

## 2025-01-28 PROCEDURE — G2211 COMPLEX E/M VISIT ADD ON: HCPCS | Performed by: STUDENT IN AN ORGANIZED HEALTH CARE EDUCATION/TRAINING PROGRAM

## 2025-01-28 ASSESSMENT — ENCOUNTER SYMPTOMS
GASTROINTESTINAL NEGATIVE: 1
CARDIOVASCULAR NEGATIVE: 1
RESPIRATORY NEGATIVE: 1
CONSTITUTIONAL NEGATIVE: 1

## 2025-01-28 ASSESSMENT — PAIN SCALES - GENERAL: PAINLEVEL_OUTOF10: 0-NO PAIN

## 2025-01-28 NOTE — PATIENT INSTRUCTIONS
- Overall, the patient feels well and denies any acute symptoms / concerns at this time.  - Blood pressure at goal today.  - Encouraged continued dietary, exercise, and lifestyle modification.  - Discussed the benefits associated with smoking cessation.  - Significant medication and problem list reconciliation done today.     - Labwork:   - Patient had labwork done for this appointment. Discussed today. Everything looked stable / great.  - Will order labwork for the patient's next appointment. Encouraged the patient to get this labwork done one week prior to the next appointment.

## 2025-01-28 NOTE — PROGRESS NOTES
[facility]: [facility]  PROGRESS NOTE      Contreras De La Cruz is a 69 y.o. male that is presenting today for Follow-up.    Assessment/Plan   - Overall, the patient feels well and denies any acute symptoms / concerns at this time.  - Blood pressure at goal today.  - Encouraged continued dietary, exercise, and lifestyle modification.  - Discussed the benefits associated with smoking cessation.  - Significant medication and problem list reconciliation done today.     - Labwork:   - Patient had labwork done for this appointment. Discussed today. Everything looked stable / great.  - Will order labwork for the patient's next appointment. Encouraged the patient to get this labwork done one week prior to the next appointment.    Diagnoses and all orders for this visit:  Polyneuropathy associated with underlying disease (Multi)  -     Follow Up In Primary Care  Gastroesophageal reflux disease without esophagitis  -     Follow Up In Primary Care  Primary osteoarthritis involving multiple joints  -     Follow Up In Primary Care  IFG (impaired fasting glucose)  -     Follow Up In Primary Care  -     Hemoglobin A1C; Future  Anemia, unspecified type  -     Follow Up In Primary Care  Mixed hyperlipidemia  -     Follow Up In Primary Care  -     Hepatic Function Panel; Future  -     Lipid Panel; Future  Primary hypertension  -     Follow Up In Primary Care  -     CBC and Auto Differential; Future  -     Basic Metabolic Panel; Future  -     TSH with reflex to Free T4 if abnormal; Future  Thoracic disc disease  -     Follow Up In Primary Care  Vitamin D deficiency  -     Follow Up In Primary Care  -     Vitamin D 25-Hydroxy,Total (for eval of Vitamin D levels); Future  Cervical disc disease  -     Follow Up In Primary Care  Lumbar disc disease  -     Follow Up In Primary Care  Chronic fatigue  -     Follow Up In Primary Care  Encounter for routine laboratory testing  Encounter for prostate cancer screening  -      Prostate Specific Antigen; Future  Annual physical exam  -     Follow Up In Primary Care; Future    Current Outpatient Medications   Medication Instructions    acetaminophen (Tylenol Extra Strength) 500 mg tablet 2 tablets, Every 8 hours PRN    atorvastatin (LIPITOR) 40 mg, oral, Daily    cholecalciferol (VITAMIN D3) 50 mcg, oral, Daily    DULoxetine (CYMBALTA) 60 mg, oral, Daily    esomeprazole (NEXIUM) 20 mg, oral, Daily    magnesium hydroxide (MAGNESIA ORAL) 500 mg    multivitamin tablet 1 tablet, oral, Daily     Subjective   - The patient otherwise feels well and denies any acute symptoms or concerns at this time.  - The patient denies any changes or progression of their chronic medical problems.  - The patient denies any problems or concerns with their medications.      Review of Systems   Constitutional: Negative.    Respiratory: Negative.     Cardiovascular: Negative.    Gastrointestinal: Negative.    All other systems reviewed and are negative.     Objective   Vitals:    01/28/25 1257   BP: 138/80   Pulse: 78   SpO2: 99%      Body mass index is 30.42 kg/m².  Physical Exam  Vitals and nursing note reviewed.   Constitutional:       General: He is not in acute distress.  Neck:      Vascular: No carotid bruit.   Cardiovascular:      Rate and Rhythm: Normal rate and regular rhythm.      Heart sounds: Normal heart sounds.   Pulmonary:      Effort: Pulmonary effort is normal.      Breath sounds: Normal breath sounds.   Musculoskeletal:         General: No swelling.   Neurological:      Mental Status: He is alert. Mental status is at baseline.   Psychiatric:         Mood and Affect: Mood normal.       Diagnostic Results   Lab Results   Component Value Date    GLUCOSE 96 01/24/2025    CALCIUM 9.8 01/24/2025     01/24/2025    K 4.5 01/24/2025    CO2 31 01/24/2025     01/24/2025    BUN 17 01/24/2025    CREATININE 0.85 01/24/2025     Lab Results   Component Value Date    ALT 36 01/24/2025    AST 20  "01/24/2025    ALKPHOS 56 01/24/2025    BILITOT 0.7 01/24/2025     Lab Results   Component Value Date    WBC 10.0 01/24/2025    HGB 12.8 (L) 01/24/2025    HCT 42.3 01/24/2025    MCV 62 (L) 01/24/2025     (H) 01/24/2025     Lab Results   Component Value Date    CHOL 163 07/26/2024    CHOL 169 06/20/2023    CHOL 183 05/19/2022     Lab Results   Component Value Date    HDL 39.0 (L) 07/26/2024    HDL 42 06/20/2023    HDL 48 05/19/2022     Lab Results   Component Value Date    LDLCALC 97 07/26/2024    LDLCALC 104 06/20/2023    LDLCALC 115 05/19/2022     Lab Results   Component Value Date    TRIG 133 07/26/2024    TRIG 116 06/20/2023    TRIG 98 05/19/2022     No components found for: \"CHOLHDL\"  Lab Results   Component Value Date    HGBA1C 5.6 07/26/2024     Other labs not included in the list above were reviewed either before or during this encounter.    History    Past Medical History:   Diagnosis Date    Anemia     Cervical disc disease     Chronic fatigue     Chronic pain     Class 1 obesity with body mass index (BMI) of 30.0 to 30.9 in adult 07/30/2024    GERD (gastroesophageal reflux disease)     HLD (hyperlipidemia)     Hypertension     Impaired fasting glucose     Lumbar disc disease     Polyneuropathy     Polyp of colon     Primary osteoarthritis involving multiple joints     Thoracic disc disease      Past Surgical History:   Procedure Laterality Date    APPENDECTOMY  04/21/2016    ARTHRODESIS WRIST W/ OR W/O BONE GRAFT Right 10/31/2014    BACK SURGERY  1988    BACK SURGERY  1998    CARPAL TUNNEL RELEASE Right 10/31/2014    INSERTION / REMOVAL EPIDURAL SPINAL NEUROSTIMULATOR  09/16/2010    LUMBAR INSERTION    NECK SURGERY  2003    REVISION / REMOVAL NEUROSTIMULATOR  02/24/2020    LUMBAR GENERATOR REPLACEMENT    VARICOSE VEIN SURGERY       Family History   Problem Relation Name Age of Onset    Heart failure Mother      Cancer Father       Social History     Socioeconomic History    Marital status:     "  Spouse name: Not on file    Number of children: Not on file    Years of education: Not on file    Highest education level: Not on file   Occupational History    Not on file   Tobacco Use    Smoking status: Former     Current packs/day: 0.00     Average packs/day: 1.3 packs/day for 20.0 years (25.0 ttl pk-yrs)     Types: Cigarettes     Start date:      Quit date:      Years since quittin.0    Smokeless tobacco: Current     Types: Chew   Vaping Use    Vaping status: Never Used   Substance and Sexual Activity    Alcohol use: Not Currently    Drug use: Yes     Frequency: 7.0 times per week     Types: Marijuana     Comment: THC 10 mg dailt    Sexual activity: Not on file   Other Topics Concern    Not on file   Social History Narrative    Not on file     Social Drivers of Health     Financial Resource Strain: Not on file   Food Insecurity: Not on file   Transportation Needs: Not on file   Physical Activity: Not on file   Stress: Not on file   Social Connections: Not on file   Intimate Partner Violence: Not on file   Housing Stability: Not on file     Allergies   Allergen Reactions    Iodinated Contrast Media Other    Gabapentin Hives    Niacin-Lovastatin Hives     Current Outpatient Medications on File Prior to Visit   Medication Sig Dispense Refill    acetaminophen (Tylenol Extra Strength) 500 mg tablet Take 2 tablets (1,000 mg) by mouth every 8 hours if needed. Tylenol 8 Hour 500 mg      atorvastatin (Lipitor) 40 mg tablet Take 1 tablet (40 mg) by mouth once daily. 90 tablet 3    cholecalciferol (Vitamin D3) 50 mcg (2,000 unit) capsule Take 1 capsule (50 mcg) by mouth once daily.      DULoxetine (Cymbalta) 60 mg DR capsule TAKE 1 CAPSULE BY MOUTH EVERY DAY 90 capsule 3    esomeprazole (NexIUM) 20 mg DR capsule Take 1 capsule (20 mg) by mouth once daily.      magnesium hydroxide (MAGNESIA ORAL) Take 500 mg by mouth.      multivitamin tablet Take 1 tablet by mouth once daily.       No current  facility-administered medications on file prior to visit.     Immunization History   Administered Date(s) Administered    COVID-19, mRNA, LNP-S, PF, 30 mcg/0.3 mL dose 03/17/2021, 11/22/2021    Flu vaccine, quadrivalent, high-dose, preservative free, age 65y+ (FLUZONE) 11/17/2021, 11/29/2022, 01/16/2024    Flu vaccine, trivalent, preservative free, HIGH-DOSE, age 65y+ (Fluzone) 11/17/2021, 11/29/2022, 08/31/2024    Influenza, Unspecified 11/29/2022    Influenza, injectable, quadrivalent 10/22/2018, 09/15/2020    Pfizer COVID-19 vaccine, 12 years and older, (30mcg/0.3mL) (Comirnaty) 01/24/2024, 08/31/2024    Pfizer COVID-19 vaccine, bivalent, age 12 years and older (30 mcg/0.3 mL) 09/07/2022, 06/30/2023    Pfizer Gray Cap SARS-CoV-2 04/06/2022    Pfizer Purple Cap SARS-CoV-2 04/07/2021, 04/16/2021, 04/28/2021    Pneumococcal conjugate vaccine, 20-valent (PREVNAR 20) 06/13/2023    RSV, 60 Years And Older (AREXVY) 01/24/2024    Tdap vaccine, age 7 year and older (BOOSTRIX, ADACEL) 11/11/2015, 05/16/2016    Zoster vaccine, recombinant, adult (SHINGRIX) 06/29/2023, 08/27/2023    Zoster, live 05/16/2016     Patient's medical history was reviewed and updated either before or during this encounter.       Bryan Esparza MD

## 2025-01-30 LAB
TESTOSTERONE FREE (CHAN): 70.1 PG/ML (ref 35–155)
TESTOSTERONE,TOTAL,LC-MS/MS: 302 NG/DL (ref 250–1100)

## 2025-03-17 DIAGNOSIS — E78.2 MIXED HYPERLIPIDEMIA: ICD-10-CM

## 2025-03-17 RX ORDER — ATORVASTATIN CALCIUM 40 MG/1
40 TABLET, FILM COATED ORAL DAILY
Qty: 90 TABLET | Refills: 3 | Status: SHIPPED | OUTPATIENT
Start: 2025-03-17

## 2025-03-17 NOTE — TELEPHONE ENCOUNTER
LV 1/28/25, NV 8/5/25    Atorvastatin 40 mg    [facility] [address] [place], [place] [address]or [place], [place]

## 2025-03-24 NOTE — PROGRESS NOTES
"[facility]  Follow Up Office Visit Note 3/25/2025    Patient Information: Contreras De La Cruz, MRN: [id number], : 1955   Primary Care/Referring Physician: Bryan Esparza MD, [address] [place] [address] / [place] [address]     Chief Complaint: Right low back/buttock pain  Interval History: He returns for follow up. At his last visit I made no changes    Today he reports doing slightly worse since last seen. He was doing well after his last right SI joint injection, but his pain flared after an incident where he was reaching forward and lifting and felt a \"pop\" in the low back around 1 month ago. He is currently having midline low back pain, near the lumbosacral junction, which radiates down towards his right groin. This pain worsens primary with walking and going up and down the steps    Brief History of Pain: Mr. Contreras De La Cruz is a 70 y.o. male with a PMHx of  HTN, HLD, GERD, tobacco use disorder who presents for evaluation of mid/low back, right leg, and tailbone pain.    For reference, he reports pain ongoing for many years, with recent exacerbation of low back and right leg pain. There was no obvious inciting event that caused this worsening pain. This pain worsens primarily with bending forward and lifting. He previously had an SCS in place for 10-15 years but had this removed in 2022 because he did not feel it was providing pain relief any longer. He was previously followed by Dr. Mcneal at [facility] for some time who was doing injections with benefit. Most recently he was followed by Dr. Herrera, who performed lumbar medial branch blocks with benefit, but the patient did not want to continue receiving injections since no sedation was given    Current Pain Medications: Uses THC gummies, Duloxetine 60 mg daily  Previously Tried Pain Medications: Tylenol - no benefit. Meloxicam, Celecoxib. He reports trying a number of medications for pain in the past but is not interested in restarting any of " them    Relevant Surgeries: Hx of lumbar spine surgery x2 (last in 1990's) as well as cervical spine surgery (2003). Intracept at L4, L5, S1 - 80% pain relief.   Injections: Caudal LIZZETTE - 80% pain relief. Right SI joint injection - 100% pain relief for 6 months. Knee steroid injections with minimal relief. Lumbar mbb's with only 30% relief. Reportedly had multiple other injections with Dr. Mcneal at [facility]   Physical/Occupational Therapy: Has done PT in the past but not recently    Medications:   Current Outpatient Medications   Medication Instructions    acetaminophen (Tylenol Extra Strength) 500 mg tablet 2 tablets, Every 8 hours PRN    atorvastatin (LIPITOR) 40 mg, oral, Daily    cholecalciferol (VITAMIN D3) 50 mcg, oral, Daily    DULoxetine (CYMBALTA) 60 mg, oral, Daily    esomeprazole (NEXIUM) 20 mg, oral, Daily    magnesium hydroxide (MAGNESIA ORAL) 500 mg    methylPREDNISolone (Medrol Dospak) 4 mg tablets Follow schedule on package instructions    multivitamin tablet 1 tablet, oral, Daily      Allergies:   Allergies   Allergen Reactions    Iodinated Contrast Media Other    Gabapentin Hives    Niacin-Lovastatin Hives       Past Medical & Surgical History:  Past Medical History:   Diagnosis Date    Anemia     Cervical disc disease     Chronic fatigue     Chronic pain     Class 1 obesity with body mass index (BMI) of 30.0 to 30.9 in adult 07/30/2024    GERD (gastroesophageal reflux disease)     HLD (hyperlipidemia)     Hypertension     Impaired fasting glucose     Lumbar disc disease     Polyneuropathy     Polyp of colon     Primary osteoarthritis involving multiple joints     Thoracic disc disease       Past Surgical History:   Procedure Laterality Date    APPENDECTOMY  04/21/2016    ARTHRODESIS WRIST W/ OR W/O BONE GRAFT Right 10/31/2014    BACK SURGERY  1988    BACK SURGERY  1998    CARPAL TUNNEL RELEASE Right 10/31/2014    INSERTION / REMOVAL EPIDURAL SPINAL NEUROSTIMULATOR  09/16/2010    LUMBAR INSERTION     NECK SURGERY  2003    REVISION / REMOVAL NEUROSTIMULATOR  2020    LUMBAR GENERATOR REPLACEMENT    VARICOSE VEIN SURGERY         Family History   Problem Relation Name Age of Onset    Heart failure Mother      Cancer Father       Social History     Socioeconomic History    Marital status:      Spouse name: Not on file    Number of children: Not on file    Years of education: Not on file    Highest education level: Not on file   Occupational History    Not on file   Tobacco Use    Smoking status: Former     Current packs/day: 0.00     Average packs/day: 1.3 packs/day for 20.0 years (25.0 ttl pk-yrs)     Types: Cigarettes     Start date:      Quit date:      Years since quittin.2    Smokeless tobacco: Current     Types: Chew   Vaping Use    Vaping status: Never Used   Substance and Sexual Activity    Alcohol use: Not Currently    Drug use: Yes     Frequency: 7.0 times per week     Types: Marijuana     Comment: THC 10 mg dailt    Sexual activity: Not on file   Other Topics Concern    Not on file   Social History Narrative    Not on file     Social Drivers of Health     Financial Resource Strain: Not on file   Food Insecurity: Not on file   Transportation Needs: Not on file   Physical Activity: Not on file   Stress: Not on file   Social Connections: Not on file   Intimate Partner Violence: Not on file   Housing Stability: Not on file       Problems, Past medical history, past surgical history, Medications, allergies, social and family history reviewed and as per the electronic medical record from today's encounter    Review of Systems:  CONST: No fever, chills, fatigue, weight changes  EYES: No loss of vision  ENT: No hearing loss, tinnitus  CV: No chest pain, palpitations  RESP: No dyspnea, shortness of breath, cough  GI: No stool incontinence, nausea, vomiting  : No urinary incontinence  MSK: No joint swelling  SKIN: No rash, no hives  NEURO: No headache, dizziness  PSYCH: No anxiety,  "depression  HEM/LYMPH: No easy bruising or bleeding    Physical Exam:  Vitals: /80   Pulse 74   SpO2 95%   General: No apparent distress. Alert, appropriate, oriented x 3. Mood generally positive, affect congruent. Speaking in full sentences.   HENT: Normocephalic, atraumatic. Hearing intact.  Eyes: Pupils equal and round  Neck: Supple, trachea midline  Lungs: Symmetric respiratory excursion on visual exam, nonlabored breathing.   Extremities: No cyanosis or edema noted in extremities.  Skin: No rashes, lesions noted.  MSK: Reports mild buttock pain with right hip scour maneuver. No pain with right hip FADIR maneuver  Back: Reports mild midline tenderness to palpation overlying lumbosacral junction. Reports mild pain to palpation overlying right posterior iliac crest. No tenderness to palpation overlying bilateral lumbar paraspinal muscles.   Neuro: Alert and appropriate. Gait within normal limits. Bulk and tone within normal limits.    Laboratory Data:  The following laboratory data were reviewed during this visit:   Lab Results   Component Value Date    WBC 10.0 01/24/2025    RBC 6.81 (H) 01/24/2025    HGB 12.8 (L) 01/24/2025    HCT 42.3 01/24/2025     (H) 01/24/2025      No results found for: \"INR\"  Lab Results   Component Value Date    CREATININE 0.85 01/24/2025    HGBA1C 5.6 07/26/2024       Imaging:  The following imaging impressions were reviewed by me during this visit:    - 8/17/23 lumbar spine MRI shows L4/5 moderate posterior disc osteophyte complex, postsurgical changes about the right rosalia lamina, moderate right foraminal narrowing. L5/S1 asymmetric left paracentral and foraminal disc bulge likely abutting the descedning left S1 nerve root, postsurgical changes status post left hemilaminectomy. Post gadolinium imaging demonstrates mild left lateral and posterolateral epidural enhancement. No nerve root enhancement demonstrated. Modic changes noted at L5 and S1, and more subtly at " L4.  -5/1/23 bilateral knee xray shows moderate OA  -8/11/23 thoracic spine CT shows T10-T11 right foraminal disc protrusion causing moderate right and no left foraminal stenosis or significant canal stenosis.    I also personally reviewed the images from the above studies myself. These images and my interpretation of them contributed to the management and decision making of the patient's medical plan.    ASSESSMENT:  Mr. Contreras De La Cruz is a 70 y.o. male with low back and right leg pain that is consistent with:    1. Postlaminectomy syndrome of lumbar region    2. Arthropathy of right sacroiliac joint    3. Right hip pain          PLAN:  Radiology: -No new diagnostics at this time. Would consider a right hip xray if his groin pain worsens    Physically: Given worsening of pain I recommend he restart physical therapy. Referral provided today    Psychologically: No needs at this time    Medication: No changes to his medications today. He reports undergoing numerous medication trials in the past and is not interested in adding any new medications. I did provide him a Medrol dosepak given current flare of his pain    Duration: Multiple years    Intervention: I suspect his low back/buttock pain is secondary to right sacroiliac joint arthropathy, lumbar radiculopathy and vertebrogenic low back pain. He has a fairly complex history of multiple back surgeries, multiple injections for pain, as well as SCS. He underwent Intracept at L4, L5, and S1 with 80% improvement in his axial back pain  - He has right leg pain that is likely radicular in nature. I performed a caudal LIZZETTE with 80% improvement in his right leg pain.   - He continues to have right buttock and groin pain which is most consistent with right sacroiliac joint arthropathy. He underwent a right SI joint injection with 100% pain relief for 6 months. This was repeated with 80% relief. Will monitor for duration of relief. We briefly discussed the possibility of SI  joint fusion in the future.               Sincerely,  Bartolome Huber MD  [facility] - [place]

## 2025-03-25 ENCOUNTER — OFFICE VISIT (OUTPATIENT)
Dept: PAIN MEDICINE | Facility: CLINIC | Age: 70
End: 2025-03-25
Payer: MEDICARE

## 2025-03-25 VITALS — SYSTOLIC BLOOD PRESSURE: 132 MMHG | DIASTOLIC BLOOD PRESSURE: 80 MMHG | HEART RATE: 74 BPM | OXYGEN SATURATION: 95 %

## 2025-03-25 DIAGNOSIS — M47.818 ARTHROPATHY OF RIGHT SACROILIAC JOINT: ICD-10-CM

## 2025-03-25 DIAGNOSIS — M96.1 POSTLAMINECTOMY SYNDROME OF LUMBAR REGION: Primary | ICD-10-CM

## 2025-03-25 DIAGNOSIS — M25.551 RIGHT HIP PAIN: ICD-10-CM

## 2025-03-25 PROCEDURE — 99214 OFFICE O/P EST MOD 30 MIN: CPT | Performed by: STUDENT IN AN ORGANIZED HEALTH CARE EDUCATION/TRAINING PROGRAM

## 2025-03-25 PROCEDURE — 1160F RVW MEDS BY RX/DR IN RCRD: CPT | Performed by: STUDENT IN AN ORGANIZED HEALTH CARE EDUCATION/TRAINING PROGRAM

## 2025-03-25 PROCEDURE — 3079F DIAST BP 80-89 MM HG: CPT | Performed by: STUDENT IN AN ORGANIZED HEALTH CARE EDUCATION/TRAINING PROGRAM

## 2025-03-25 PROCEDURE — 1159F MED LIST DOCD IN RCRD: CPT | Performed by: STUDENT IN AN ORGANIZED HEALTH CARE EDUCATION/TRAINING PROGRAM

## 2025-03-25 PROCEDURE — G2211 COMPLEX E/M VISIT ADD ON: HCPCS | Performed by: STUDENT IN AN ORGANIZED HEALTH CARE EDUCATION/TRAINING PROGRAM

## 2025-03-25 PROCEDURE — 4004F PT TOBACCO SCREEN RCVD TLK: CPT | Performed by: STUDENT IN AN ORGANIZED HEALTH CARE EDUCATION/TRAINING PROGRAM

## 2025-03-25 PROCEDURE — 3075F SYST BP GE 130 - 139MM HG: CPT | Performed by: STUDENT IN AN ORGANIZED HEALTH CARE EDUCATION/TRAINING PROGRAM

## 2025-03-25 RX ORDER — METHYLPREDNISOLONE 4 MG/1
TABLET ORAL
Qty: 21 TABLET | Refills: 0 | Status: SHIPPED | OUTPATIENT
Start: 2025-03-25 | End: 2025-03-31

## 2025-03-25 ASSESSMENT — PAIN - FUNCTIONAL ASSESSMENT: PAIN_FUNCTIONAL_ASSESSMENT: 0-10

## 2025-03-25 ASSESSMENT — PAIN DESCRIPTION - DESCRIPTORS: DESCRIPTORS: SPASM;SORE

## 2025-03-25 ASSESSMENT — ENCOUNTER SYMPTOMS
DEPRESSION: 0
OCCASIONAL FEELINGS OF UNSTEADINESS: 0
LOSS OF SENSATION IN FEET: 0

## 2025-03-25 ASSESSMENT — PAIN SCALES - GENERAL
PAINLEVEL_OUTOF10: 5
PAINLEVEL_OUTOF10: 5 - MODERATE PAIN

## 2025-03-27 ENCOUNTER — APPOINTMENT (OUTPATIENT)
Dept: GASTROENTEROLOGY | Facility: CLINIC | Age: 70
End: 2025-03-27
Payer: MEDICARE

## 2025-04-17 ENCOUNTER — TELEPHONE (OUTPATIENT)
Dept: PAIN MEDICINE | Facility: CLINIC | Age: 70
End: 2025-04-17
Payer: MEDICARE

## 2025-04-17 NOTE — TELEPHONE ENCOUNTER
Pt calling stating he is having Right leg pain that's causing him not to be able to walk. Pt was given a medrol elizabeth that helped. Pt asking what should he do? Pt last seen 3/25/2025.

## 2025-04-22 ENCOUNTER — OFFICE VISIT (OUTPATIENT)
Dept: PRIMARY CARE | Facility: CLINIC | Age: 70
End: 2025-04-22
Payer: MEDICARE

## 2025-04-22 VITALS
SYSTOLIC BLOOD PRESSURE: 134 MMHG | HEART RATE: 80 BPM | TEMPERATURE: 96.3 F | HEIGHT: 70 IN | OXYGEN SATURATION: 98 % | WEIGHT: 217 LBS | BODY MASS INDEX: 31.07 KG/M2 | DIASTOLIC BLOOD PRESSURE: 82 MMHG

## 2025-04-22 DIAGNOSIS — R53.82 CHRONIC FATIGUE: ICD-10-CM

## 2025-04-22 DIAGNOSIS — Z98.1 HISTORY OF FUSION OF LUMBAR SPINE: ICD-10-CM

## 2025-04-22 DIAGNOSIS — K21.9 GASTROESOPHAGEAL REFLUX DISEASE WITHOUT ESOPHAGITIS: ICD-10-CM

## 2025-04-22 DIAGNOSIS — G63 POLYNEUROPATHY ASSOCIATED WITH UNDERLYING DISEASE: ICD-10-CM

## 2025-04-22 DIAGNOSIS — M15.0 PRIMARY OSTEOARTHRITIS INVOLVING MULTIPLE JOINTS: Primary | ICD-10-CM

## 2025-04-22 DIAGNOSIS — Z98.1 HISTORY OF FUSION OF CERVICAL SPINE: ICD-10-CM

## 2025-04-22 DIAGNOSIS — M96.1 POSTLAMINECTOMY SYNDROME: ICD-10-CM

## 2025-04-22 DIAGNOSIS — I10 PRIMARY HYPERTENSION: ICD-10-CM

## 2025-04-22 DIAGNOSIS — Z98.1 HISTORY OF FUSION OF THORACIC SPINE: ICD-10-CM

## 2025-04-22 PROCEDURE — 3008F BODY MASS INDEX DOCD: CPT | Performed by: STUDENT IN AN ORGANIZED HEALTH CARE EDUCATION/TRAINING PROGRAM

## 2025-04-22 PROCEDURE — 1159F MED LIST DOCD IN RCRD: CPT | Performed by: STUDENT IN AN ORGANIZED HEALTH CARE EDUCATION/TRAINING PROGRAM

## 2025-04-22 PROCEDURE — 1125F AMNT PAIN NOTED PAIN PRSNT: CPT | Performed by: STUDENT IN AN ORGANIZED HEALTH CARE EDUCATION/TRAINING PROGRAM

## 2025-04-22 PROCEDURE — 3079F DIAST BP 80-89 MM HG: CPT | Performed by: STUDENT IN AN ORGANIZED HEALTH CARE EDUCATION/TRAINING PROGRAM

## 2025-04-22 PROCEDURE — 4004F PT TOBACCO SCREEN RCVD TLK: CPT | Performed by: STUDENT IN AN ORGANIZED HEALTH CARE EDUCATION/TRAINING PROGRAM

## 2025-04-22 PROCEDURE — 1160F RVW MEDS BY RX/DR IN RCRD: CPT | Performed by: STUDENT IN AN ORGANIZED HEALTH CARE EDUCATION/TRAINING PROGRAM

## 2025-04-22 PROCEDURE — G2211 COMPLEX E/M VISIT ADD ON: HCPCS | Performed by: STUDENT IN AN ORGANIZED HEALTH CARE EDUCATION/TRAINING PROGRAM

## 2025-04-22 PROCEDURE — 99214 OFFICE O/P EST MOD 30 MIN: CPT | Performed by: STUDENT IN AN ORGANIZED HEALTH CARE EDUCATION/TRAINING PROGRAM

## 2025-04-22 PROCEDURE — 3075F SYST BP GE 130 - 139MM HG: CPT | Performed by: STUDENT IN AN ORGANIZED HEALTH CARE EDUCATION/TRAINING PROGRAM

## 2025-04-22 RX ORDER — MELOXICAM 7.5 MG/1
7.5 TABLET ORAL DAILY
Qty: 30 TABLET | Refills: 1 | Status: SHIPPED | OUTPATIENT
Start: 2025-04-22 | End: 2025-06-21

## 2025-04-22 RX ORDER — ACETAMINOPHEN 500 MG
500-1000 TABLET ORAL 3 TIMES DAILY PRN
Status: SHIPPED | COMMUNITY
Start: 2025-04-22

## 2025-04-22 RX ORDER — METHYLPREDNISOLONE 4 MG/1
TABLET ORAL
Qty: 21 TABLET | Refills: 0 | Status: SHIPPED | OUTPATIENT
Start: 2025-04-22 | End: 2025-04-28

## 2025-04-22 RX ORDER — DICLOFENAC SODIUM 10 MG/G
4 GEL TOPICAL 3 TIMES DAILY PRN
COMMUNITY
Start: 2025-04-22

## 2025-04-22 ASSESSMENT — ENCOUNTER SYMPTOMS
RESPIRATORY NEGATIVE: 1
GASTROINTESTINAL NEGATIVE: 1
CONSTITUTIONAL NEGATIVE: 1
CARDIOVASCULAR NEGATIVE: 1

## 2025-04-22 ASSESSMENT — PAIN SCALES - GENERAL: PAINLEVEL_OUTOF10: 5

## 2025-04-22 NOTE — PROGRESS NOTES
[facility]: [facility]  PROGRESS NOTE      Contreras De La Cruz is a 70 y.o. male that is presenting today for Follow-up.    Assessment/Plan   - Patient dealing with acute / chronic back pain. Trial meloxicam as a daily NSAID since ibuprofen helps a bit. Will do another steroid pack since the patient notes that this helps as well. Patient is already scheduled with physical therapy (starts tomorrow).   - Blood pressure at goal today.  - Encouraged continued dietary, exercise, and lifestyle modification.  - Significant medication and problem list reconciliation done today.     Diagnoses and all orders for this visit:  Primary osteoarthritis involving multiple joints  -     acetaminophen (Tylenol Extra Strength) 500 mg tablet; Take 1-2 tablets (500-1,000 mg) by mouth 3 times a day as needed for mild pain (1 - 3), moderate pain (4 - 6) or fever (temp greater than 38.0 C).  -     diclofenac sodium (Voltaren) 1 % gel; Apply 4.5 inches (4 g) topically 3 times a day as needed (pain).  -     meloxicam (Mobic) 7.5 mg tablet; Take 1 tablet (7.5 mg) by mouth once daily.  -     methylPREDNISolone (Medrol Dospak) 4 mg tablets; Take as directed on package.  Gastroesophageal reflux disease without esophagitis  Polyneuropathy associated with underlying disease  -     acetaminophen (Tylenol Extra Strength) 500 mg tablet; Take 1-2 tablets (500-1,000 mg) by mouth 3 times a day as needed for mild pain (1 - 3), moderate pain (4 - 6) or fever (temp greater than 38.0 C).  -     diclofenac sodium (Voltaren) 1 % gel; Apply 4.5 inches (4 g) topically 3 times a day as needed (pain).  -     meloxicam (Mobic) 7.5 mg tablet; Take 1 tablet (7.5 mg) by mouth once daily.  -     methylPREDNISolone (Medrol Dospak) 4 mg tablets; Take as directed on package.  History of fusion of thoracic spine  -     acetaminophen (Tylenol Extra Strength) 500 mg tablet; Take 1-2 tablets (500-1,000 mg) by mouth 3 times a day as needed for mild pain (1  - 3), moderate pain (4 - 6) or fever (temp greater than 38.0 C).  -     diclofenac sodium (Voltaren) 1 % gel; Apply 4.5 inches (4 g) topically 3 times a day as needed (pain).  -     meloxicam (Mobic) 7.5 mg tablet; Take 1 tablet (7.5 mg) by mouth once daily.  -     methylPREDNISolone (Medrol Dospak) 4 mg tablets; Take as directed on package.  History of fusion of cervical spine  -     acetaminophen (Tylenol Extra Strength) 500 mg tablet; Take 1-2 tablets (500-1,000 mg) by mouth 3 times a day as needed for mild pain (1 - 3), moderate pain (4 - 6) or fever (temp greater than 38.0 C).  -     diclofenac sodium (Voltaren) 1 % gel; Apply 4.5 inches (4 g) topically 3 times a day as needed (pain).  -     meloxicam (Mobic) 7.5 mg tablet; Take 1 tablet (7.5 mg) by mouth once daily.  -     methylPREDNISolone (Medrol Dospak) 4 mg tablets; Take as directed on package.  History of fusion of lumbar spine  -     acetaminophen (Tylenol Extra Strength) 500 mg tablet; Take 1-2 tablets (500-1,000 mg) by mouth 3 times a day as needed for mild pain (1 - 3), moderate pain (4 - 6) or fever (temp greater than 38.0 C).  -     diclofenac sodium (Voltaren) 1 % gel; Apply 4.5 inches (4 g) topically 3 times a day as needed (pain).  -     meloxicam (Mobic) 7.5 mg tablet; Take 1 tablet (7.5 mg) by mouth once daily.  -     methylPREDNISolone (Medrol Dospak) 4 mg tablets; Take as directed on package.  Postlaminectomy syndrome  -     acetaminophen (Tylenol Extra Strength) 500 mg tablet; Take 1-2 tablets (500-1,000 mg) by mouth 3 times a day as needed for mild pain (1 - 3), moderate pain (4 - 6) or fever (temp greater than 38.0 C).  -     diclofenac sodium (Voltaren) 1 % gel; Apply 4.5 inches (4 g) topically 3 times a day as needed (pain).  -     meloxicam (Mobic) 7.5 mg tablet; Take 1 tablet (7.5 mg) by mouth once daily.  -     methylPREDNISolone (Medrol Dospak) 4 mg tablets; Take as directed on package.  Primary hypertension  Chronic  fatigue    Current Outpatient Medications   Medication Instructions    acetaminophen (TYLENOL EXTRA STRENGTH) 500-1,000 mg, oral, 3 times daily PRN    atorvastatin (LIPITOR) 40 mg, oral, Daily    cholecalciferol (VITAMIN D3) 50 mcg, oral, Daily    diclofenac sodium (VOLTAREN) 4 g, Topical, 3 times daily PRN    DULoxetine (CYMBALTA) 60 mg, oral, Daily    esomeprazole (NEXIUM) 20 mg, oral, Daily    magnesium hydroxide (MAGNESIA ORAL) 500 mg    meloxicam (MOBIC) 7.5 mg, oral, Daily    methylPREDNISolone (Medrol Dospak) 4 mg tablets Take as directed on package.    multivitamin tablet 1 tablet, oral, Daily     Subjective   - The patient otherwise feels well and denies any acute symptoms or concerns at this time.  - The patient denies any changes or progression of their chronic medical problems.  - The patient denies any problems or concerns with their medications.      Review of Systems   Constitutional: Negative.    Respiratory: Negative.     Cardiovascular: Negative.    Gastrointestinal: Negative.    All other systems reviewed and are negative.     Objective   Vitals:    04/22/25 1409   BP: 134/82   Pulse: 80   Temp: 35.7 °C (96.3 °F)   SpO2: 98%      Body mass index is 31.14 kg/m².  Physical Exam  Vitals and nursing note reviewed.   Constitutional:       General: He is not in acute distress.  Neck:      Vascular: No carotid bruit.   Cardiovascular:      Rate and Rhythm: Normal rate and regular rhythm.      Heart sounds: Normal heart sounds.   Pulmonary:      Effort: Pulmonary effort is normal.      Breath sounds: Normal breath sounds.   Musculoskeletal:         General: No swelling.   Neurological:      Mental Status: He is alert. Mental status is at baseline.   Psychiatric:         Mood and Affect: Mood normal.       Diagnostic Results   Lab Results   Component Value Date    GLUCOSE 96 01/24/2025    CALCIUM 9.8 01/24/2025     01/24/2025    K 4.5 01/24/2025    CO2 31 01/24/2025     01/24/2025    BUN 17  "2025    CREATININE 0.85 2025     Lab Results   Component Value Date    ALT 36 2025    AST 20 2025    ALKPHOS 56 2025    BILITOT 0.7 2025     Lab Results   Component Value Date    WBC 10.0 2025    HGB 12.8 (L) 2025    HCT 42.3 2025    MCV 62 (L) 2025     (H) 2025     Lab Results   Component Value Date    CHOL 163 2024    CHOL 169 2023    CHOL 183 2022     Lab Results   Component Value Date    HDL 39.0 (L) 2024    HDL 42 2023    HDL 48 2022     Lab Results   Component Value Date    LDLCALC 97 2024    LDLCALC 104 2023    LDLCALC 115 2022     Lab Results   Component Value Date    TRIG 133 2024    TRIG 116 2023    TRIG 98 2022     No components found for: \"CHOLHDL\"  Lab Results   Component Value Date    HGBA1C 5.6 2024     Other labs not included in the list above were reviewed either before or during this encounter.    History    Medical History[1]  Surgical History[2]  Family History[3]  Social History     Socioeconomic History    Marital status:      Spouse name: Not on file    Number of children: Not on file    Years of education: Not on file    Highest education level: Not on file   Occupational History    Not on file   Tobacco Use    Smoking status: Former     Current packs/day: 0.00     Average packs/day: 1.3 packs/day for 20.0 years (25.0 ttl pk-yrs)     Types: Cigarettes     Start date:      Quit date:      Years since quittin.3    Smokeless tobacco: Current     Types: Chew   Vaping Use    Vaping status: Never Used   Substance and Sexual Activity    Alcohol use: Not Currently    Drug use: Yes     Frequency: 7.0 times per week     Types: Marijuana     Comment: THC 10 mg dailt    Sexual activity: Not on file   Other Topics Concern    Not on file   Social History Narrative    Not on file     Social Drivers of Health     Financial Resource Strain: " Not on file   Food Insecurity: Not on file   Transportation Needs: Not on file   Physical Activity: Not on file   Stress: Not on file   Social Connections: Not on file   Intimate Partner Violence: Not on file   Housing Stability: Not on file     Allergies[4]  Medications Ordered Prior to Encounter[5]  Immunization History   Administered Date(s) Administered    COVID-19, mRNA, LNP-S, PF, 30 mcg/0.3 mL dose 03/17/2021, 11/22/2021    Flu vaccine, quadrivalent, high-dose, preservative free, age 65y+ (FLUZONE) 11/17/2021, 11/29/2022, 01/16/2024    Flu vaccine, trivalent, preservative free, HIGH-DOSE, age 65y+ (Fluzone) 11/17/2021, 11/29/2022, 08/31/2024    Influenza, Unspecified 11/29/2022    Influenza, injectable, quadrivalent 10/22/2018, 09/15/2020    Pfizer COVID-19 vaccine, 12 years and older, (30mcg/0.3mL) (Comirnaty) 01/24/2024, 08/31/2024    Pfizer COVID-19 vaccine, bivalent, age 12 years and older (30 mcg/0.3 mL) 09/07/2022, 06/30/2023    Pfizer Gray Cap SARS-CoV-2 04/06/2022    Pfizer Purple Cap SARS-CoV-2 04/07/2021, 04/16/2021, 04/28/2021    Pneumococcal conjugate vaccine, 20-valent (PREVNAR 20) 06/13/2023    RSV, 60 Years And Older (AREXVY) 01/24/2024    Tdap vaccine, age 7 year and older (BOOSTRIX, ADACEL) 11/11/2015, 05/16/2016    Zoster vaccine, recombinant, adult (SHINGRIX) 06/29/2023, 08/27/2023    Zoster, live 05/16/2016     Patient's medical history was reviewed and updated either before or during this encounter.       Bryan Esparza MD       [1]   Past Medical History:  Diagnosis Date    Anemia     Cervical disc disease     Chronic fatigue     Chronic pain     Class 1 obesity with body mass index (BMI) of 30.0 to 30.9 in adult 07/30/2024    GERD (gastroesophageal reflux disease)     HLD (hyperlipidemia)     Hypertension     Impaired fasting glucose     Lumbar disc disease     Polyneuropathy     Polyp of colon     Primary osteoarthritis involving multiple joints     Thoracic disc disease    [2]    Past Surgical History:  Procedure Laterality Date    APPENDECTOMY  04/21/2016    ARTHRODESIS WRIST W/ OR W/O BONE GRAFT Right 10/31/2014    BACK SURGERY  1988    BACK SURGERY  1998    INSERTION / REMOVAL EPIDURAL SPINAL NEUROSTIMULATOR  09/16/2010    LUMBAR INSERTION    LEG SURGERY Right 1980    plate put in broke leg and shin due to an accident    NECK SURGERY  2003    REVISION / REMOVAL NEUROSTIMULATOR  02/24/2020    LUMBAR GENERATOR REPLACEMENT    VARICOSE VEIN SURGERY      WRIST SURGERY     [3]   Family History  Problem Relation Name Age of Onset    Heart failure Mother      Cancer Father     [4]   Allergies  Allergen Reactions    Iodinated Contrast Media Other    Gabapentin Hives    Iodine Nausea/vomiting     iodine    Niacin-Lovastatin Hives   [5]   Current Outpatient Medications on File Prior to Visit   Medication Sig Dispense Refill    atorvastatin (Lipitor) 40 mg tablet Take 1 tablet (40 mg) by mouth once daily. 90 tablet 3    cholecalciferol (Vitamin D3) 50 mcg (2,000 unit) capsule Take 1 capsule (50 mcg) by mouth once daily.      DULoxetine (Cymbalta) 60 mg DR capsule TAKE 1 CAPSULE BY MOUTH EVERY DAY 90 capsule 3    esomeprazole (NexIUM) 20 mg DR capsule Take 1 capsule (20 mg) by mouth once daily.      magnesium hydroxide (MAGNESIA ORAL) Take 500 mg by mouth.      multivitamin tablet Take 1 tablet by mouth once daily.      [DISCONTINUED] acetaminophen (Tylenol Extra Strength) 500 mg tablet Take 2 tablets (1,000 mg) by mouth every 8 hours if needed. Tylenol 8 Hour 500 mg       No current facility-administered medications on file prior to visit.

## 2025-04-22 NOTE — PROGRESS NOTES
"    Physical Therapy Evaluation    Patient Name: Contreras De La Cruz \"NORA\"  MRN: [id number]  Evaluation Date: 4/23/2025  Time Calculation  Start Time: 1148  Stop Time: 1228  Time Calculation (min): 40 min  PT Evaluation Time Entry  PT Evaluation (Low) Time Entry: 28     PT Therapeutic Procedures Time Entry  Therapeutic Exercise Time Entry: 12    Problem List Items Addressed This Visit    None  Visit Diagnoses         Codes      Postlaminectomy syndrome of lumbar region    -  Primary M96.1    Relevant Orders    Follow Up In Physical Therapy      Arthropathy of right sacroiliac joint     M47.818    Relevant Orders    Follow Up In Physical Therapy      Right hip pain     M25.551    Relevant Orders    Follow Up In Physical Therapy            Subjective   Patient reported hx of condition: Pt reports 30+ year hx of low back pain in an injury where he broke his leg and herniated 3 discs. Pt had multiple lumbar laminectomies in the 1990s, and then had a spinal cord stimulator for 10+ years until 2022. Pain has been relatively constant over the years but recently has been worsening in intensity. He has had multiple injections which provide temporary relief but symptoms return. He was recently on a steroid pack which helped. Pain worsens with standing, walking, lifting, and bending. Pain can improve with seated rest. He has done some PT in the past with mixed results, has not done any recently.     Precautions:  Precautions  Precautions Comment: Hx lumbar laminectomy    Relevant PMH:  Hx B lumbar surgeries 1990s, hx spinal cord stimulator s/p removal 2022, hx cervical fusion    Red Flags: Do you have any of the following? Yes  Fever/chills, unexplained weight changes, dizziness/fainting, unexplained change in bowel or bladder functions, unexplained malaise or muscle weakness, night pain/sweats, numbness or tingling    Pain:  Pain Assessment: 0-10  0-10 (Numeric) Pain Score: 5 - Moderate pain (Pain at max 10/10)  Pain Type: Chronic " pain  Pain Location: Back  Pain Orientation: Lower  Pain Radiating Towards: Radiates into R buttocks and lateral hip  Pain Frequency: Constant/continuous  Effect of Pain on Daily Activities: Pain present with ADLs and can limit standing, walking, lifting, stairs, household tasks    Home Living:  Home type: House  Stairs: Yes  Lives with: Spouse  Occupation: retired  Hobbies/activities: gardening    Prior Function Per Pt/Caregiver Report:  Prior Function Comments: Functionally independent with progressive increase in pain over recent years    Patient's Goal for Therapy:  Reduce pain, improve mobility, be able to garden     OBJECTIVE:  Objective   Posture:  Posture Comment: Flexed trunk, forward head and rounded shoulders  Range of Motion:  Lumbar ROM Range   Flexion 50% limited, tight    Extension 75% limited, painful    R rotation 50% limited   L rotation 50% limited   R sidebend 50% limited   L sidebend 50% limited     Strength:  LE MMT L R   Hip flexion 5/5 4/5   Hip extension 4+/5 4/5   Hip abduction 4+/5 4/5   Hip adduction 4+/5 4/5   Knee flexion 5/5 4/5   Knee extension 5/5 4/5     Flexibility:  Flexibility Comment: Tight B piriformis, hamstrings, gastrocs, hip flexors  Palpation:  Palpation Comment: TTP R piriformis, hip flexors, SIJ  Special Tests:  Special Tests Comment: Negative repeated movements, mild pain increase with slump and SLR  Gait:  Gait Comment: flexed posture, decreased RLE stance time. Distances limited by pain  Stairs:  Stairs Comment: Performs reciprocally with pain  Bed Mobility:  Bed Mobility Comment: Independent with pain  Transfers:  Transfers Comment: Independent with pain  Other:  Comment: Sensation intact    Outcome Measures:  Other Measures  5x Sit to Stand: 20 (no UE support. Pain in back and knees)  Oswestry Disablity Index (LIZZIE): 14     Assessment  PT Assessment Results: Decreased strength, Decreased range of motion, Decreased mobility, Pain  Rehab Prognosis: Good    Pt is a 70  y.o. male who presents with impairments listed above. These impairments have led to functional limitations including pain during ADLs and limited tolerance of standing, walking, lifting, stairs, and other functional tasks. Pt would benefit from skilled physical therapy intervention to improve above impairments and facilitate return to function.    Complexity of Evaluation: Low    Based on the history including personal factors and/or comorbidities, examination of body systems including body structures and function, activity limitations, and/or participation restrictions, as well as clinical presentation, patient meets criteria for above complexity evaluation.    Plan  Treatment/Interventions: Manual therapy, Neuromuscular re-education, Taping techniques, Therapeutic activities, Therapeutic exercises, Ultrasound, Aquatic therapy, Mechanical traction  PT Plan: Skilled PT  PT Frequency: 2 times per week  Duration: 10 visits  Certification Period Start Date: 04/23/25  Certification Period End Date: 07/22/25  Number of Treatments Authorized: MN  Rehab Potential: Good  Plan of Care Agreement: Patient    Insurance Plan: Payor: [employer] MEDICARE / Plan: [employer] MEDICARE ADVANTAGE / Product Type: *No Product type* /     Plan for next visit: assess response to HEP, address ROM, strength, and core stability deficits, manual/modalities PRN    OP EDUCATION:  Outpatient Education  Individual(s) Educated: Patient  Education Provided: Body Mechanics, Anatomy, Home Exercise Program, POC, Posture  Diagnosis and Precautions: R sided low back pain  Risk and Benefits Discussed with Patient/Caregiver/Other: yes  Patient/Caregiver Demonstrated Understanding: yes  Plan of Care Discussed and Agreed Upon: yes  Patient Response to Education: Patient/Caregiver Performed Return Demonstration of Exercises/Activities, Patient/Caregiver Verbalized Understanding of Information, Patient/Caregiver Asked Appropriate Questions    Today's  Treatment:  Therapeutic Exercise  HEP issued, demonstrated, instructed pt in exercises, exercises include:  Access Code: [email or web address]  URL: [email or web address]  Date: 04/23/2025  Prepared by: Osbaldo Licona    Exercises  - Hooklying Single Knee to Chest Stretch with Towel  - 1 x daily - 7 x weekly - 1 sets - 3 reps - 30 sec hold  - Supine Posterior Pelvic Tilt  - 1 x daily - 7 x weekly - 2 sets - 10 reps - 3 second hold  - Supine Hip Adduction Isometric with Ball  - 1 x daily - 7 x weekly - 2 sets - 10 reps - 3 hold  - Hooklying Clamshell with Resistance  - 1 x daily - 7 x weekly - 2 sets - 10 reps  - Supine Lower Trunk Rotation  - 1 x daily - 7 x weekly - 2 sets - 10 reps  - Seated Hamstring Stretch  - 1 x daily - 7 x weekly - 1 sets - 3 reps - 30 second hold    Goals:  Active       PT Problem       STG, 5 visits       Start:  04/23/25    Expected End:  06/07/25       Pt will be independent in HEP to improve LE and core strength, mobility, and function.  Pt will report 50% reduction in pain with functional mobility such as standing, walking, and stairs.         Pt will increase strength in core and LEs by 1/2 MMT in all planes for improved performance of functional mobility.       Start:  04/23/25    Expected End:  07/22/25            Pt will demonstrate no more than 25% limitation in lumbar AROM without pain in all planes for improved performance of ADLs.       Start:  04/23/25    Expected End:  07/22/25            Pt will ambulate community distances across all surfaces without deviation and without pain.       Start:  04/23/25    Expected End:  07/22/25            Pt will ascend/descend 2 flights of stairs reciprocally without deviation and without pain for improved household and community mobility.       Start:  04/23/25    Expected End:  07/22/25            Pt will demonstrate adequate strength and mobility to complete household tasks such as gardening and yardwork without pain or limitation.       Start:   04/23/25    Expected End:  07/22/25            Pt will perform 5x sit to  14s without pain for improved functional strength and mobility.       Start:  04/23/25    Expected End:  07/22/25

## 2025-04-23 ENCOUNTER — EVALUATION (OUTPATIENT)
Dept: PHYSICAL THERAPY | Facility: CLINIC | Age: 70
End: 2025-04-23
Payer: MEDICARE

## 2025-04-23 DIAGNOSIS — M25.551 RIGHT HIP PAIN: ICD-10-CM

## 2025-04-23 DIAGNOSIS — M96.1 POSTLAMINECTOMY SYNDROME OF LUMBAR REGION: Primary | ICD-10-CM

## 2025-04-23 DIAGNOSIS — M47.818 ARTHROPATHY OF RIGHT SACROILIAC JOINT: ICD-10-CM

## 2025-04-23 PROCEDURE — 97161 PT EVAL LOW COMPLEX 20 MIN: CPT | Mod: GP

## 2025-04-23 PROCEDURE — 97110 THERAPEUTIC EXERCISES: CPT | Mod: GP

## 2025-04-23 ASSESSMENT — PAIN SCALES - GENERAL: PAINLEVEL_OUTOF10: 5 - MODERATE PAIN

## 2025-04-23 ASSESSMENT — PAIN - FUNCTIONAL ASSESSMENT: PAIN_FUNCTIONAL_ASSESSMENT: 0-10

## 2025-04-29 NOTE — PROGRESS NOTES
"    Physical Therapy Treatment    Patient Name: Contreras De La Cruz  MRN: [id number]  Encounter date:  4/30/2025  Time Calculation  Start Time: 1518  Stop Time: 1558  Time Calculation (min): 40 min     PT Therapeutic Procedures Time Entry  Therapeutic Exercise Time Entry: 40       Visit Number:  2 (including evaluation)  Planned total visits: 10 visits  Visits Authorized/Insurance Coverage:  NO AUTH, 400 DED- MET, 100% COVERAGE, [place], 400 OOP-MET, [employer]     Current Problem  Problem List Items Addressed This Visit    None  Visit Diagnoses         Codes      Postlaminectomy syndrome of lumbar region     M96.1      Arthropathy of right sacroiliac joint     M47.818      Right hip pain     M25.551            Precautions  Precautions  Precautions Comment: Hx lumbar laminectomy    Pain  Pain Assessment: 0-10    Subjective  Back still somewhat painful since finishing steroid pack. Despite this, HEP has been helping to maange symptoms. He has been able to walk around the block without stopping, but does have pain and some limitation after.     Objective  Mild flexed posture ambulating throughout clinic    Treatment:  Therapeutic Exercise  Therapeutic Exercise Performed: Yes  NuStep L1 x 5'    On table:   DKTC green ball x15  LTR 2x10 green ball  DL bridge green ball 2x10   SKTC 3x30\" ea  DL bridge with iso hip add x10   DL bridge with iso hip abd yellow loop x10  Figure 4 piriformis stretch 3x30\"     Standing:  DL heel raise x10  Hip abduction x10   Pallof press x10 ea 10#  MoFlex calf stretch 2x30\"    Current HEP:  Access Code: [email or web address]  URL: [email or web address]  Date: 04/23/2025  Prepared by: Osbaldo Licona    Exercises  - Hooklying Single Knee to Chest Stretch with Towel  - 1 x daily - 7 x weekly - 1 sets - 3 reps - 30 sec hold  - Supine Posterior Pelvic Tilt  - 1 x daily - 7 x weekly - 2 sets - 10 reps - 3 second hold  - Supine Hip Adduction Isometric with Ball  - 1 x daily - 7 x weekly - 2 sets - 10 reps - 3 hold  - " Hooklying Clamshell with Resistance  - 1 x daily - 7 x weekly - 2 sets - 10 reps  - Supine Lower Trunk Rotation  - 1 x daily - 7 x weekly - 2 sets - 10 reps  - Seated Hamstring Stretch  - 1 x daily - 7 x weekly - 1 sets - 3 reps - 30 second hold    Has patient been consistent with performance of HEP? Yes    Assessment:  Pt's response to treatment:  Core stabilization progressed to reduce pain with functional tasks. Pt felt some fatigue and intermittent SI pain during, but overall had good pain reduction within visit.   Areas of improvements:  walking tolerance  Limitations/deficits:  posture     Pain end of session: 0/10    Plan:  OP PT Plan  Treatment/Interventions: Manual therapy, Neuromuscular re-education, Taping techniques, Therapeutic activities, Therapeutic exercises, Ultrasound, Aquatic therapy, Mechanical traction  PT Plan: Skilled PT  PT Frequency: 2 times per week  Duration: 10 visits  Certification Period Start Date: 04/23/25  Certification Period End Date: 07/22/25  Number of Treatments Authorized: MN  Rehab Potential: Good  Plan of Care Agreement: Patient  Continue with current POC with the following changes progress core exercises, add postural strengthening as able     Assessment of current progress against goals:  Progressing toward functional goals    Goals:  Active       PT Problem       STG, 5 visits       Start:  04/23/25    Expected End:  06/07/25       Pt will be independent in HEP to improve LE and core strength, mobility, and function.  Pt will report 50% reduction in pain with functional mobility such as standing, walking, and stairs.         Pt will increase strength in core and LEs by 1/2 MMT in all planes for improved performance of functional mobility.       Start:  04/23/25    Expected End:  07/22/25            Pt will demonstrate no more than 25% limitation in lumbar AROM without pain in all planes for improved performance of ADLs.       Start:  04/23/25    Expected End:  07/22/25             Pt will ambulate community distances across all surfaces without deviation and without pain.       Start:  04/23/25    Expected End:  07/22/25            Pt will ascend/descend 2 flights of stairs reciprocally without deviation and without pain for improved household and community mobility.       Start:  04/23/25    Expected End:  07/22/25            Pt will demonstrate adequate strength and mobility to complete household tasks such as gardening and yardwork without pain or limitation.       Start:  04/23/25    Expected End:  07/22/25            Pt will perform 5x sit to  14s without pain for improved functional strength and mobility.       Start:  04/23/25    Expected End:  07/22/25

## 2025-04-30 ENCOUNTER — TREATMENT (OUTPATIENT)
Dept: PHYSICAL THERAPY | Facility: CLINIC | Age: 70
End: 2025-04-30
Payer: MEDICARE

## 2025-04-30 DIAGNOSIS — M96.1 POSTLAMINECTOMY SYNDROME OF LUMBAR REGION: ICD-10-CM

## 2025-04-30 DIAGNOSIS — M47.818 ARTHROPATHY OF RIGHT SACROILIAC JOINT: ICD-10-CM

## 2025-04-30 DIAGNOSIS — M25.551 RIGHT HIP PAIN: ICD-10-CM

## 2025-04-30 PROCEDURE — 97110 THERAPEUTIC EXERCISES: CPT | Mod: GP

## 2025-04-30 ASSESSMENT — PAIN - FUNCTIONAL ASSESSMENT: PAIN_FUNCTIONAL_ASSESSMENT: 0-10

## 2025-05-07 NOTE — PROGRESS NOTES
"    Physical Therapy Treatment    Patient Name: Contreras De La Cruz  MRN: [id number]  Encounter date:  5/9/2025  Time Calculation  Start Time: 1400  Stop Time: 1445  Time Calculation (min): 45 min     PT Therapeutic Procedures Time Entry  Therapeutic Exercise Time Entry: 40       Visit Number:  3 (including evaluation)  Planned total visits: 10  Visits Authorized/Insurance Coverage:  NO AUTH, 400 DED- MET, 100% [place], 400 OOP-MET, ANTHEM     Current Problem  Problem List Items Addressed This Visit    None  Visit Diagnoses         Codes      Postlaminectomy syndrome of lumbar region     M96.1      Arthropathy of right sacroiliac joint     M47.818      Right hip pain     M25.551          Precautions  Hx lumbar laminectomy     Pain  2/10     Subjective  \"Today it's not really bothering me today.\" Patient reports a main complaint of R hip and adductor region pain     Back still somewhat painful since finishing steroid pack. Despite this, HEP has been helping to maange symptoms. He has been able to walk around the block without stopping, but does have pain and some limitation after.     Objective  Mild forward rounded posture when walking.     Treatment:    NuStep L1 x 5'    On table:   DKTC green ball x 10, 10\" holds   LTR 2x10 green ball  DL bridge green ball 2 x 10  short L side LBP   Hamstring stretch 3 x 30\" ea  DL bridge with iso hip add x10   DL bridge with iso hip abd yellow loop x 10  Figure 4 adductor stretch 3 x 30\" L/R  Supine piriformis stretch 3 x 30\" L/R    Standing:  DL heel raise x10  Hip abduction x10   Pallof press x 10 ea 10#  Stir the pot x 10 ea 10#    MoFlex calf stretch 2x30\" [facility] 5/9    Current HEP:  Access Code: [email or web address]  URL: [email or web address]  Date: 04/23/2025  Prepared by: Osbaldo Licona    Exercises  - Hooklying Single Knee to Chest Stretch with Towel  - 1 x daily - 7 x weekly - 1 sets - 3 reps - 30 sec hold  - Supine Posterior Pelvic Tilt  - 1 x daily - 7 x weekly - 2 sets - 10 " reps - 3 second hold  - Supine Hip Adduction Isometric with Ball  - 1 x daily - 7 x weekly - 2 sets - 10 reps - 3 hold  - Hooklying Clamshell with Resistance  - 1 x daily - 7 x weekly - 2 sets - 10 reps  - Supine Lower Trunk Rotation  - 1 x daily - 7 x weekly - 2 sets - 10 reps  - Seated Hamstring Stretch  - 1 x daily - 7 x weekly - 1 sets - 3 reps - 30 second hold    Has patient been consistent with performance of HEP? Yes    Assessment:  Pt's response to treatment: Supine hamstring stretch w/ stretch strap performed because pt denied stretch on SKTC, good response. Core stabilization progressed by introducing new dynamic activity to reduce pain with functional tasks. Cueing provided as needed to improve exercise performance, good follow through. Patient had short duration increased left lower back pain after bridges on stability ball that resolved in L side semi-reclined position leaning on LUE. Pt reported overall decreased pain, but increased radicular symptoms in B feet.      Areas of improvements: Decreased LBP   Limitations/deficits: Decreased posture muscle/core weakness    Pain end of session: 0/10    Plan:     Continue with current POC with the following changes progress core exercises, add postural strengthening as able     Assessment of current progress against goals:  Progressing toward functional goals    Goals:  Active       PT Problem       STG, 5 visits       Start:  04/23/25    Expected End:  06/07/25       Pt will be independent in HEP to improve LE and core strength, mobility, and function.  Pt will report 50% reduction in pain with functional mobility such as standing, walking, and stairs.         Pt will increase strength in core and LEs by 1/2 MMT in all planes for improved performance of functional mobility.       Start:  04/23/25    Expected End:  07/22/25            Pt will demonstrate no more than 25% limitation in lumbar AROM without pain in all planes for improved performance of ADLs.        Start:  04/23/25    Expected End:  07/22/25            Pt will ambulate community distances across all surfaces without deviation and without pain.       Start:  04/23/25    Expected End:  07/22/25            Pt will ascend/descend 2 flights of stairs reciprocally without deviation and without pain for improved household and community mobility.       Start:  04/23/25    Expected End:  07/22/25            Pt will demonstrate adequate strength and mobility to complete household tasks such as gardening and yardwork without pain or limitation.       Start:  04/23/25    Expected End:  07/22/25            Pt will perform 5x sit to  14s without pain for improved functional strength and mobility.       Start:  04/23/25    Expected End:  07/22/25

## 2025-05-09 ENCOUNTER — TREATMENT (OUTPATIENT)
Dept: PHYSICAL THERAPY | Facility: CLINIC | Age: 70
End: 2025-05-09
Payer: MEDICARE

## 2025-05-09 DIAGNOSIS — M47.818 ARTHROPATHY OF RIGHT SACROILIAC JOINT: ICD-10-CM

## 2025-05-09 DIAGNOSIS — M25.551 RIGHT HIP PAIN: ICD-10-CM

## 2025-05-09 DIAGNOSIS — M96.1 POSTLAMINECTOMY SYNDROME OF LUMBAR REGION: ICD-10-CM

## 2025-05-09 PROCEDURE — 97110 THERAPEUTIC EXERCISES: CPT | Mod: CQ,GP | Performed by: SPECIALIST/TECHNOLOGIST

## 2025-05-10 NOTE — PROGRESS NOTES
"    Physical Therapy Treatment    Patient Name: Contreras De La Cruz  MRN: [id number]  Encounter date:  5/13/2025  Time Calculation  Start Time: 1402  Stop Time: 1444  Time Calculation (min): 42 min     PT Therapeutic Procedures Time Entry  Therapeutic Exercise Time Entry: 40       Visit Number:  4 (including evaluation)  Planned total visits: 10  Visits Authorized/Insurance Coverage:  NO AUTH, 400 DED- MET, 100% [place], 400 OOP-MET, ANTHEM     Current Problem  Problem List Items Addressed This Visit    None  Visit Diagnoses         Codes      Postlaminectomy syndrome of lumbar region     M96.1      Arthropathy of right sacroiliac joint     M47.818      Right hip pain     M25.551          Precautions  Hx lumbar laminectomy     Pain  <5/10     Subjective    Patient reports continued pain in his posterior and anterior R hip pain, pointing to the area of the R piriformis and R hip, and stating it does not feel like a muscle pain. Patient states he has been compliant with his HEP, noting the exercises and stretches have been beneficial. Pt further elaborates he regularly walks approximately a mile for recreation. Patient stated he will have to start lifting heavy things to work in his garden.      Objective    Improved BLE flexibility during stretches.     Treatment:    NuStep L1 x 6', seated 9, LE only     On table:   DKTC green ball x 10, 10\" holds   LTR 2x10 green ball  DL bridge green ball 2 x 10, 5\" hold  short L side LBP     DL bridge with iso hip add x 10, 5\"   DL bridge with iso hip abd yellow loop x 10  Figure 4 adductor stretch 3 x 30\" L/R  Supine piriformis stretch 3 x 30\" L/R -short duration low L back \"spasm\"    Seated:  Hamstring stretch 3 x 30\" ea    Standing: w/ abdominal bracing  DL heel raise x10  Hip abduction x10   Pallof press x 20 ea 15#  Stir the pot x 20 ea 15#    MoFlex calf stretch 2x30\" DNP 5/13    Current HEP:  Access Code: [email or web address]  URL: [email or web address]  Date: 04/23/2025  Prepared " "by: Osbaldo Licona    Exercises  - Hooklying Single Knee to Chest Stretch with Towel  - 1 x daily - 7 x weekly - 1 sets - 3 reps - 30 sec hold  - Supine Posterior Pelvic Tilt  - 1 x daily - 7 x weekly - 2 sets - 10 reps - 3 second hold  - Supine Hip Adduction Isometric with Ball  - 1 x daily - 7 x weekly - 2 sets - 10 reps - 3 hold  - Hooklying Clamshell with Resistance  - 1 x daily - 7 x weekly - 2 sets - 10 reps  - Supine Lower Trunk Rotation  - 1 x daily - 7 x weekly - 2 sets - 10 reps  - Seated Hamstring Stretch  - 1 x daily - 7 x weekly - 1 sets - 3 reps - 30 second hold    Has patient been consistent with performance of HEP? Yes    Assessment:  Pt's response to treatment: Good response to seated hamstring stretch.  Core stabilization progressed by introducing new dynamic activity to reduce low back pain with functional tasks. Minimal cueing needed after initial instruction as needed, good follow through. Short duration lower left spasm during supine . Pt reported the supine stretches were beneficial, but he noted \"I'll be sore later\". Patient was advised to brace his core and posture muscles and utilize proper lifting technique to decrease strain on his low back if he has to lift heavy objects for yard work. Pt verbalized understanding.     Areas of improvements: Decreased LBP with stretches. Improved performance of bridges and standing exercises.  Limitations/deficits: Decreased posture muscle/core weakness    Pain end of session: 0/10,     Plan:     Continue with current POC with the following changes progress core exercises, add postural strengthening as able     Assessment of current progress against goals:  Progressing toward functional goals    Goals:  Active       PT Problem       STG, 5 visits       Start:  04/23/25    Expected End:  06/07/25       Pt will be independent in HEP to improve LE and core strength, mobility, and function.  Pt will report 50% reduction in pain with functional mobility such as " standing, walking, and stairs.         Pt will increase strength in core and LEs by 1/2 MMT in all planes for improved performance of functional mobility.       Start:  04/23/25    Expected End:  07/22/25            Pt will demonstrate no more than 25% limitation in lumbar AROM without pain in all planes for improved performance of ADLs.       Start:  04/23/25    Expected End:  07/22/25            Pt will ambulate community distances across all surfaces without deviation and without pain.       Start:  04/23/25    Expected End:  07/22/25            Pt will ascend/descend 2 flights of stairs reciprocally without deviation and without pain for improved household and community mobility.       Start:  04/23/25    Expected End:  07/22/25            Pt will demonstrate adequate strength and mobility to complete household tasks such as gardening and yardwork without pain or limitation.       Start:  04/23/25    Expected End:  07/22/25            Pt will perform 5x sit to  14s without pain for improved functional strength and mobility.       Start:  04/23/25    Expected End:  07/22/25

## 2025-05-13 ENCOUNTER — TREATMENT (OUTPATIENT)
Dept: PHYSICAL THERAPY | Facility: CLINIC | Age: 70
End: 2025-05-13
Payer: MEDICARE

## 2025-05-13 DIAGNOSIS — M96.1 POSTLAMINECTOMY SYNDROME OF LUMBAR REGION: ICD-10-CM

## 2025-05-13 DIAGNOSIS — M25.551 RIGHT HIP PAIN: ICD-10-CM

## 2025-05-13 DIAGNOSIS — M47.818 ARTHROPATHY OF RIGHT SACROILIAC JOINT: ICD-10-CM

## 2025-05-13 PROCEDURE — 97110 THERAPEUTIC EXERCISES: CPT | Mod: CQ,GP | Performed by: SPECIALIST/TECHNOLOGIST

## 2025-05-14 NOTE — PROGRESS NOTES
"    Physical Therapy Treatment    Patient Name: Contreras De La Cruz  MRN: [id number]  Encounter date:  5/15/2025  Time Calculation  Start Time: 1315  Stop Time: 1359  Time Calculation (min): 44 min     PT Therapeutic Procedures Time Entry  Therapeutic Exercise Time Entry: 40       Visit Number:  5 (including evaluation)  Planned total visits: 10  Visits Authorized/Insurance Coverage:  NO AUTH, 400 DED- MET, [place]% [place], 400 OOP-MET, ANTHEM     Current Problem  Problem List Items Addressed This Visit    None  Visit Diagnoses         Codes      Postlaminectomy syndrome of lumbar region     M96.1      Arthropathy of right sacroiliac joint     M47.818      Right hip pain     M25.551          Precautions  Hx lumbar laminectomy     Pain  0/10     Subjective  Patient reports decreased low back pain coming into the appointment, \"I haven't felt any bad pain\". Patient noted he had increased back pain during yard work, but relays it reduced with performance of HEP stretches. Patient relayed he has been more aware of bracing his core prior to lifting heavy objects.     Objective    Improved glut and core activation on bridges, BLE on stability ball and w/ adductor/abductors, increased hip clearance from LMT. Improved abdominal bracing with lifting activities.     Treatment:  Recumbent bike L1 x 6', seats 13      On table:   DKTC green ball x 10, 10\" holds   LTR 2x10 green ball  DL bridge green ball 2 x 10, 5\" hold      DL bridge with iso hip add x 10, 5\"   DL bridge with iso hip abd yellow loop x 10    Figure 4 adductor stretch 3 x 30\" L/R DNP  Supine piriformis stretch 3 x 30\" L/R -short duration low L back \"spasm\"    Seated:  Hamstring stretch 3 x 30\" ea    Standing: w/ abdominal bracing  Sit to stands x 10, w/ 5#  dumbbell at chest level x 10  DL heel raise x10  Hip abduction x10   Pallof press x 20 ea 15#  Stir the pot x 20 ea 15#  Weight pick ups from table x 10, 10# (5 pounds/ hand)    MoFlex calf stretch 2x30\" DNP " 5/13    Current HEP:  Access Code: [email or web address]  URL: [email or web address]  Date: 04/23/2025  Prepared by: Osbaldo Licona    Exercises  - Hooklying Single Knee to Chest Stretch with Towel  - 1 x daily - 7 x weekly - 1 sets - 3 reps - 30 sec hold  - Supine Posterior Pelvic Tilt  - 1 x daily - 7 x weekly - 2 sets - 10 reps - 3 second hold  - Supine Hip Adduction Isometric with Ball  - 1 x daily - 7 x weekly - 2 sets - 10 reps - 3 hold  - Hooklying Clamshell with Resistance  - 1 x daily - 7 x weekly - 2 sets - 10 reps  - Supine Lower Trunk Rotation  - 1 x daily - 7 x weekly - 2 sets - 10 reps  - Seated Hamstring Stretch  - 1 x daily - 7 x weekly - 1 sets - 3 reps - 30 second hold    Has patient been consistent with performance of HEP? Yes    Assessment:  Pt's response to treatment: Good response to exercises and stretches. Cueing provided to decrease bouncing during seated hamstring stretches and improve abdominal bracing with activity. Mild increased low back pain w/ goblet squats from LMT. Mild forward lean during stir-the-pot and paloff press, decreased w/ cueing.   Good response to weight pickups from table emphasizing abdominal bracing.      Areas of improvements: Decreased LBP with stretches. Continued improved abdominal activation during activity.  Limitations/deficits: Decreased posture muscle/core weakness.     Pain end of session: 1/10,     Plan:     Continue with current POC with the following changes progress core exercises, add postural strengthening as able     Assessment of current progress against goals:  Progressing toward functional goals    Goals:  Active       PT Problem       STG, 5 visits       Start:  04/23/25    Expected End:  06/07/25       Pt will be independent in HEP to improve LE and core strength, mobility, and function.  Pt will report 50% reduction in pain with functional mobility such as standing, walking, and stairs.         Pt will increase strength in core and LEs by 1/2 MMT in  all planes for improved performance of functional mobility.       Start:  04/23/25    Expected End:  07/22/25            Pt will demonstrate no more than 25% limitation in lumbar AROM without pain in all planes for improved performance of ADLs.       Start:  04/23/25    Expected End:  07/22/25            Pt will ambulate community distances across all surfaces without deviation and without pain.       Start:  04/23/25    Expected End:  07/22/25            Pt will ascend/descend 2 flights of stairs reciprocally without deviation and without pain for improved household and community mobility.       Start:  04/23/25    Expected End:  07/22/25            Pt will demonstrate adequate strength and mobility to complete household tasks such as gardening and yardwork without pain or limitation.       Start:  04/23/25    Expected End:  07/22/25            Pt will perform 5x sit to  14s without pain for improved functional strength and mobility.       Start:  04/23/25    Expected End:  07/22/25

## 2025-05-15 ENCOUNTER — TREATMENT (OUTPATIENT)
Dept: PHYSICAL THERAPY | Facility: CLINIC | Age: 70
End: 2025-05-15
Payer: MEDICARE

## 2025-05-15 DIAGNOSIS — M25.551 RIGHT HIP PAIN: ICD-10-CM

## 2025-05-15 DIAGNOSIS — M96.1 POSTLAMINECTOMY SYNDROME OF LUMBAR REGION: ICD-10-CM

## 2025-05-15 DIAGNOSIS — M47.818 ARTHROPATHY OF RIGHT SACROILIAC JOINT: ICD-10-CM

## 2025-05-15 PROCEDURE — 97110 THERAPEUTIC EXERCISES: CPT | Mod: CQ,GP | Performed by: SPECIALIST/TECHNOLOGIST

## 2025-05-19 NOTE — PROGRESS NOTES
"    Physical Therapy Treatment    Patient Name: Contreras De La Cruz  MRN: [id number]  Encounter date:  5/20/2025  Time Calculation  Start Time: 1315  Stop Time: 1400  Time Calculation (min): 45 min     PT Therapeutic Procedures Time Entry  Therapeutic Exercise Time Entry: 40     Visit Number:  6 (including evaluation)  Planned total visits: 10  Visits Authorized/Insurance Coverage:  NO AUTH, 400 DED- MET, 100% [place], 400 OOP-MET, ANTHEM     Current Problem  Problem List Items Addressed This Visit    None  Visit Diagnoses         Codes      Postlaminectomy syndrome of lumbar region     M96.1      Arthropathy of right sacroiliac joint     M47.818      Right hip pain     M25.551            Precautions  Hx lumbar laminectomy     Pain  0/10     Subjective   \"I'm feeling pretty good today.\"  Patient denies low back pain, but relays continued paraesthesia in B feet.     Objective    Improved glut and core activation on bridges, BLE on stability ball and w/ adductor/abductors, increased hip clearance from LMT. Improved abdominal bracing with lifting activities.     Treatment:  Recumbent bike L1 x 6', seats 13      On table:   DKTC green ball x 10, 10\" holds   LTR 2x10 green ball  DL bridge green ball 2 x 10, 5\" hold      DL bridge with iso hip add x 10, 5\"   DL bridge with iso hip abd red loop x 10    Supine piriformis stretch 3 x 30\" L/R -short duration low L back \"spasm\"  BLE nerve flosses 2 x 10 L/R    Seated:  Hamstring stretch 3 x 30\" ea    Standing: w/ abdominal bracing  Sit to stands x 10, w/ 5#  dumbbell at chest level   DL heel raise x10 DNP 5/20  Hip abduction x10 DNP 5/20   Pallof press x 20 ea 15#  Stir the pot x 20 ea 15#  Weight pick ups from table x 10, 10# kettle bell    MoFlex calf stretch 2x30\" DNP 5/13    Current HEP:  Access Code: [email or web address]  URL: [email or web address]  Date: 04/23/2025  Prepared by: Osbaldo Licona    Exercises  - Hooklying Single Knee to Chest Stretch with Towel  - 1 x daily - 7 x " weekly - 1 sets - 3 reps - 30 sec hold  - Supine Posterior Pelvic Tilt  - 1 x daily - 7 x weekly - 2 sets - 10 reps - 3 second hold  - Supine Hip Adduction Isometric with Ball  - 1 x daily - 7 x weekly - 2 sets - 10 reps - 3 hold  - Hooklying Clamshell with Resistance  - 1 x daily - 7 x weekly - 2 sets - 10 reps  - Supine Lower Trunk Rotation  - 1 x daily - 7 x weekly - 2 sets - 10 reps  - Seated Hamstring Stretch  - 1 x daily - 7 x weekly - 1 sets - 3 reps - 30 second hold    Has patient been consistent with performance of HEP? Yes    Assessment:  Pt's response to treatment: Good response to exercises and stretches. Mild increased low back pain w/ goblet squats from LMT. Minimal cueing again provided to decrease mild forward lean with stir the pot.    No increase in LBP or BLE radiculopathy with therex.     Areas of improvements: Decreased LBP with stretches. Improved BLE strength. Continued improvement of abdominal bracing w/ lifting and sit to stands w/ weight held at chest.   Limitations/deficits: Decreased posture muscle/core strength. Increased LBP may decrease ADL endurance    Pain end of session: 0/10      Plan:     Continue with current POC with the following changes progress core exercises, add postural strengthening as able     Assessment of current progress against goals:  Progressing toward functional goals    Goals:  Active       PT Problem       STG, 5 visits       Start:  04/23/25    Expected End:  06/07/25       Pt will be independent in HEP to improve LE and core strength, mobility, and function.  Pt will report 50% reduction in pain with functional mobility such as standing, walking, and stairs.         Pt will increase strength in core and LEs by 1/2 MMT in all planes for improved performance of functional mobility.       Start:  04/23/25    Expected End:  07/22/25            Pt will demonstrate no more than 25% limitation in lumbar AROM without pain in all planes for improved performance of  ADLs.       Start:  04/23/25    Expected End:  07/22/25            Pt will ambulate community distances across all surfaces without deviation and without pain.       Start:  04/23/25    Expected End:  07/22/25            Pt will ascend/descend 2 flights of stairs reciprocally without deviation and without pain for improved household and community mobility.       Start:  04/23/25    Expected End:  07/22/25            Pt will demonstrate adequate strength and mobility to complete household tasks such as gardening and yardwork without pain or limitation.       Start:  04/23/25    Expected End:  07/22/25            Pt will perform 5x sit to  14s without pain for improved functional strength and mobility.       Start:  04/23/25    Expected End:  07/22/25

## 2025-05-20 ENCOUNTER — TREATMENT (OUTPATIENT)
Dept: PHYSICAL THERAPY | Facility: CLINIC | Age: 70
End: 2025-05-20
Payer: MEDICARE

## 2025-05-20 DIAGNOSIS — M47.818 ARTHROPATHY OF RIGHT SACROILIAC JOINT: ICD-10-CM

## 2025-05-20 DIAGNOSIS — M96.1 POSTLAMINECTOMY SYNDROME OF LUMBAR REGION: ICD-10-CM

## 2025-05-20 DIAGNOSIS — M25.551 RIGHT HIP PAIN: ICD-10-CM

## 2025-05-20 PROCEDURE — 97110 THERAPEUTIC EXERCISES: CPT | Mod: CQ,GP | Performed by: SPECIALIST/TECHNOLOGIST

## 2025-05-20 NOTE — PROGRESS NOTES
"    Physical Therapy Treatment    Patient Name: Contreras De La Cruz  MRN: [id number]  Encounter date:  5/22/2025  Time Calculation  Start Time: 1400  Stop Time: 1445  Time Calculation (min): 45 min     PT Therapeutic Procedures Time Entry  Manual Therapy Time Entry: 10  Therapeutic Exercise Time Entry: 30     Visit Number:  7 (including evaluation)  Planned total visits: 10  Visits Authorized/Insurance Coverage:  NO AUTH, 400 DED- MET, 100% [place], 400 OOP-MET, ANTHEM     Current Problem  Problem List Items Addressed This Visit    None  Visit Diagnoses         Codes      Postlaminectomy syndrome of lumbar region     M96.1      Arthropathy of right sacroiliac joint     M47.818      Right hip pain     M25.551          Precautions  Hx lumbar laminectomy     Pain  5/10 SI joint     Subjective     Patient reported decreased pain near his R SI joint into the R adductors this morning that progressively got worse as the day progressed. Patient notes he took pain medication this morning to reduce the pain, but did not re-dose prior to the appointment to evaluate effect of exercise on his pain.    Objective   TTP in posterior R hip/SI joint with palpation during STM.     Treatment:  Manual: STM to posterior R hip near R SI joint, Pat L side lying.     Recumbent bike L1 x 6', seats 13      On table:   DKTC green ball x 10, 10\" holds DNP  LTR 2x10 green ball DNP  DL bridge green ball 2 x 10,  hold    DL bridge with iso hip add x 20, 5\"   DL bridge with iso hip abd red loop x 20    SI joint self adjustment w/ dowel andres, 5 x 5\", alt hip extension/flexionn    Supine piriformis stretch 3 x 30\" L/R   BLE nerve flosses 2 x 10 L/R    Seated:  Hamstring stretch 3 x 30\" ea    Standing: w/ abdominal bracing  Sit to stands x 10, w/ 5#  dumbbell at chest level DNP   DL heel raise x10 DNP 5/22  Hip abduction x10 DNP 5/22   Pallof press x 20 ea 15# DNP 5/22  Stir the pot x 20 ea 15# DNP 5/22  Weight pick ups from table x 10, 10# kettle " "bell    MoFlex calf stretch 2x30\" DNP 5/13    Current HEP:  Access Code: [email or web address]  URL: [email or web address]  Date: 04/23/2025  Prepared by: Osbaldo Licona    Exercises  - Hooklying Single Knee to Chest Stretch with Towel  - 1 x daily - 7 x weekly - 1 sets - 3 reps - 30 sec hold  - Supine Posterior Pelvic Tilt  - 1 x daily - 7 x weekly - 2 sets - 10 reps - 3 second hold  - Supine Hip Adduction Isometric with Ball  - 1 x daily - 7 x weekly - 2 sets - 10 reps - 3 hold  - Hooklying Clamshell with Resistance  - 1 x daily - 7 x weekly - 2 sets - 10 reps  - Supine Lower Trunk Rotation  - 1 x daily - 7 x weekly - 2 sets - 10 reps  - Seated Hamstring Stretch  - 1 x daily - 7 x weekly - 1 sets - 3 reps - 30 second hold    Has patient been consistent with performance of HEP? Yes    Assessment:  Pt's response to treatment: STM performed to attempt to relieve reported R side low back pain, minimal effect. Cueing provided for instruction of SI joint adjustment w/ dowel, no immediate change in SI joint discomfort. Good response to BLE stretches, decreased R side low back discomfort upon standing from supine. No increase in LBP with weight/kettlebell pickups from table. Pt was advised he could attempt the SI joint adjustment at home if it has beneficial, pt verbalized understanding.     Areas of improvements: Decreased LBP following stretches. Continued improvement of abdominal bracing w/ weight pickups.   Limitations/deficits: Decreased posture muscle/core strength. Increased LBP may decrease ADL endurance    Pain end of session: 1/10      Plan:     Continue with current POC with the following changes progress core exercises, add postural strengthening as able     Assessment of current progress against goals:  Progressing toward functional goals    Goals:  Active       PT Problem       STG, 5 visits       Start:  04/23/25    Expected End:  06/07/25       Pt will be independent in HEP to improve LE and core strength, " mobility, and function.  Pt will report 50% reduction in pain with functional mobility such as standing, walking, and stairs.         Pt will increase strength in core and LEs by 1/2 MMT in all planes for improved performance of functional mobility.       Start:  04/23/25    Expected End:  07/22/25            Pt will demonstrate no more than 25% limitation in lumbar AROM without pain in all planes for improved performance of ADLs.       Start:  04/23/25    Expected End:  07/22/25            Pt will ambulate community distances across all surfaces without deviation and without pain.       Start:  04/23/25    Expected End:  07/22/25            Pt will ascend/descend 2 flights of stairs reciprocally without deviation and without pain for improved household and community mobility.       Start:  04/23/25    Expected End:  07/22/25            Pt will demonstrate adequate strength and mobility to complete household tasks such as gardening and yardwork without pain or limitation.       Start:  04/23/25    Expected End:  07/22/25            Pt will perform 5x sit to  14s without pain for improved functional strength and mobility.       Start:  04/23/25    Expected End:  07/22/25

## 2025-05-22 ENCOUNTER — TREATMENT (OUTPATIENT)
Dept: PHYSICAL THERAPY | Facility: CLINIC | Age: 70
End: 2025-05-22
Payer: MEDICARE

## 2025-05-22 DIAGNOSIS — M47.818 ARTHROPATHY OF RIGHT SACROILIAC JOINT: ICD-10-CM

## 2025-05-22 DIAGNOSIS — M96.1 POSTLAMINECTOMY SYNDROME OF LUMBAR REGION: ICD-10-CM

## 2025-05-22 DIAGNOSIS — M25.551 RIGHT HIP PAIN: ICD-10-CM

## 2025-05-22 PROCEDURE — 97110 THERAPEUTIC EXERCISES: CPT | Mod: CQ,GP | Performed by: SPECIALIST/TECHNOLOGIST

## 2025-05-22 PROCEDURE — 97140 MANUAL THERAPY 1/> REGIONS: CPT | Mod: CQ,GP | Performed by: SPECIALIST/TECHNOLOGIST

## 2025-05-27 ENCOUNTER — TREATMENT (OUTPATIENT)
Dept: PHYSICAL THERAPY | Facility: CLINIC | Age: 70
End: 2025-05-27
Payer: MEDICARE

## 2025-05-27 DIAGNOSIS — M47.818 ARTHROPATHY OF RIGHT SACROILIAC JOINT: ICD-10-CM

## 2025-05-27 DIAGNOSIS — M96.1 POSTLAMINECTOMY SYNDROME OF LUMBAR REGION: ICD-10-CM

## 2025-05-27 DIAGNOSIS — M25.551 RIGHT HIP PAIN: ICD-10-CM

## 2025-05-27 PROCEDURE — 97110 THERAPEUTIC EXERCISES: CPT | Mod: CQ,GP | Performed by: SPECIALIST/TECHNOLOGIST

## 2025-05-27 NOTE — PROGRESS NOTES
"    Physical Therapy Treatment    Patient Name: Contreras De La Cruz  MRN: [id number]  Encounter date:  5/27/2025  Time Calculation  Start Time: 1315  Stop Time: 1359  Time Calculation (min): 44 min     PT Therapeutic Procedures Time Entry  Therapeutic Exercise Time Entry: 43     Visit Number:  8 (including evaluation)  Planned total visits: 10  Visits Authorized/Insurance Coverage:  NO AUTH, 400 DED- MET, 100% [place], 400 OOP-MET, ANTHEM     Current Problem  Problem List Items Addressed This Visit    None  Visit Diagnoses         Codes      Postlaminectomy syndrome of lumbar region     M96.1      Arthropathy of right sacroiliac joint     M47.818      Right hip pain     M25.551          Precautions  Hx lumbar laminectomy     Pain  3/10 SI joint     Subjective     Patient reported no change pain in the R side SI joint, but stated he was able to do more gardening and yard work over the weekend than previous.     Objective   Patient demonstrates improved upright posture and more fluid movement with exercises. Improved hip clearance from LMT and trunk stability during bridges on stability ball vs previous visits.      Treatment:  Manual: STM to posterior R hip near R SI joint, Pat L side lying. DNP 5/27    Recumbent bike L1 x 6', seats 13      On table:   DKTC green ball x 10, 10\" holds   LTR 2x10 green ball  DL bridge green ball 2 x 10, 5\" hold    DL bridge with iso hip add x 20, 5\"   DL bridge with iso hip abd yellow loop x 20    SI joint self adjustment w/ dowel andres, 5 x 5\", alt hip extension/flexionn    Supine piriformis stretch 3 x 30\" L/R   BLE nerve flosses 2 x 10 L/R    Seated:  Hamstring stretch 3 x 30\" ea    Standing: w/ abdominal bracing  Sit to stands x 10, w/ 5#  Kettle bell at chest level   Mini squats x 10, W/ 5# KB at chest level  DL heel raise x10 DNP 5/27  Hip abduction x10  DNP 5/27  Pallof press x 20 ea 15#   Stir the pot x 20 ea 15#   Weight pick ups from table x 10, 10# kettle bell    MoFlex calf " "stretch 2x30\" DNP 5/27    Current HEP:  Access Code: [email or web address]  URL: [email or web address]  Date: 04/23/2025  Prepared by: Osbaldo Licona    Exercises  - Hooklying Single Knee to Chest Stretch with Towel  - 1 x daily - 7 x weekly - 1 sets - 3 reps - 30 sec hold  - Supine Posterior Pelvic Tilt  - 1 x daily - 7 x weekly - 2 sets - 10 reps - 3 second hold  - Supine Hip Adduction Isometric with Ball  - 1 x daily - 7 x weekly - 2 sets - 10 reps - 3 hold  - Hooklying Clamshell with Resistance  - 1 x daily - 7 x weekly - 2 sets - 10 reps  - Supine Lower Trunk Rotation  - 1 x daily - 7 x weekly - 2 sets - 10 reps  - Seated Hamstring Stretch  - 1 x daily - 7 x weekly - 1 sets - 3 reps - 30 second hold    Has patient been consistent with performance of HEP? Yes    Assessment:  Pt's response to treatment: Manual intervention not performed because it did not provide benefit last appointment. Good response to BLE stretches. Minimal decrease in discomfort near SI joint after adjustment techniques.  No increase in LBP with weight/kettlebell pickups from table, cueing provided to improve technique, good follow through. No change in R side SI joint pain following treatment. Good response to resuming stir the pot and standing presses.     Areas of improvements: Continued improvement of abdominal bracing w/ weight pickups and dynamic standing exercises.   Limitations/deficits: Decreased posture muscle/core strength. Increased LBP may decrease ADL endurance    Pain end of session: 3/10      Plan:     Continue with current POC with the following changes progress core exercises, add postural strengthening as able     Assessment of current progress against goals:  Progressing toward functional goals    Goals:  Active       PT Problem       STG, 5 visits       Start:  04/23/25    Expected End:  06/07/25       Pt will be independent in HEP to improve LE and core strength, mobility, and function.  Pt will report 50% reduction in pain " with functional mobility such as standing, walking, and stairs.         Pt will increase strength in core and LEs by 1/2 MMT in all planes for improved performance of functional mobility.       Start:  04/23/25    Expected End:  07/22/25            Pt will demonstrate no more than 25% limitation in lumbar AROM without pain in all planes for improved performance of ADLs.       Start:  04/23/25    Expected End:  07/22/25            Pt will ambulate community distances across all surfaces without deviation and without pain.       Start:  04/23/25    Expected End:  07/22/25            Pt will ascend/descend 2 flights of stairs reciprocally without deviation and without pain for improved household and community mobility.       Start:  04/23/25    Expected End:  07/22/25            Pt will demonstrate adequate strength and mobility to complete household tasks such as gardening and yardwork without pain or limitation.       Start:  04/23/25    Expected End:  07/22/25            Pt will perform 5x sit to  14s without pain for improved functional strength and mobility.       Start:  04/23/25    Expected End:  07/22/25

## 2025-05-28 NOTE — PROGRESS NOTES
"    Physical Therapy Treatment    Patient Name: Contreras De La Cruz  MRN: [id number]  Encounter date:  5/29/2025  Time Calculation  Start Time: 1430  Stop Time: 1511  Time Calculation (min): 41 min     PT Therapeutic Procedures Time Entry  Therapeutic Exercise Time Entry: 41       Visit Number:  9 (including evaluation)  Planned total visits: 10 visits  Visits Authorized/Insurance Coverage:  NO AUTH, 400 DED- MET, 100% [place], 400 OOP-MET, ANTHEM     Current Problem  Problem List Items Addressed This Visit    None  Visit Diagnoses         Codes      Postlaminectomy syndrome of lumbar region     M96.1      Arthropathy of right sacroiliac joint     M47.818      Right hip pain     M25.551              Precautions  Precautions  Precautions Comment: Hx lumbar laminectomy    Pain       Subjective  Back has been feeling really good. He has intermittent light ache but nothing sharp or severe. He is comepleting ADLs and daily tasks without much issue. He would like to make today his last visit and go on hold.     Objective  No gait deviations  Good stance stability B     Treatment:  Therapeutic Exercise  Therapeutic Exercise Performed: Yes  Recumbent bike L1 x 5'    Standing:  MoFlex calf stretch 3x30\"   DL heel raise 2x15 from half foam roll  Hip flexion SLR 2x10  Hip ext 2x10   Hip abduction 2x10   Pallof press x10 ea 10#  Stir the pot x10 ea 10# cw, ccw   Mini squat 2x10   Fwd step up 2x10 ea 6\"  Lateral step up 2x10 ea 6\"       Current HEP:  Access Code: [email or web address]  URL: [email or web address]  Date: 04/23/2025  Prepared by: Osbaldo Licona    Exercises  - Hooklying Single Knee to Chest Stretch with Towel  - 1 x daily - 7 x weekly - 1 sets - 3 reps - 30 sec hold  - Supine Posterior Pelvic Tilt  - 1 x daily - 7 x weekly - 2 sets - 10 reps - 3 second hold  - Supine Hip Adduction Isometric with Ball  - 1 x daily - 7 x weekly - 2 sets - 10 reps - 3 hold  - Hooklying Clamshell with Resistance  - 1 x daily - 7 x weekly - 2 " sets - 10 reps  - Supine Lower Trunk Rotation  - 1 x daily - 7 x weekly - 2 sets - 10 reps  - Seated Hamstring Stretch  - 1 x daily - 7 x weekly - 1 sets - 3 reps - 30 second hold    Has patient been consistent with performance of HEP? Yes    Assessment:  Pt's response to treatment: Pt with excellent improvement in symptoms over recent weeks. He shows independence with exercise plan and is completing ADLs and functional tasks without limitation. He feels he can continue on his own, POC to be placed on hold 30 days following visit. Pt may return within that time if symptoms indicate need for continued care.     Pain end of session: 0/10    Plan:  Hold 30 days     Goals:  Active       PT Problem       STG, 5 visits       Start:  04/23/25    Expected End:  06/07/25       Pt will be independent in HEP to improve LE and core strength, mobility, and function.  Pt will report 50% reduction in pain with functional mobility such as standing, walking, and stairs.         Pt will increase strength in core and LEs by 1/2 MMT in all planes for improved performance of functional mobility.       Start:  04/23/25    Expected End:  07/22/25            Pt will demonstrate no more than 25% limitation in lumbar AROM without pain in all planes for improved performance of ADLs.       Start:  04/23/25    Expected End:  07/22/25            Pt will ambulate community distances across all surfaces without deviation and without pain.       Start:  04/23/25    Expected End:  07/22/25            Pt will ascend/descend 2 flights of stairs reciprocally without deviation and without pain for improved household and community mobility.       Start:  04/23/25    Expected End:  07/22/25            Pt will demonstrate adequate strength and mobility to complete household tasks such as gardening and yardwork without pain or limitation.       Start:  04/23/25    Expected End:  07/22/25            Pt will perform 5x sit to  14s without pain for  improved functional strength and mobility.       Start:  04/23/25    Expected End:  07/22/25

## 2025-05-29 ENCOUNTER — TREATMENT (OUTPATIENT)
Dept: PHYSICAL THERAPY | Facility: CLINIC | Age: 70
End: 2025-05-29
Payer: MEDICARE

## 2025-05-29 DIAGNOSIS — M47.818 ARTHROPATHY OF RIGHT SACROILIAC JOINT: ICD-10-CM

## 2025-05-29 DIAGNOSIS — M96.1 POSTLAMINECTOMY SYNDROME OF LUMBAR REGION: ICD-10-CM

## 2025-05-29 DIAGNOSIS — M25.551 RIGHT HIP PAIN: ICD-10-CM

## 2025-05-29 PROCEDURE — 97110 THERAPEUTIC EXERCISES: CPT | Mod: GP

## 2025-06-03 ENCOUNTER — APPOINTMENT (OUTPATIENT)
Dept: PHYSICAL THERAPY | Facility: CLINIC | Age: 70
End: 2025-06-03
Payer: MEDICARE

## 2025-06-24 ENCOUNTER — OFFICE VISIT (OUTPATIENT)
Dept: PAIN MEDICINE | Facility: CLINIC | Age: 70
End: 2025-06-24
Payer: MEDICARE

## 2025-06-24 VITALS
BODY MASS INDEX: 31.07 KG/M2 | HEIGHT: 70 IN | HEART RATE: 72 BPM | SYSTOLIC BLOOD PRESSURE: 142 MMHG | OXYGEN SATURATION: 96 % | RESPIRATION RATE: 18 BRPM | WEIGHT: 217 LBS | DIASTOLIC BLOOD PRESSURE: 82 MMHG

## 2025-06-24 DIAGNOSIS — M47.818 ARTHROPATHY OF RIGHT SACROILIAC JOINT: Primary | ICD-10-CM

## 2025-06-24 DIAGNOSIS — R10.31 RIGHT GROIN PAIN: ICD-10-CM

## 2025-06-24 PROCEDURE — 1159F MED LIST DOCD IN RCRD: CPT | Performed by: STUDENT IN AN ORGANIZED HEALTH CARE EDUCATION/TRAINING PROGRAM

## 2025-06-24 PROCEDURE — G2211 COMPLEX E/M VISIT ADD ON: HCPCS | Performed by: STUDENT IN AN ORGANIZED HEALTH CARE EDUCATION/TRAINING PROGRAM

## 2025-06-24 PROCEDURE — 1160F RVW MEDS BY RX/DR IN RCRD: CPT | Performed by: STUDENT IN AN ORGANIZED HEALTH CARE EDUCATION/TRAINING PROGRAM

## 2025-06-24 PROCEDURE — 4004F PT TOBACCO SCREEN RCVD TLK: CPT | Performed by: STUDENT IN AN ORGANIZED HEALTH CARE EDUCATION/TRAINING PROGRAM

## 2025-06-24 PROCEDURE — 1125F AMNT PAIN NOTED PAIN PRSNT: CPT | Performed by: STUDENT IN AN ORGANIZED HEALTH CARE EDUCATION/TRAINING PROGRAM

## 2025-06-24 PROCEDURE — 3077F SYST BP >= 140 MM HG: CPT | Performed by: STUDENT IN AN ORGANIZED HEALTH CARE EDUCATION/TRAINING PROGRAM

## 2025-06-24 PROCEDURE — 3008F BODY MASS INDEX DOCD: CPT | Performed by: STUDENT IN AN ORGANIZED HEALTH CARE EDUCATION/TRAINING PROGRAM

## 2025-06-24 PROCEDURE — 99214 OFFICE O/P EST MOD 30 MIN: CPT | Performed by: STUDENT IN AN ORGANIZED HEALTH CARE EDUCATION/TRAINING PROGRAM

## 2025-06-24 PROCEDURE — 3079F DIAST BP 80-89 MM HG: CPT | Performed by: STUDENT IN AN ORGANIZED HEALTH CARE EDUCATION/TRAINING PROGRAM

## 2025-06-24 ASSESSMENT — PAIN - FUNCTIONAL ASSESSMENT: PAIN_FUNCTIONAL_ASSESSMENT: 0-10

## 2025-06-24 ASSESSMENT — PAIN DESCRIPTION - DESCRIPTORS: DESCRIPTORS: DULL;ACHING

## 2025-06-24 ASSESSMENT — PAIN SCALES - GENERAL
PAINLEVEL_OUTOF10: 4
PAINLEVEL_OUTOF10: 4

## 2025-06-24 NOTE — PROGRESS NOTES
"[facility] Pain Management  Follow Up Office Visit Note 2025    Patient Information: Contreras De La Cruz, MRN: [id number], : 1955   Primary Care/Referring Physician: Bryan Esparza MD, [address] [place] [address] / [place] [address]     Chief Complaint: Right low back/buttock pain  Interval History: He returns for follow up. At his last visit I referred him for PT and prescribed a Medrol dosepak    Today he reports doing slightly better since last seen. He has been doing PT and took the Medrol dosepak with some improvement but still feels limited by his pain. He continues to have right buttock pain which radiates forward into his groin. He appears more interested in pursuing right SI joint fusion.     For reference, he reports a recent pain flare after an incident where he was reaching forward and lifting and felt a \"pop\" in the low back. He is currently having midline low back pain, near the lumbosacral junction, which radiates down towards his right groin. This pain worsens primary with walking and going up and down the steps    Brief History of Pain: Mr. Contreras De La Cruz is a 70 y.o. male with a PMHx of  HTN, HLD, GERD, tobacco use disorder who presents for evaluation of mid/low back, right leg, and tailbone pain.    For reference, he reports pain ongoing for many years, with recent exacerbation of low back and right leg pain. There was no obvious inciting event that caused this worsening pain. This pain worsens primarily with bending forward and lifting. He previously had an SCS in place for 10-15 years but had this removed in 2022 because he did not feel it was providing pain relief any longer. He was previously followed by Dr. Mcneal at [facility] for some time who was doing injections with benefit. Most recently he was followed by Dr. Herrera, who performed lumbar medial branch blocks with benefit, but the patient did not want to continue receiving injections since no sedation was given    Current Pain " Medications: Uses THC gummies, Duloxetine 60 mg daily  Previously Tried Pain Medications: Tylenol - no benefit. Meloxicam, Celecoxib. He reports trying a number of medications for pain in the past but is not interested in restarting any of them    Relevant Surgeries: Hx of lumbar spine surgery x2 (last in 1990's) as well as cervical spine surgery (2003). Intracept at L4, L5, S1 - 80% pain relief.   Injections: Caudal LIZZETTE - 80% pain relief. Right SI joint injection - 100% pain relief for 6 months. Knee steroid injections with minimal relief. Lumbar mbb's with only 30% relief. Reportedly had multiple other injections with Dr. Mcneal at [facility]   Physical/Occupational Therapy: Has done PT in the past but not recently    Medications:   Current Outpatient Medications   Medication Instructions    acetaminophen (TYLENOL EXTRA STRENGTH) 500-1,000 mg, oral, 3 times daily PRN    atorvastatin (LIPITOR) 40 mg, oral, Daily    cholecalciferol (VITAMIN D3) 50 mcg, oral, Daily    diclofenac sodium (VOLTAREN) 4 g, Topical, 3 times daily PRN    DULoxetine (CYMBALTA) 60 mg, oral, Daily    esomeprazole (NEXIUM) 20 mg, oral, Daily    magnesium hydroxide (MAGNESIA ORAL) 500 mg    multivitamin tablet 1 tablet, oral, Daily      Allergies:   Allergies   Allergen Reactions    Iodinated Contrast Media Other    Gabapentin Hives    Iodine Nausea/vomiting     iodine    Niacin-Lovastatin Hives       Past Medical & Surgical History:  Past Medical History:   Diagnosis Date    Anemia     Cervical disc disease     Chronic fatigue     Chronic pain     Class 1 obesity with body mass index (BMI) of 30.0 to 30.9 in adult 07/30/2024    GERD (gastroesophageal reflux disease)     HLD (hyperlipidemia)     Hypertension     Impaired fasting glucose     Lumbar disc disease     Polyneuropathy     Polyp of colon     Primary osteoarthritis involving multiple joints     Thoracic disc disease       Past Surgical History:   Procedure Laterality Date    APPENDECTOMY   2016    ARTHRODESIS WRIST W/ OR W/O BONE GRAFT Right 10/31/2014    BACK SURGERY  1988    BACK SURGERY  1998    INSERTION / REMOVAL EPIDURAL SPINAL NEUROSTIMULATOR  2010    LUMBAR INSERTION    LEG SURGERY Right 1980    plate put in broke leg and shin due to an accident    NECK SURGERY      REVISION / REMOVAL NEUROSTIMULATOR  2020    LUMBAR GENERATOR REPLACEMENT    VARICOSE VEIN SURGERY      WRIST SURGERY         Family History   Problem Relation Name Age of Onset    Heart failure Mother      Cancer Father       Social History     Socioeconomic History    Marital status:      Spouse name: Not on file    Number of children: Not on file    Years of education: Not on file    Highest education level: Not on file   Occupational History    Not on file   Tobacco Use    Smoking status: Former     Current packs/day: 0.00     Average packs/day: 1.3 packs/day for 20.0 years (25.0 ttl pk-yrs)     Types: Cigarettes     Start date:      Quit date:      Years since quittin.5    Smokeless tobacco: Current     Types: Chew   Vaping Use    Vaping status: Never Used   Substance and Sexual Activity    Alcohol use: Not Currently    Drug use: Yes     Frequency: 7.0 times per week     Types: Marijuana     Comment: THC 10 mg dailt    Sexual activity: Not on file   Other Topics Concern    Not on file   Social History Narrative    Not on file     Social Drivers of Health     Financial Resource Strain: Not on file   Food Insecurity: Not on file   Transportation Needs: Not on file   Physical Activity: Not on file   Stress: Not on file   Social Connections: Not on file   Intimate Partner Violence: Not on file   Housing Stability: Not on file       Problems, Past medical history, past surgical history, Medications, allergies, social and family history reviewed and as per the electronic medical record from today's encounter    Review of Systems:  CONST: No fever, chills, fatigue, weight changes  EYES: No  "loss of vision  ENT: No hearing loss, tinnitus  CV: No chest pain, palpitations  RESP: No dyspnea, shortness of breath, cough  GI: No stool incontinence, nausea, vomiting  : No urinary incontinence  MSK: No joint swelling  SKIN: No rash, no hives  NEURO: No headache, dizziness  PSYCH: No anxiety, depression  HEM/LYMPH: No easy bruising or bleeding    Physical Exam:  Vitals: /82   Pulse 72   Resp 18   Ht 1.778 m (5' 10\")   Wt 98.4 kg (217 lb)   SpO2 96%   BMI 31.14 kg/m²   General: No apparent distress. Alert, appropriate, oriented x 3. Mood generally positive, affect congruent. Speaking in full sentences.   HENT: Normocephalic, atraumatic. Hearing intact.  Eyes: Pupils equal and round  Neck: Supple, trachea midline  Lungs: Symmetric respiratory excursion on visual exam, nonlabored breathing.   Extremities: No cyanosis or edema noted in extremities.  Skin: No rashes, lesions noted.  MSK: No pain with right hip FADIR maneuver. KENDALL test positive on the right. No pain to palpation overlying his right ischial tuberosity   Neuro: Alert and appropriate. Gait within normal limits. Bulk and tone within normal limits.    Laboratory Data:  The following laboratory data were reviewed during this visit:   Lab Results   Component Value Date    WBC 10.0 01/24/2025    RBC 6.81 (H) 01/24/2025    HGB 12.8 (L) 01/24/2025    HCT 42.3 01/24/2025     (H) 01/24/2025      No results found for: \"INR\"  Lab Results   Component Value Date    CREATININE 0.85 01/24/2025    HGBA1C 5.6 07/26/2024       Imaging:  The following imaging impressions were reviewed by me during this visit:    - 8/17/23 lumbar spine MRI shows L4/5 moderate posterior disc osteophyte complex, postsurgical changes about the right rosalia lamina, moderate right foraminal narrowing. L5/S1 asymmetric left paracentral and foraminal disc bulge likely abutting the descedning left S1 nerve root, postsurgical changes status post left hemilaminectomy. Post " gadolinium imaging demonstrates mild left lateral and posterolateral epidural enhancement. No nerve root enhancement demonstrated. Modic changes noted at L5 and S1, and more subtly at L4.  -5/1/23 bilateral knee xray shows moderate OA  -8/11/23 thoracic spine CT shows T10-T11 right foraminal disc protrusion causing moderate right and no left foraminal stenosis or significant canal stenosis.    I also personally reviewed the images from the above studies myself. These images and my interpretation of them contributed to the management and decision making of the patient's medical plan.    ASSESSMENT:  Mr. Contreras De La Cruz is a 70 y.o. male with low back and right leg pain that is consistent with:    1. Arthropathy of right sacroiliac joint    2. Right groin pain        PLAN:  Radiology: -He continues to have persistent right buttock and groin pain.  I recommend a CT of the pelvis for diagnostic and interventional planning purposes prior to possible right SI joint fusion.    Physically: Continue current PT    Psychologically: No needs at this time    Medication: No changes to his medications today. He reports undergoing numerous medication trials in the past and is not interested in adding any new medications.    Duration: Multiple years    Intervention: I suspect his low back/buttock pain is secondary to right sacroiliac joint arthropathy, lumbar radiculopathy and vertebrogenic low back pain. He has a fairly complex history of multiple back surgeries, multiple injections for pain, as well as SCS. He underwent Intracept at L4, L5, and S1 with 80% improvement in his axial back pain  - He has right leg pain that is likely radicular in nature. I performed a caudal LIZZETTE with 80% improvement in his right leg pain.   - He continues to have right buttock and groin pain which is most consistent with right sacroiliac joint arthropathy. He underwent a right SI joint injection with 100% pain relief for 6 months. This was repeated with  80% relief but his pain has returned to baseline. He is considering having a right SI joint fusion.  Prior to proceeding with this I recommend a diagnostic right SI joint injection using only local anesthetic and utilizing live fluoroscopy. Risks, benefits, alternatives discussed.  He would like to proceed.              Sincerely,  Bartolome Huber MD  [facility] [place]

## 2025-07-08 ENCOUNTER — HOSPITAL ENCOUNTER (OUTPATIENT)
Dept: RADIOLOGY | Facility: CLINIC | Age: 70
Discharge: HOME | End: 2025-07-08
Payer: MEDICARE

## 2025-07-08 DIAGNOSIS — R10.31 RIGHT GROIN PAIN: ICD-10-CM

## 2025-07-08 DIAGNOSIS — M47.818 ARTHROPATHY OF RIGHT SACROILIAC JOINT: ICD-10-CM

## 2025-07-08 PROCEDURE — 72192 CT PELVIS W/O DYE: CPT

## 2025-07-21 ENCOUNTER — DOCUMENTATION (OUTPATIENT)
Dept: PHYSICAL THERAPY | Facility: CLINIC | Age: 70
End: 2025-07-21
Payer: MEDICARE

## 2025-07-21 NOTE — PROGRESS NOTES
"Discharge Summary    Name: Contreras De La Cruz \"NORA\"  MRN: [id number]  : 1955  Date: 25    Discharge Summary: PT    Discharge Information: Date of discharge 25 and Date of last visit 25    Therapy Summary: Pt placed on hold following last session reporting minimal pain and ability to continue independently. Pt did not return for further treatment.    Rehab Discharge Reason: Achieved all and/or the most significant goals(s)    "

## 2025-07-28 ENCOUNTER — HOSPITAL ENCOUNTER (OUTPATIENT)
Dept: GASTROENTEROLOGY | Facility: HOSPITAL | Age: 70
Discharge: HOME | End: 2025-07-28
Payer: MEDICARE

## 2025-07-28 VITALS
HEIGHT: 70 IN | OXYGEN SATURATION: 98 % | HEART RATE: 56 BPM | DIASTOLIC BLOOD PRESSURE: 88 MMHG | BODY MASS INDEX: 31.07 KG/M2 | WEIGHT: 217 LBS | SYSTOLIC BLOOD PRESSURE: 137 MMHG | TEMPERATURE: 97 F | RESPIRATION RATE: 18 BRPM

## 2025-07-28 DIAGNOSIS — M47.818 ARTHROPATHY OF RIGHT SACROILIAC JOINT: ICD-10-CM

## 2025-07-28 DIAGNOSIS — E78.2 MIXED HYPERLIPIDEMIA: ICD-10-CM

## 2025-07-28 DIAGNOSIS — I10 PRIMARY HYPERTENSION: ICD-10-CM

## 2025-07-28 DIAGNOSIS — E55.9 VITAMIN D DEFICIENCY: ICD-10-CM

## 2025-07-28 DIAGNOSIS — Z12.5 ENCOUNTER FOR PROSTATE CANCER SCREENING: ICD-10-CM

## 2025-07-28 DIAGNOSIS — R73.01 IFG (IMPAIRED FASTING GLUCOSE): ICD-10-CM

## 2025-07-28 PROCEDURE — 7100000010 HC PHASE TWO TIME - EACH INCREMENTAL 1 MINUTE

## 2025-07-28 PROCEDURE — 27096 INJECT SACROILIAC JOINT: CPT | Performed by: STUDENT IN AN ORGANIZED HEALTH CARE EDUCATION/TRAINING PROGRAM

## 2025-07-28 PROCEDURE — 2500000004 HC RX 250 GENERAL PHARMACY W/ HCPCS (ALT 636 FOR OP/ED): Performed by: STUDENT IN AN ORGANIZED HEALTH CARE EDUCATION/TRAINING PROGRAM

## 2025-07-28 PROCEDURE — 2550000001 HC RX 255 CONTRASTS: Mod: JW | Performed by: STUDENT IN AN ORGANIZED HEALTH CARE EDUCATION/TRAINING PROGRAM

## 2025-07-28 PROCEDURE — 7100000009 HC PHASE TWO TIME - INITIAL BASE CHARGE

## 2025-07-28 PROCEDURE — A9575 INJ GADOTERATE MEGLUMI 0.1ML: HCPCS | Mod: JW | Performed by: STUDENT IN AN ORGANIZED HEALTH CARE EDUCATION/TRAINING PROGRAM

## 2025-07-28 RX ORDER — GADOTERATE MEGLUMINE 376.9 MG/ML
INJECTION INTRAVENOUS AS NEEDED
Status: COMPLETED | OUTPATIENT
Start: 2025-07-28 | End: 2025-07-28

## 2025-07-28 RX ORDER — ROPIVACAINE HYDROCHLORIDE 2 MG/ML
INJECTION, SOLUTION EPIDURAL; INFILTRATION; PERINEURAL AS NEEDED
Status: COMPLETED | OUTPATIENT
Start: 2025-07-28 | End: 2025-07-28

## 2025-07-28 RX ORDER — LIDOCAINE HYDROCHLORIDE 10 MG/ML
INJECTION, SOLUTION EPIDURAL; INFILTRATION; INTRACAUDAL; PERINEURAL AS NEEDED
Status: COMPLETED | OUTPATIENT
Start: 2025-07-28 | End: 2025-07-28

## 2025-07-28 RX ADMIN — ROPIVACAINE HYDROCHLORIDE 2 ML: 2 INJECTION, SOLUTION EPIDURAL; INFILTRATION at 09:00

## 2025-07-28 RX ADMIN — GADOTERATE MEGLUMINE 0.5 ML: 376.9 INJECTION, SOLUTION INTRAVENOUS at 09:00

## 2025-07-28 RX ADMIN — LIDOCAINE HYDROCHLORIDE 2 ML: 10 INJECTION, SOLUTION EPIDURAL; INFILTRATION; INTRACAUDAL; PERINEURAL at 09:00

## 2025-07-28 ASSESSMENT — ENCOUNTER SYMPTOMS
EYE DISCHARGE: 0
PALPITATIONS: 0
WEAKNESS: 0
DYSPHORIC MOOD: 0
NUMBNESS: 0
RHINORRHEA: 0
CHILLS: 0
NERVOUS/ANXIOUS: 0
COLOR CHANGE: 0
HEADACHES: 0
DIZZINESS: 0
FEVER: 0
UNEXPECTED WEIGHT CHANGE: 0
SHORTNESS OF BREATH: 0
COUGH: 0
MYALGIAS: 0
SORE THROAT: 0
LIGHT-HEADEDNESS: 0
BRUISES/BLEEDS EASILY: 0

## 2025-07-28 ASSESSMENT — PAIN - FUNCTIONAL ASSESSMENT
PAIN_FUNCTIONAL_ASSESSMENT: 0-10
PAIN_FUNCTIONAL_ASSESSMENT: 0-10

## 2025-07-28 ASSESSMENT — PAIN SCALES - GENERAL
PAINLEVEL_OUTOF10: 4
PAINLEVEL_OUTOF10: 0 - NO PAIN

## 2025-07-28 ASSESSMENT — PAIN DESCRIPTION - DESCRIPTORS: DESCRIPTORS: ACHING;SHARP;BURNING

## 2025-07-28 NOTE — DISCHARGE INSTRUCTIONS
DISCHARGE INSTRUCTIONS FOR INJECTIONS     You underwent a diagnostic right sided sacroiliac joint injection today    Aftermost injections, it is recommended that you relax and limit your activity for the remainder of the day unless you have been told otherwise by your pain physician.  You should not drive a car, operate machinery, or make important legal decisions unless otherwise directed by your pain physician.  You may resume your normal activity, including exercise, tomorrow.      Keep a written pain diary of how much pain relief you experienced following the injection procedure and the length of time of pain relief you experienced pain relief. Following diagnostic injections like medial branch nerve blocks, sacroiliac joint blocks, stellate ganglion injections and other blocks, it is very important you record the specific amount of pain relief you experienced immediately after the injectionand how long it lasted. Your doctor will ask you for this information at your follow up visit.     For all injections, please keep the injection site dry and inspect the site for a couple of days. You may remove the Band-Aid the day of the injection at any time.     Some discomfort, bruising or slight swelling may occur at the injection site. This is not abnormal if it occurs.  If needed you may:    -Take over the counter medication such as Tylenol or Motrin.   -Apply an ice pack for 30 minutes, 2 to 3 times a day for the first 24 hours.     You may shower today; no soaking baths, hot tubs, whirlpools or swimming pools for two days.      If you are given steroids in your injection, it may take 3-5 days for the steroid medication to take effect. You may notice a worsening of your symptoms for 1-2 days after the injection. This is not abnormal.  You may use acetaminophen, ibuprofen, or prescription medication that your doctor may have prescribed for you if you need to do so.     A few common side effects of steroids include  facial flushing, sweating, restlessness, irritability,difficulty sleeping, increase in blood sugar, and increased blood pressure. If you have diabetes, please monitor your blood sugar at least once a day for at least 5 days. If you have poorly controlled high blood pressure, monitoryour blood pressure for at least 2 days and contact your primary care physician if these numbers are unusually high for you.      If you take aspirin or non-steroidal anti-inflammatory drugs (examples are Motrin, Advil, ibuprofen, Naprosyn, Voltaren, Relafen, etc.) you may restart these this evening, but stop taking it 3 days before your next appointment, unless instructed otherwiseby your physician.      You do not need to discontinue non-aspirin-containing pain medications prior to an injection (examples: Celebrex, tramadol, hydrocodone and acetaminophen).      If you take a blood thinning medication (Coumadin, Lovenox, Fragmin,Ticlid, Plavix, Pradaxa, etc.), please discuss this with your primary care physician/cardiologist and your pain physician. These medications MUST be discontinued before you can have an injection safely, without the risk of uncontrolled bleeding. If these medications are not discontinued for an appropriate period of time, you will not be able to receivean injection.      If you are taking Coumadin, please have your INR checked the morning of your procedure and bringthe result to your appointment unless otherwise instructed. If your INR is over 1.2, your injection may need to be rescheduled to avoid uncontrolled bleeding from the needle placement.     Call [facility] at [phone] between 8am-4pm Monday - Friday if you are experiencing the following:    If you received an epidural or spinal injection:    -Headache that doesnot go away with medicine, is worse when sitting or standing up, and is greatly relieved upon lying down.   -Severe pain worse than or different than your baseline pain.    -Chills or fever (101º F or greater).   -Drainage or signs of infection at the injection site     Go directly to the Emergency Department if you are experiencing the following and received an epidural or spinal injection:   -Abrupt weakness or progressive weakness in your legs that starts after you leave the clinic.   -Abrupt severe or worsening numbness in your legs.   -Inability to urinate after the injection or loss of bowel or bladder control without the urge to defecate or urinate.     If you have a clinical question that cannot wait until your next appointment, please call [phone] between 8am-4pm Monday - Friday or send a [employer] message. We do our best to return all non-emergency messages within 24 hours, Monday - Friday. A nurse or physician will return your message.      If you need to cancel an appointment, please call the scheduling staff at [phone] during normal business hours or leave a message at least 24 hours in advance.     If you are going to be sedated for your next procedure, you MUST have responsible adult who can legally drive accompany you home. You cannot eat or drink for eight hours prior to the planned procedure if you are going to receive sedation. You may take your non-blood thinning medications with a small sip of water.

## 2025-07-28 NOTE — POST-PROCEDURE NOTE
PT ARRIVED IN PHASE II.  BREATHING REGULAR AND  NON LABORED.  PT DENIES PAIN, NAUSEA OR DIZZINESS.  DRESSING CLEAN, DRY AND INTACT WITHOUT SHADOWING.  PT ABLE TO AMBULATE WITHOUT DIFFICULTY.  FAMILY AT BEDSIDE.

## 2025-07-28 NOTE — H&P
"History Of Present Illness  Contreras De La Cruz \"Jhonathan\" is a 70 y.o. male presenting with right buttock pain secondary to sacroiliac joint arthropathy.  He denies any significant change to his pain or underlying health since last seen in the office..     Past Medical History  Medical History[1]    Surgical History  Surgical History[2]     Social History  He reports that he quit smoking about 35 years ago. His smoking use included cigarettes. He started smoking about 55 years ago. He has a 25 pack-year smoking history. His smokeless tobacco use includes chew. He reports that he does not currently use alcohol. He reports current drug use. Frequency: 7.00 times per week. Drug: Marijuana.    Family History  Family History[3]     Allergies  Iodinated contrast media, Gabapentin, Iodine, and Niacin-lovastatin    Review of Systems   Constitutional:  Negative for chills, fever and unexpected weight change.   HENT:  Negative for congestion, rhinorrhea, sneezing and sore throat.    Eyes:  Negative for discharge.   Respiratory:  Negative for cough and shortness of breath.    Cardiovascular:  Negative for chest pain, palpitations and leg swelling.   Musculoskeletal:  Negative for gait problem and myalgias.   Skin:  Negative for color change and rash.   Neurological:  Negative for dizziness, weakness, light-headedness, numbness and headaches.   Hematological:  Does not bruise/bleed easily.   Psychiatric/Behavioral:  Negative for dysphoric mood. The patient is not nervous/anxious.         Physical Exam  Vitals and nursing note reviewed.   Constitutional:       General: He is not in acute distress.     Appearance: Normal appearance. He is not ill-appearing.   HENT:      Head: Normocephalic and atraumatic.     Eyes:      Conjunctiva/sclera: Conjunctivae normal.     Pulmonary:      Effort: Pulmonary effort is normal. No respiratory distress.     Musculoskeletal:         General: No swelling or deformity.      Right lower leg: No edema.      " "Left lower leg: No edema.     Skin:     General: Skin is warm and dry.      Findings: No bruising, erythema, lesion or rash.     Neurological:      General: No focal deficit present.      Mental Status: He is alert and oriented to person, place, and time.     Psychiatric:         Mood and Affect: Mood normal.          Last Recorded Vitals  Blood pressure (!) 152/94, pulse 70, temperature 36.4 °C (97.6 °F), temperature source Temporal, resp. rate 16, height 1.778 m (5' 10\"), weight 98.4 kg (217 lb), SpO2 97%.    Relevant Results         Assessment & Plan  Arthropathy of right sacroiliac joint      There have been no significant changes since last seen.  Will proceed with the right SI joint injection as planned    I spent 10 minutes in the professional and overall care of this patient.      Bartolome Huber MD         [1]   Past Medical History:  Diagnosis Date    Anemia         Arthritis     Cervical disc disease     Chronic fatigue     Chronic pain     Class 1 obesity with body mass index (BMI) of 30.0 to 30.9 in adult 07/30/2024    GERD (gastroesophageal reflux disease)     HLD (hyperlipidemia)     Hypertension     Impaired fasting glucose     Lumbar disc disease     Polyneuropathy     Polyp of colon     Primary osteoarthritis involving multiple joints     Spinal stenosis     Thoracic disc disease    [2]   Past Surgical History:  Procedure Laterality Date    APPENDECTOMY  04/21/2016    ARTHRODESIS WRIST W/ OR W/O BONE GRAFT Right 10/31/2014    BACK SURGERY  1988    BACK SURGERY  1998    INSERTION / REMOVAL EPIDURAL SPINAL NEUROSTIMULATOR  09/16/2010    LUMBAR INSERTION    LEG SURGERY Right 1980    plate put in broke leg and shin due to an accident    NECK SURGERY  2003    REVISION / REMOVAL NEUROSTIMULATOR  02/24/2020    LUMBAR GENERATOR REPLACEMENT    VARICOSE VEIN SURGERY      WRIST SURGERY     [3]   Family History  Problem Relation Name Age of Onset    Heart failure Mother      Cancer Father       "

## 2025-07-29 ENCOUNTER — TELEPHONE (OUTPATIENT)
Dept: PAIN MEDICINE | Facility: CLINIC | Age: 70
End: 2025-07-29
Payer: MEDICARE

## 2025-07-29 DIAGNOSIS — M47.818 ARTHROPATHY OF RIGHT SACROILIAC JOINT: Primary | ICD-10-CM

## 2025-07-29 NOTE — TELEPHONE ENCOUNTER
Phone call from the pt asking to move his follow up appt to a sooner appt. Advised that nothing was available prior to his appt. He is looking for his MRI results and reports that his pain level is pretty high. Reports it is currently a 5 but he has been having a hard time taking his walks today and yesterday like he usually does. Pt did have SIJ done yesterday, did advise to the pt that it can take time for steroid to be fully effective. Also checked Karissa's schedule but she did not have anything sooner either. Please advise.

## 2025-07-29 NOTE — TELEPHONE ENCOUNTER
The injection we just performed was purely diagnostic (local anesthetic only) so he is not going to get any prolonged pain relief.  We are doing this prior to possibly doing an SI joint fusion.    I would ask him whether he got any pain relief immediately after the injection, and if so for how long.    If he wants, I can send him in an anti-inflammatory to try called diclofenac which may help with his pain and inflammation

## 2025-07-30 RX ORDER — DICLOFENAC SODIUM 75 MG/1
75 TABLET, DELAYED RELEASE ORAL 2 TIMES DAILY
Qty: 60 TABLET | Refills: 0 | Status: SHIPPED | OUTPATIENT
Start: 2025-07-30 | End: 2025-08-29

## 2025-08-01 LAB
25(OH)D3+25(OH)D2 SERPL-MCNC: 35 NG/ML (ref 30–100)
ALBUMIN SERPL-MCNC: 4.7 G/DL (ref 3.6–5.1)
ALBUMIN/GLOB SERPL: 2.4 (CALC) (ref 1–2.5)
ALP SERPL-CCNC: 51 U/L (ref 35–144)
ALT SERPL-CCNC: 27 U/L (ref 9–46)
ANION GAP SERPL CALCULATED.4IONS-SCNC: 9 MMOL/L (CALC) (ref 7–17)
AST SERPL-CCNC: 20 U/L (ref 10–35)
BASOPHILS # BLD AUTO: 106 CELLS/UL (ref 0–200)
BASOPHILS NFR BLD AUTO: 1.4 %
BILIRUB DIRECT SERPL-MCNC: 0.2 MG/DL
BILIRUB INDIRECT SERPL-MCNC: 0.9 MG/DL (CALC) (ref 0.2–1.2)
BILIRUB SERPL-MCNC: 1.1 MG/DL (ref 0.2–1.2)
BUN SERPL-MCNC: 19 MG/DL (ref 7–25)
BUN/CREAT SERPL: ABNORMAL (CALC) (ref 6–22)
CALCIUM SERPL-MCNC: 9.8 MG/DL (ref 8.6–10.3)
CHLORIDE SERPL-SCNC: 102 MMOL/L (ref 98–110)
CHOLEST SERPL-MCNC: 171 MG/DL
CHOLEST/HDLC SERPL: 4.9 (CALC)
CO2 SERPL-SCNC: 26 MMOL/L (ref 20–32)
CREAT SERPL-MCNC: 0.91 MG/DL (ref 0.7–1.35)
EGFRCR SERPLBLD CKD-EPI 2021: 91 ML/MIN/1.73M2
EOSINOPHIL # BLD AUTO: 228 CELLS/UL (ref 15–500)
EOSINOPHIL NFR BLD AUTO: 3 %
ERYTHROCYTE [DISTWIDTH] IN BLOOD BY AUTOMATED COUNT: 18.9 % (ref 11–15)
EST. AVERAGE GLUCOSE BLD GHB EST-MCNC: 111 MG/DL
EST. AVERAGE GLUCOSE BLD GHB EST-SCNC: 6.2 MMOL/L
FERRITIN SERPL-MCNC: 72 NG/ML (ref 24–380)
GLOBULIN SER CALC-MCNC: 2 G/DL (CALC) (ref 1.9–3.7)
GLUCOSE SERPL-MCNC: 103 MG/DL (ref 65–99)
HBA1C MFR BLD: 5.5 %
HCT VFR BLD AUTO: 43.3 % (ref 38.5–50)
HDLC SERPL-MCNC: 35 MG/DL
HGB BLD-MCNC: 12.9 G/DL (ref 13.2–17.1)
IRON SATN MFR SERPL: 57 % (CALC) (ref 20–48)
IRON SERPL-MCNC: 139 MCG/DL (ref 50–180)
LDLC SERPL CALC-MCNC: 102 MG/DL (CALC)
LYMPHOCYTES # BLD AUTO: 1930 CELLS/UL (ref 850–3900)
LYMPHOCYTES NFR BLD AUTO: 25.4 %
MCH RBC QN AUTO: 18.8 PG (ref 27–33)
MCHC RBC AUTO-ENTMCNC: 29.8 G/DL (ref 32–36)
MCV RBC AUTO: 63.1 FL (ref 80–100)
MONOCYTES # BLD AUTO: 555 CELLS/UL (ref 200–950)
MONOCYTES NFR BLD AUTO: 7.3 %
MORPHOLOGY BLD-IMP: ABNORMAL
NEUTROPHILS # BLD AUTO: 4780 CELLS/UL (ref 1500–7800)
NEUTROPHILS NFR BLD AUTO: 62.9 %
NONHDLC SERPL-MCNC: 136 MG/DL (CALC)
PLATELET # BLD AUTO: 369 THOUSAND/UL (ref 140–400)
PMV BLD REES-ECKER: 10.9 FL (ref 7.5–12.5)
POTASSIUM SERPL-SCNC: 4.5 MMOL/L (ref 3.5–5.3)
PROT SERPL-MCNC: 6.7 G/DL (ref 6.1–8.1)
PSA SERPL-MCNC: 1.59 NG/ML
RBC # BLD AUTO: 6.86 MILLION/UL (ref 4.2–5.8)
SODIUM SERPL-SCNC: 137 MMOL/L (ref 135–146)
TIBC SERPL-MCNC: 245 MCG/DL (CALC) (ref 250–425)
TRIGL SERPL-MCNC: 222 MG/DL
TSH SERPL-ACNC: 2.35 MIU/L (ref 0.4–4.5)
WBC # BLD AUTO: 7.6 THOUSAND/UL (ref 3.8–10.8)

## 2025-08-05 ENCOUNTER — OFFICE VISIT (OUTPATIENT)
Dept: PRIMARY CARE | Facility: CLINIC | Age: 70
End: 2025-08-05
Payer: MEDICARE

## 2025-08-05 VITALS
HEIGHT: 70 IN | WEIGHT: 206 LBS | DIASTOLIC BLOOD PRESSURE: 80 MMHG | SYSTOLIC BLOOD PRESSURE: 138 MMHG | HEART RATE: 94 BPM | BODY MASS INDEX: 29.49 KG/M2 | OXYGEN SATURATION: 95 %

## 2025-08-05 DIAGNOSIS — E55.9 VITAMIN D DEFICIENCY: ICD-10-CM

## 2025-08-05 DIAGNOSIS — Z12.11 ENCOUNTER FOR COLORECTAL CANCER SCREENING: ICD-10-CM

## 2025-08-05 DIAGNOSIS — Z01.89 ENCOUNTER FOR ROUTINE LABORATORY TESTING: ICD-10-CM

## 2025-08-05 DIAGNOSIS — Z12.5 ENCOUNTER FOR PROSTATE CANCER SCREENING: ICD-10-CM

## 2025-08-05 DIAGNOSIS — M47.818 ARTHROPATHY OF RIGHT SACROILIAC JOINT: ICD-10-CM

## 2025-08-05 DIAGNOSIS — G63 POLYNEUROPATHY ASSOCIATED WITH UNDERLYING DISEASE: ICD-10-CM

## 2025-08-05 DIAGNOSIS — R53.82 CHRONIC FATIGUE: ICD-10-CM

## 2025-08-05 DIAGNOSIS — Z12.12 ENCOUNTER FOR COLORECTAL CANCER SCREENING: ICD-10-CM

## 2025-08-05 DIAGNOSIS — D64.9 ANEMIA, UNSPECIFIED TYPE: ICD-10-CM

## 2025-08-05 DIAGNOSIS — K21.9 GASTROESOPHAGEAL REFLUX DISEASE WITHOUT ESOPHAGITIS: ICD-10-CM

## 2025-08-05 DIAGNOSIS — Z98.1 HISTORY OF FUSION OF THORACIC SPINE: ICD-10-CM

## 2025-08-05 DIAGNOSIS — Z23 ENCOUNTER FOR IMMUNIZATION: ICD-10-CM

## 2025-08-05 DIAGNOSIS — Z98.1 HISTORY OF FUSION OF CERVICAL SPINE: ICD-10-CM

## 2025-08-05 DIAGNOSIS — R73.01 IFG (IMPAIRED FASTING GLUCOSE): ICD-10-CM

## 2025-08-05 DIAGNOSIS — Z71.89 COUNSELING REGARDING ADVANCED CARE PLANNING AND GOALS OF CARE: ICD-10-CM

## 2025-08-05 DIAGNOSIS — I10 PRIMARY HYPERTENSION: ICD-10-CM

## 2025-08-05 DIAGNOSIS — M15.0 PRIMARY OSTEOARTHRITIS INVOLVING MULTIPLE JOINTS: ICD-10-CM

## 2025-08-05 DIAGNOSIS — Z98.1 HISTORY OF FUSION OF LUMBAR SPINE: ICD-10-CM

## 2025-08-05 DIAGNOSIS — L98.9 SKIN LESION: ICD-10-CM

## 2025-08-05 DIAGNOSIS — E78.2 MIXED HYPERLIPIDEMIA: ICD-10-CM

## 2025-08-05 DIAGNOSIS — Z00.00 ANNUAL PHYSICAL EXAM: Primary | ICD-10-CM

## 2025-08-05 PROCEDURE — 99215 OFFICE O/P EST HI 40 MIN: CPT | Mod: 25 | Performed by: STUDENT IN AN ORGANIZED HEALTH CARE EDUCATION/TRAINING PROGRAM

## 2025-08-05 PROCEDURE — 99214 OFFICE O/P EST MOD 30 MIN: CPT | Performed by: STUDENT IN AN ORGANIZED HEALTH CARE EDUCATION/TRAINING PROGRAM

## 2025-08-05 RX ORDER — DICLOFENAC SODIUM 75 MG/1
75 TABLET, DELAYED RELEASE ORAL 2 TIMES DAILY
Qty: 180 TABLET | Refills: 3 | Status: SHIPPED | OUTPATIENT
Start: 2025-08-05 | End: 2026-08-05

## 2025-08-05 ASSESSMENT — ACTIVITIES OF DAILY LIVING (ADL)
MANAGING_FINANCES: INDEPENDENT
GROCERY_SHOPPING: INDEPENDENT
DOING_HOUSEWORK: INDEPENDENT
TAKING_MEDICATION: INDEPENDENT
BATHING: INDEPENDENT
DRESSING: INDEPENDENT

## 2025-08-05 ASSESSMENT — PATIENT HEALTH QUESTIONNAIRE - PHQ9
SUM OF ALL RESPONSES TO PHQ9 QUESTIONS 1 AND 2: 0
1. LITTLE INTEREST OR PLEASURE IN DOING THINGS: NOT AT ALL
SUM OF ALL RESPONSES TO PHQ9 QUESTIONS 1 AND 2: 0
2. FEELING DOWN, DEPRESSED OR HOPELESS: NOT AT ALL
2. FEELING DOWN, DEPRESSED OR HOPELESS: NOT AT ALL
1. LITTLE INTEREST OR PLEASURE IN DOING THINGS: NOT AT ALL

## 2025-08-05 ASSESSMENT — ENCOUNTER SYMPTOMS
NEUROLOGICAL NEGATIVE: 1
EYES NEGATIVE: 1
RESPIRATORY NEGATIVE: 1
CONSTITUTIONAL NEGATIVE: 1
ALLERGIC/IMMUNOLOGIC NEGATIVE: 1
ENDOCRINE NEGATIVE: 1
HEMATOLOGIC/LYMPHATIC NEGATIVE: 1
MUSCULOSKELETAL NEGATIVE: 1
GASTROINTESTINAL NEGATIVE: 1
CARDIOVASCULAR NEGATIVE: 1
PSYCHIATRIC NEGATIVE: 1

## 2025-08-05 ASSESSMENT — PAIN SCALES - GENERAL: PAINLEVEL_OUTOF10: 7

## 2025-08-05 NOTE — PROGRESS NOTES
[facility] [place]: [facility]  MEDICARE WELLNESS EXAM    Contreras De La Cruz is a 70 y.o. male that is presenting today for Annual Exam.    Assessment/Plan    - Patient noting finger pain. Check XR and refer to ortho-hand.  - Patient notes that he is working with pain management on his SI joints because they are still causing him significant discomfort.  - Patient noting a 2-month history of tingling involving his proximal and distal L-upper extremity. Occurs with both rest and exertion but is definitely worse with exertion.  - Otherwise, the patient feels well and denies any acute symptoms or concerns at this time.  - Blood pressure at goal today.  - Encouraged continued dietary, exercise, and lifestyle modification.  - Significant medication and problem list reconciliation done today.   - Patient noting that he is requesting that I increase what I believe to be the meloxicam past 15mg/day. Discussed with the patient that 15mg/day would be the maximal dosage; regardless, it looks like the patient was transitioned off of this medication and onto diclofenac (voltaren) by pain management. I do not see meloxicam on his current medication list and have encouraged him to double-check what he is taking because he cannot take both meloxicam and diclofenac due to both increased risk for GI bleeding as well as increased risk of kidney injury.  - Will refill the diclofenac at this time per patient request.    Discussed routine and/or preventative care with the patient as outlined below:  - Labwork:   - Patient had labwork done for this appointment. Discussed today.   - Will order labwork for the patient's next appointment. Encouraged the patient to get this labwork done one week prior to the next appointment.  - Screening Studies:   - Colorectal Cancer: Not due until 2029.  - Patient does not appear to be due for routine imaging at this time.  - Immunizations:   - Discussed seasonal immunizations, including the  influenza and COVID-19 immunizations.     ADVANCED CARE PLANNING  Advanced Care Planning was discussed with patient.  Encouraged the patient to confirm that Living Will and Healthcare Power of  (HCPoA) are accurate and up to date.  Encouraged the patient to confirm that our office be provided a copy of any documentation in the event that anything changes.    Diagnoses and all orders for this visit:  Annual physical exam  -     Follow Up In Primary Care  Counseling regarding advanced care planning and goals of care  Encounter for colorectal cancer screening  Encounter for routine laboratory testing  Encounter for prostate cancer screening  Encounter for immunization  Gastroesophageal reflux disease without esophagitis  Polyneuropathy associated with underlying disease  Primary osteoarthritis involving multiple joints  History of fusion of thoracic spine  History of fusion of cervical spine  History of fusion of lumbar spine  IFG (impaired fasting glucose)  Anemia, unspecified type  Mixed hyperlipidemia  Primary hypertension  Vitamin D deficiency  Chronic fatigue  Skin lesion  -     Referral to Dermatology    Current Outpatient Medications   Medication Instructions    acetaminophen (TYLENOL EXTRA STRENGTH) 500-1,000 mg, oral, 3 times daily PRN    atorvastatin (LIPITOR) 40 mg, oral, Daily    cholecalciferol (VITAMIN D3) 50 mcg, oral, Daily    diclofenac (VOLTAREN) 75 mg, oral, 2 times daily, Do not crush, chew, or split.    DULoxetine (CYMBALTA) 60 mg, oral, Daily    esomeprazole (NEXIUM) 20 mg, oral, Daily    magnesium hydroxide (MAGNESIA ORAL) 500 mg    multivitamin tablet 1 tablet, oral, Daily     Subjective   - The patient otherwise feels well and denies any acute symptoms or concerns at this time.  - The patient denies any changes or progression of their chronic medical problems.  - The patient denies any problems or concerns with their medications.      Review of Systems   Constitutional: Negative.     HENT: Negative.     Eyes: Negative.    Respiratory: Negative.     Cardiovascular: Negative.    Gastrointestinal: Negative.    Endocrine: Negative.    Genitourinary: Negative.    Musculoskeletal: Negative.    Skin: Negative.    Allergic/Immunologic: Negative.    Neurological: Negative.    Hematological: Negative.    Psychiatric/Behavioral: Negative.     All other systems reviewed and are negative.    Objective   Vitals:    08/05/25 1409   BP: 138/80   Pulse: 94   SpO2: 95%      Body mass index is 29.56 kg/m².  Physical Exam  Vitals and nursing note reviewed.   Constitutional:       General: He is not in acute distress.     Appearance: Normal appearance. He is not ill-appearing.   HENT:      Head: Normocephalic and atraumatic.      Right Ear: Tympanic membrane, ear canal and external ear normal. There is no impacted cerumen.      Left Ear: Tympanic membrane, ear canal and external ear normal. There is no impacted cerumen.      Nose: Nose normal.      Mouth/Throat:      Mouth: Mucous membranes are moist.      Pharynx: Oropharynx is clear. No oropharyngeal exudate or posterior oropharyngeal erythema.     Eyes:      General: No scleral icterus.        Right eye: No discharge.         Left eye: No discharge.      Extraocular Movements: Extraocular movements intact.      Conjunctiva/sclera: Conjunctivae normal.      Pupils: Pupils are equal, round, and reactive to light.     Neck:      Vascular: No carotid bruit.     Cardiovascular:      Rate and Rhythm: Normal rate and regular rhythm.      Pulses: Normal pulses.      Heart sounds: Normal heart sounds. No murmur heard.     No friction rub. No gallop.   Pulmonary:      Effort: Pulmonary effort is normal. No respiratory distress.      Breath sounds: Normal breath sounds.   Abdominal:      General: Abdomen is flat. Bowel sounds are normal.      Palpations: Abdomen is soft.      Tenderness: There is no abdominal tenderness.      Hernia: No hernia is present.  "    Musculoskeletal:         General: No swelling. Normal range of motion.      Cervical back: Normal range of motion.   Lymphadenopathy:      Cervical: No cervical adenopathy.     Skin:     General: Skin is warm and dry.      Coloration: Skin is not jaundiced.      Findings: No rash.     Neurological:      General: No focal deficit present.      Mental Status: He is alert and oriented to person, place, and time. Mental status is at baseline.     Psychiatric:         Mood and Affect: Mood normal.         Behavior: Behavior normal.       Diagnostic Results   Lab Results   Component Value Date    GLUCOSE 103 (H) 07/30/2025    CALCIUM 9.8 07/30/2025     07/30/2025    K 4.5 07/30/2025    CO2 26 07/30/2025     07/30/2025    BUN 19 07/30/2025    CREATININE 0.91 07/30/2025     Lab Results   Component Value Date    ALT 27 07/30/2025    AST 20 07/30/2025    ALKPHOS 51 07/30/2025    BILITOT 1.1 07/30/2025     Lab Results   Component Value Date    WBC 7.6 07/30/2025    HGB 12.9 (L) 07/30/2025    HCT 43.3 07/30/2025    MCV 63.1 (L) 07/30/2025     07/30/2025     Lab Results   Component Value Date    CHOL 171 07/30/2025    CHOL 163 07/26/2024    CHOL 169 06/20/2023     Lab Results   Component Value Date    HDL 35 (L) 07/30/2025    HDL 39.0 (L) 07/26/2024    HDL 42 06/20/2023     Lab Results   Component Value Date    LDLCALC 102 (H) 07/30/2025    LDLCALC 97 07/26/2024    LDLCALC 104 06/20/2023     Lab Results   Component Value Date    TRIG 222 (H) 07/30/2025    TRIG 133 07/26/2024    TRIG 116 06/20/2023     No components found for: \"CHOLHDL\"  Lab Results   Component Value Date    HGBA1C 5.5 07/30/2025     Other labs not included in the list above reviewed either before or during this encounter.    History   Medical History[1]  Surgical History[2]  Family History[3]  Social History     Socioeconomic History    Marital status:      Spouse name: Not on file    Number of children: Not on file    Years of " education: Not on file    Highest education level: Not on file   Occupational History    Not on file   Tobacco Use    Smoking status: Former     Current packs/day: 0.00     Average packs/day: 1.3 packs/day for 20.0 years (25.0 ttl pk-yrs)     Types: Cigarettes     Start date:      Quit date:      Years since quittin.6    Smokeless tobacco: Current     Types: Chew   Vaping Use    Vaping status: Never Used   Substance and Sexual Activity    Alcohol use: Not Currently    Drug use: Yes     Frequency: 7.0 times per week     Types: Marijuana     Comment: THC 10 mg daily    Sexual activity: Not on file   Other Topics Concern    Not on file   Social History Narrative    Not on file     Social Drivers of Health     Financial Resource Strain: Not on file   Food Insecurity: Not on file   Transportation Needs: Not on file   Physical Activity: Not on file   Stress: Not on file   Social Connections: Not on file   Intimate Partner Violence: Not on file   Housing Stability: Not on file     Allergies[4]  Medications Ordered Prior to Encounter[5]  Immunization History   Administered Date(s) Administered    COVID-19, mRNA, LNP-S, PF, 30 mcg/0.3 mL dose 2021, 2021    Flu vaccine, quadrivalent, high-dose, preservative free, age 65y+ (FLUZONE) 2021, 2022, 2024    Flu vaccine, trivalent, preservative free, HIGH-DOSE, age 65y+ (Fluzone) 2021, 2022, 2024    Influenza, Unspecified 2022    Influenza, injectable, quadrivalent 10/22/2018, 09/15/2020    Pfizer COVID-19 vaccine, 12 years and older, (30mcg/0.3mL) (Comirnaty) 2024, 2024    Pfizer COVID-19 vaccine, bivalent, age 12 years and older (30 mcg/0.3 mL) 2022, 2023    Pfizer Gray Cap SARS-CoV-2 2022    Pfizer Purple Cap SARS-CoV-2 2021, 2021, 2021    Pneumococcal conjugate vaccine, 20-valent (PREVNAR 20) 2023    RSV, 60 Years And Older (AREXVY) 2024    Tdap vaccine,  age 7 year and older (BOOSTRIX, ADACEL) 11/11/2015, 05/16/2016    Zoster vaccine, recombinant, adult (SHINGRIX) 06/29/2023, 08/27/2023    Zoster, live 05/16/2016     Patient's medical history was reviewed and updated either before or during this encounter.     Bryan Esparza MD       [1]   Past Medical History:  Diagnosis Date    Anemia         Arthritis     Cervical disc disease     Chronic fatigue     Chronic pain     Class 1 obesity with body mass index (BMI) of 30.0 to 30.9 in adult 07/30/2024    GERD (gastroesophageal reflux disease)     HLD (hyperlipidemia)     Hypertension     Impaired fasting glucose     Lumbar disc disease     Polyneuropathy     Polyp of colon     Primary osteoarthritis involving multiple joints     Spinal stenosis     Thoracic disc disease    [2]   Past Surgical History:  Procedure Laterality Date    APPENDECTOMY  04/21/2016    ARTHRODESIS WRIST W/ OR W/O BONE GRAFT Right 10/31/2014    BACK SURGERY  1988    BACK SURGERY  1998    INSERTION / REMOVAL EPIDURAL SPINAL NEUROSTIMULATOR  09/16/2010    LUMBAR INSERTION    LEG SURGERY Right 1980    plate put in broke leg and shin due to an accident    NECK SURGERY  2003    REVISION / REMOVAL NEUROSTIMULATOR  02/24/2020    LUMBAR GENERATOR REPLACEMENT    VARICOSE VEIN SURGERY      WRIST SURGERY     [3]   Family History  Problem Relation Name Age of Onset    Heart failure Mother      Cancer Father     [4]   Allergies  Allergen Reactions    Iodinated Contrast Media Other    Gabapentin Hives    Iodine Nausea/vomiting     iodine    Niacin-Lovastatin Hives   [5]   Current Outpatient Medications on File Prior to Visit   Medication Sig Dispense Refill    acetaminophen (Tylenol Extra Strength) 500 mg tablet Take 1-2 tablets (500-1,000 mg) by mouth 3 times a day as needed for mild pain (1 - 3), moderate pain (4 - 6) or fever (temp greater than 38.0 C).      atorvastatin (Lipitor) 40 mg tablet Take 1 tablet (40 mg) by mouth once daily. 90 tablet 3     cholecalciferol (Vitamin D3) 50 mcg (2,000 unit) capsule Take 1 capsule (50 mcg) by mouth once daily.      diclofenac (Voltaren) 75 mg EC tablet Take 1 tablet (75 mg) by mouth 2 times a day. Do not crush, chew, or split. 60 tablet 0    DULoxetine (Cymbalta) 60 mg DR capsule TAKE 1 CAPSULE BY MOUTH EVERY DAY 90 capsule 3    esomeprazole (NexIUM) 20 mg DR capsule Take 1 capsule (20 mg) by mouth once daily.      magnesium hydroxide (MAGNESIA ORAL) Take 500 mg by mouth.      multivitamin tablet Take 1 tablet by mouth once daily.       No current facility-administered medications on file prior to visit.

## 2025-08-05 NOTE — PATIENT INSTRUCTIONS
- Patient noting finger pain. Check XR and refer to ortho-hand.  - Patient notes that he is working with pain management on his SI joints because they are still causing him significant discomfort.  - Patient noting a 2-month history of tingling involving his proximal and distal L-upper extremity. Occurs with both rest and exertion but is definitely worse with exertion.  - Otherwise, the patient feels well and denies any acute symptoms or concerns at this time.  - Blood pressure at goal today.  - Encouraged continued dietary, exercise, and lifestyle modification.  - Significant medication and problem list reconciliation done today.   - Patient noting that he is requesting that I increase what I believe to be the meloxicam past 15mg/day. Discussed with the patient that 15mg/day would be the maximal dosage; regardless, it looks like the patient was transitioned off of this medication and onto diclofenac (voltaren) by pain management. I do not see meloxicam on his current medication list and have encouraged him to double-check what he is taking because he cannot take both meloxicam and diclofenac due to both increased risk for GI bleeding as well as increased risk of kidney injury.  - Will refill the diclofenac at this time per patient request.    Discussed routine and/or preventative care with the patient as outlined below:  - Labwork:   - Patient had labwork done for this appointment. Discussed today.   - Will order labwork for the patient's next appointment. Encouraged the patient to get this labwork done one week prior to the next appointment.  - Screening Studies:   - Colorectal Cancer: Not due until 2029.  - Patient does not appear to be due for routine imaging at this time.  - Immunizations:   - Discussed seasonal immunizations, including the influenza and COVID-19 immunizations.     ADVANCED CARE PLANNING  Advanced Care Planning was discussed with patient.  Encouraged the patient to confirm that Living Will and  Healthcare Power of  (HCPoA) are accurate and up to date.  Encouraged the patient to confirm that our office be provided a copy of any documentation in the event that anything changes.

## 2025-08-11 ENCOUNTER — HOSPITAL ENCOUNTER (OUTPATIENT)
Dept: RADIOLOGY | Facility: CLINIC | Age: 70
Discharge: HOME | End: 2025-08-11
Payer: MEDICARE

## 2025-08-11 DIAGNOSIS — M15.0 PRIMARY OSTEOARTHRITIS INVOLVING MULTIPLE JOINTS: ICD-10-CM

## 2025-08-11 PROCEDURE — 73130 X-RAY EXAM OF HAND: CPT | Mod: RIGHT SIDE | Performed by: RADIOLOGY

## 2025-08-11 PROCEDURE — 73130 X-RAY EXAM OF HAND: CPT | Mod: RT

## 2025-08-28 ENCOUNTER — OFFICE VISIT (OUTPATIENT)
Dept: PAIN MEDICINE | Facility: CLINIC | Age: 70
End: 2025-08-28
Payer: MEDICARE

## 2025-08-28 VITALS
DIASTOLIC BLOOD PRESSURE: 74 MMHG | OXYGEN SATURATION: 97 % | SYSTOLIC BLOOD PRESSURE: 134 MMHG | BODY MASS INDEX: 29.49 KG/M2 | RESPIRATION RATE: 18 BRPM | HEIGHT: 70 IN | WEIGHT: 206 LBS | HEART RATE: 73 BPM

## 2025-08-28 DIAGNOSIS — M47.818 ARTHROPATHY OF RIGHT SACROILIAC JOINT: Primary | ICD-10-CM

## 2025-08-28 PROCEDURE — 3075F SYST BP GE 130 - 139MM HG: CPT | Performed by: STUDENT IN AN ORGANIZED HEALTH CARE EDUCATION/TRAINING PROGRAM

## 2025-08-28 PROCEDURE — 1160F RVW MEDS BY RX/DR IN RCRD: CPT | Performed by: STUDENT IN AN ORGANIZED HEALTH CARE EDUCATION/TRAINING PROGRAM

## 2025-08-28 PROCEDURE — 1125F AMNT PAIN NOTED PAIN PRSNT: CPT | Performed by: STUDENT IN AN ORGANIZED HEALTH CARE EDUCATION/TRAINING PROGRAM

## 2025-08-28 PROCEDURE — 1159F MED LIST DOCD IN RCRD: CPT | Performed by: STUDENT IN AN ORGANIZED HEALTH CARE EDUCATION/TRAINING PROGRAM

## 2025-08-28 PROCEDURE — 3008F BODY MASS INDEX DOCD: CPT | Performed by: STUDENT IN AN ORGANIZED HEALTH CARE EDUCATION/TRAINING PROGRAM

## 2025-08-28 PROCEDURE — G2211 COMPLEX E/M VISIT ADD ON: HCPCS | Performed by: STUDENT IN AN ORGANIZED HEALTH CARE EDUCATION/TRAINING PROGRAM

## 2025-08-28 PROCEDURE — 4004F PT TOBACCO SCREEN RCVD TLK: CPT | Performed by: STUDENT IN AN ORGANIZED HEALTH CARE EDUCATION/TRAINING PROGRAM

## 2025-08-28 PROCEDURE — 99213 OFFICE O/P EST LOW 20 MIN: CPT

## 2025-08-28 PROCEDURE — 3078F DIAST BP <80 MM HG: CPT | Performed by: STUDENT IN AN ORGANIZED HEALTH CARE EDUCATION/TRAINING PROGRAM

## 2025-08-28 PROCEDURE — 99214 OFFICE O/P EST MOD 30 MIN: CPT | Performed by: STUDENT IN AN ORGANIZED HEALTH CARE EDUCATION/TRAINING PROGRAM

## 2025-08-28 ASSESSMENT — PAIN SCALES - GENERAL
PAINLEVEL_OUTOF10: 2
PAINLEVEL_OUTOF10: 2

## 2025-08-28 ASSESSMENT — PAIN - FUNCTIONAL ASSESSMENT: PAIN_FUNCTIONAL_ASSESSMENT: 0-10

## 2025-08-28 ASSESSMENT — PAIN DESCRIPTION - DESCRIPTORS: DESCRIPTORS: ACHING

## (undated) DEVICE — DRAPE, C-ARMOR KIT

## (undated) DEVICE — SPONGE, GAUZE, RADIOPAQUE, 12 PLY, 4 X 8 IN, STERILE, LF

## (undated) DEVICE — SYRINGE, 20 CC, LUER LOCK

## (undated) DEVICE — SOLUTION, IRRIGATION, X RX SODIUM CHL 0.9%, 1000ML BTL

## (undated) DEVICE — DRAPE, C-ARM IMAGE

## (undated) DEVICE — DRAPE PACK, BASIC VI, AURORA, 50 X 90IN

## (undated) DEVICE — INTRACEPT, ADDITIONAL ACCESS INSTRUMENT

## (undated) DEVICE — NEEDLE, SPINAL, 22 G X 3.5 IN, BLACK HUB

## (undated) DEVICE — INTRACEPT, RF PROBE

## (undated) DEVICE — NEEDLE, HYPODERMIC, MONOJECT, 22 G X 1.5 IN, BLUE, RIGID PACK

## (undated) DEVICE — Device

## (undated) DEVICE — DRAPE, SHEET, LAPAROTOMY

## (undated) DEVICE — GOWN, SURGICAL, SIRUS, NON REINFORCED, LARGE

## (undated) DEVICE — GLOVE, SURGICAL, PROTEXIS PI , 8.0, PF, LF

## (undated) DEVICE — APPLICATOR, CHLORAPREP, W/ORANGE TINT, 26ML

## (undated) DEVICE — INTRACEPT, ACCESS INSTRUMENT

## (undated) DEVICE — DRAPE, SHEET, LAPAROTOMY, W/ISO-BAC, W/ARMBOARD COVERS, 98 X 122 IN, DISPOSABLE, LF, STERILE

## (undated) DEVICE — DRAPE, SHEET, LARGE, 70 X 85IN, STERILE

## (undated) DEVICE — COVERS, DISPOSABLE, SMALL, FOR LIGHT PAD

## (undated) DEVICE — TISSUE ADHESIVE, EXOFIN, 1ML

## (undated) DEVICE — GLOVE, SURGICAL, PROTEXIS PI BLUE W/NEUTHERA, 8.0, PF, LF

## (undated) DEVICE — BASIN SET, SINGLE